# Patient Record
Sex: FEMALE | Race: BLACK OR AFRICAN AMERICAN | NOT HISPANIC OR LATINO | Employment: OTHER | URBAN - METROPOLITAN AREA
[De-identification: names, ages, dates, MRNs, and addresses within clinical notes are randomized per-mention and may not be internally consistent; named-entity substitution may affect disease eponyms.]

---

## 2017-01-04 ENCOUNTER — HOSPITAL ENCOUNTER (OUTPATIENT)
Dept: SLEEP MEDICINE | Facility: HOSPITAL | Age: 82
Discharge: HOME | End: 2017-01-04
Admitting: INTERNAL MEDICINE

## 2017-01-04 PROCEDURE — G0463 HOSPITAL OUTPT CLINIC VISIT: HCPCS

## 2017-01-04 PROCEDURE — 99213 OFFICE O/P EST LOW 20 MIN: CPT | Performed by: INTERNAL MEDICINE

## 2017-01-07 PROBLEM — G47.33 OSA TREATED WITH BIPAP: Status: ACTIVE | Noted: 2017-01-07

## 2017-01-07 NOTE — PROGRESS NOTES
[facility] Note January 4, 2017      Patient Care Team:  Мария Wilks MD as PCP - General  Мария Wilks MD as PCP - Family Medicine    Chief Complaint:  PRABHU     Interval History:   The patient was last seen by me in August 2016.  She was having increasing complaints of dyspnea at that time.  She is having more complaints with her arthritis.  She is stiffening and having difficulty ambulating.  She has back and leg pain.  She also has macular degeneration.    She appears to be stable, unchanged from her PRABHU standpoint.  She goes to bed between midnight and 2 AM and awakens 5 or 6 hours later.  She does have some complaints of hypersomnolence and her [place] Sleepiness Scale is borderline abnormal at 8.    Review of Systems:  Recorded on the Sleep Questionnaire.  Unremarkable except for nasal congestion, shortness of breath with exertion, occasional wheezing, atypical chest pain, cough, and anxiety and depression.    Social History:  She does not smoke cigarettes.  No alcohol.    Allergies:  Reviewed.     Medication Review:  Reviewed.      Vital Signs:  Height 60 inches and weight is up 20 pounds and she presently weighs 220 and she is obese with a body mass index of 43.    Physical Exam:    Constitutional:  Well developed black female and appears in no apparent distress.  Awake & oriented times 3.  Normal mood with normal recent and remote memory and normal judgement.  Eyes:  Conjunctivae normal.  Oropharynx:  moist mucous membranes without exudate and a large tongue and normal uvula and class II-III MP airway.      Results Review:  DME is [facility] and she uses a nasal mask.  Downloads between December 5, 2016 and January 3, 2017 reveals compliance at 100% and average usage is 4 hours and 59 minutes and usage greater than equal to 4 hours at 77% and average AHI is normal without significant leak.  Average auto BiPAP pressures: IPAP of 14 and EPAP of 10.1.  Auto BiPAP settings:  Max IPAP 18 minimum EPAP 10 max PS 6 minimum PS-2.       Impression:   Obstructive sleep apnea adequately treated with auto titrating BiPAP with good compliance and usage of borderline complaints of hypersomnolence.      Plan:  Good sleep hygiene measures should be maintained and weight loss would be beneficial.  The patient will continue her auto BiPAP nightly and she will use it greater than 4 hours nightly.  She feels better when she does use it this way.  I will see the patient back in August.      Carlos Eduardo Park MD  01/07/17  12:41 PM

## 2017-03-22 ENCOUNTER — HOSPITAL ENCOUNTER (OUTPATIENT)
Dept: SLEEP MEDICINE | Facility: HOSPITAL | Age: 82
Discharge: HOME | End: 2017-03-22
Admitting: INTERNAL MEDICINE

## 2017-03-22 PROCEDURE — 99213 OFFICE O/P EST LOW 20 MIN: CPT | Performed by: INTERNAL MEDICINE

## 2017-03-22 PROCEDURE — G0463 HOSPITAL OUTPT CLINIC VISIT: HCPCS

## 2017-03-26 NOTE — PROGRESS NOTES
[facility] Note       Patient Care Team:  Маиря Wilks MD as PCP - Internal Medicine    Chief Complaint:  PRABHU     Interval History:   The patient was last seen by me in January of this year.  Encouragement to use her auto BiPAP more was given.  She goes to bed around midnight and sleeps for 5 or 6 hours.  She does have complaints of hypersomnolence with an abnormal [place] Sleepiness Scale of 12.    Review of Systems:  Recorded on the Sleep Questionnaire.  Unremarkable except for wheezing, especially with exertion and anxiety and depression.    Social History:  She does not smoke cigarettes.  No alcohol.  She drinks some caffeinated beverages.    Allergies:  Reviewed.     Medication Review:  Reviewed.      Vital Signs:  Height 60 inches and weight 214 and she is obese with a body mass index of 42.    Physical Exam:    Constitutional:  Well developed black female and appears in no apparent distress.  She uses a rolling walker.  Awake & oriented times 3.  Normal mood with normal recent and remote memory and normal judgement.  Eyes:  Conjunctivae normal.  Oropharynx:  moist mucous membranes without exudate and a large tongue and normal uvula and class II-III MP airway.      Results Review:  DME is [facility].  She uses nasal pillows.  Downloads between 1/4/2017 and 3/20/2017 compliances greater than 95%.  Average usage is 4 hours and 30 minutes.  Average AHI is normal with a borderline leak.  Average auto BiPAP pressures: IPAP of 13.8 and EPAP of 10.1.  Auto BiPAP settings max IPAP 18 and minimum EPAP 10 and max pressure support 6 and minimal pressure support 2.       Impression:   Obstructive sleep apnea adequately treated with auto titrating BiPAP with improved compliance and usage suggested.  She has persistent complaints of hypersomnolence.      Plan:  Good sleep hygiene measures should be maintained.  Weight loss would be beneficial in this patient who is obese by BMI.    The  patient will continue auto BiPAP as she is doing.  She should use it every night as she is doing and she should try using it greater than 4 hours every day.    The patient will follow up with me in August.      Carlos Eduardo Park MD  03/26/17  12:00 PM

## 2017-04-21 ENCOUNTER — TRANSCRIBE ORDERS (OUTPATIENT)
Dept: PHYSICAL THERAPY | Facility: HOSPITAL | Age: 82
End: 2017-04-21

## 2017-04-21 DIAGNOSIS — R60.0 PEDAL EDEMA: Primary | ICD-10-CM

## 2017-05-11 ENCOUNTER — APPOINTMENT (OUTPATIENT)
Dept: PHYSICAL THERAPY | Facility: HOSPITAL | Age: 82
End: 2017-05-11

## 2017-12-15 ENCOUNTER — OFFICE VISIT (OUTPATIENT)
Dept: WOUND CARE | Facility: HOSPITAL | Age: 82
End: 2017-12-15
Attending: SURGERY

## 2017-12-15 DIAGNOSIS — IMO0001 INFECTION INVOLVING STITCH WITH ABSCESS, INITIAL ENCOUNTER: Primary | ICD-10-CM

## 2017-12-15 PROCEDURE — G0463 HOSPITAL OUTPT CLINIC VISIT: HCPCS

## 2017-12-15 PROCEDURE — 87070 CULTURE OTHR SPECIMN AEROBIC: CPT | Performed by: SURGERY

## 2017-12-15 PROCEDURE — 87075 CULTR BACTERIA EXCEPT BLOOD: CPT | Performed by: SURGERY

## 2017-12-15 PROCEDURE — 87186 SC STD MICRODIL/AGAR DIL: CPT | Performed by: SURGERY

## 2017-12-15 PROCEDURE — 87205 SMEAR GRAM STAIN: CPT | Performed by: SURGERY

## 2017-12-20 LAB — BACTERIA SPEC ANAEROBE CULT: NORMAL

## 2017-12-21 LAB
BACTERIA SPEC AEROBE CULT: ABNORMAL
GRAM STN SPEC: ABNORMAL

## 2017-12-22 ENCOUNTER — APPOINTMENT (OUTPATIENT)
Dept: WOUND CARE | Facility: HOSPITAL | Age: 82
End: 2017-12-22
Attending: SURGERY

## 2017-12-22 PROCEDURE — 97602 WOUND(S) CARE NON-SELECTIVE: CPT

## 2017-12-29 ENCOUNTER — APPOINTMENT (OUTPATIENT)
Dept: WOUND CARE | Facility: HOSPITAL | Age: 82
End: 2017-12-29
Attending: SURGERY

## 2018-01-05 ENCOUNTER — APPOINTMENT (OUTPATIENT)
Dept: WOUND CARE | Facility: HOSPITAL | Age: 83
End: 2018-01-05
Attending: SURGERY

## 2018-01-12 ENCOUNTER — APPOINTMENT (OUTPATIENT)
Dept: WOUND CARE | Facility: HOSPITAL | Age: 83
End: 2018-01-12
Attending: SURGERY

## 2018-07-06 ENCOUNTER — APPOINTMENT (OUTPATIENT)
Dept: CT IMAGING | Facility: HOSPITAL | Age: 83
End: 2018-07-06

## 2018-07-06 ENCOUNTER — HOSPITAL ENCOUNTER (EMERGENCY)
Facility: HOSPITAL | Age: 83
Discharge: HOME | End: 2018-07-06
Attending: EMERGENCY MEDICINE | Admitting: EMERGENCY MEDICINE

## 2018-07-06 VITALS
TEMPERATURE: 96.8 F | SYSTOLIC BLOOD PRESSURE: 139 MMHG | HEIGHT: 62 IN | WEIGHT: 170 LBS | DIASTOLIC BLOOD PRESSURE: 60 MMHG | BODY MASS INDEX: 31.28 KG/M2 | HEART RATE: 61 BPM | RESPIRATION RATE: 16 BRPM | OXYGEN SATURATION: 96 %

## 2018-07-06 DIAGNOSIS — H81.11 VERTIGO, BENIGN PAROXYSMAL, RIGHT: Primary | ICD-10-CM

## 2018-07-06 LAB
ALBUMIN SERPL-MCNC: 4 G/DL (ref 3.5–5.2)
ALBUMIN/GLOB SERPL: 1.1 G/DL
ALP SERPL-CCNC: 55 U/L (ref 39–117)
ALT SERPL W P-5'-P-CCNC: 21 U/L (ref 1–33)
ANION GAP SERPL CALCULATED.3IONS-SCNC: 12 MMOL/L
AST SERPL-CCNC: 19 U/L (ref 1–32)
BASOPHILS # BLD AUTO: 0.01 10*3/MM3 (ref 0–0.2)
BASOPHILS NFR BLD AUTO: 0.1 % (ref 0–1.5)
BILIRUB SERPL-MCNC: 0.3 MG/DL (ref 0.1–1.2)
BILIRUB UR QL STRIP: NEGATIVE
BUN BLD-MCNC: 36 MG/DL (ref 8–23)
BUN/CREAT SERPL: 28.8 (ref 7–25)
CALCIUM SPEC-SCNC: 10.1 MG/DL (ref 8.6–10.5)
CHLORIDE SERPL-SCNC: 98 MMOL/L (ref 98–107)
CLARITY UR: CLEAR
CO2 SERPL-SCNC: 30 MMOL/L (ref 22–29)
COLOR UR: YELLOW
CREAT BLD-MCNC: 1.25 MG/DL (ref 0.57–1)
DEPRECATED RDW RBC AUTO: 46.6 FL (ref 37–54)
EOSINOPHIL # BLD AUTO: 0.06 10*3/MM3 (ref 0–0.7)
EOSINOPHIL NFR BLD AUTO: 0.7 % (ref 0.3–6.2)
ERYTHROCYTE [DISTWIDTH] IN BLOOD BY AUTOMATED COUNT: 13.7 % (ref 11.7–13)
GFR SERPL CREATININE-BSD FRML MDRD: 49 ML/MIN/1.73
GLOBULIN UR ELPH-MCNC: 3.7 GM/DL
GLUCOSE BLD-MCNC: 193 MG/DL (ref 65–99)
GLUCOSE UR STRIP-MCNC: NEGATIVE MG/DL
HCT VFR BLD AUTO: 38.6 % (ref 35.6–45.5)
HGB BLD-MCNC: 12.8 G/DL (ref 11.9–15.5)
HGB UR QL STRIP.AUTO: NEGATIVE
IMM GRANULOCYTES # BLD: 0.02 10*3/MM3 (ref 0–0.03)
IMM GRANULOCYTES NFR BLD: 0.2 % (ref 0–0.5)
INR PPP: 1.07 (ref 0.9–1.1)
KETONES UR QL STRIP: NEGATIVE
LEUKOCYTE ESTERASE UR QL STRIP.AUTO: NEGATIVE
LYMPHOCYTES # BLD AUTO: 0.62 10*3/MM3 (ref 0.9–4.8)
LYMPHOCYTES NFR BLD AUTO: 6.8 % (ref 19.6–45.3)
MCH RBC QN AUTO: 30.8 PG (ref 26.9–32)
MCHC RBC AUTO-ENTMCNC: 33.2 G/DL (ref 32.4–36.3)
MCV RBC AUTO: 92.8 FL (ref 80.5–98.2)
MONOCYTES # BLD AUTO: 0.46 10*3/MM3 (ref 0.2–1.2)
MONOCYTES NFR BLD AUTO: 5 % (ref 5–12)
NEUTROPHILS # BLD AUTO: 7.96 10*3/MM3 (ref 1.9–8.1)
NEUTROPHILS NFR BLD AUTO: 87.4 % (ref 42.7–76)
NITRITE UR QL STRIP: NEGATIVE
PH UR STRIP.AUTO: 7 [PH] (ref 5–8)
PLATELET # BLD AUTO: 163 10*3/MM3 (ref 140–500)
PMV BLD AUTO: 10.5 FL (ref 6–12)
POTASSIUM BLD-SCNC: 4.7 MMOL/L (ref 3.5–5.2)
PROT SERPL-MCNC: 7.7 G/DL (ref 6–8.5)
PROT UR QL STRIP: ABNORMAL
PROTHROMBIN TIME: 13.7 SECONDS (ref 11.7–14.2)
RBC # BLD AUTO: 4.16 10*6/MM3 (ref 3.9–5.2)
SODIUM BLD-SCNC: 140 MMOL/L (ref 136–145)
SP GR UR STRIP: 1.01 (ref 1–1.03)
TROPONIN T SERPL-MCNC: <0.01 NG/ML (ref 0–0.03)
UROBILINOGEN UR QL STRIP: ABNORMAL
WBC NRBC COR # BLD: 9.11 10*3/MM3 (ref 4.5–10.7)

## 2018-07-06 PROCEDURE — 93005 ELECTROCARDIOGRAM TRACING: CPT | Performed by: EMERGENCY MEDICINE

## 2018-07-06 PROCEDURE — 84484 ASSAY OF TROPONIN QUANT: CPT | Performed by: EMERGENCY MEDICINE

## 2018-07-06 PROCEDURE — 80053 COMPREHEN METABOLIC PANEL: CPT | Performed by: EMERGENCY MEDICINE

## 2018-07-06 PROCEDURE — 70450 CT HEAD/BRAIN W/O DYE: CPT

## 2018-07-06 PROCEDURE — 99284 EMERGENCY DEPT VISIT MOD MDM: CPT

## 2018-07-06 PROCEDURE — 25010000002 ONDANSETRON PER 1 MG: Performed by: EMERGENCY MEDICINE

## 2018-07-06 PROCEDURE — 85025 COMPLETE CBC W/AUTO DIFF WBC: CPT | Performed by: EMERGENCY MEDICINE

## 2018-07-06 PROCEDURE — 36415 COLL VENOUS BLD VENIPUNCTURE: CPT

## 2018-07-06 PROCEDURE — 81003 URINALYSIS AUTO W/O SCOPE: CPT | Performed by: EMERGENCY MEDICINE

## 2018-07-06 PROCEDURE — 96374 THER/PROPH/DIAG INJ IV PUSH: CPT

## 2018-07-06 PROCEDURE — 96361 HYDRATE IV INFUSION ADD-ON: CPT

## 2018-07-06 PROCEDURE — 85610 PROTHROMBIN TIME: CPT | Performed by: EMERGENCY MEDICINE

## 2018-07-06 PROCEDURE — P9612 CATHETERIZE FOR URINE SPEC: HCPCS

## 2018-07-06 PROCEDURE — 93010 ELECTROCARDIOGRAM REPORT: CPT | Performed by: INTERNAL MEDICINE

## 2018-07-06 RX ORDER — ONDANSETRON 4 MG/1
4 TABLET, ORALLY DISINTEGRATING ORAL EVERY 6 HOURS PRN
Qty: 10 TABLET | Refills: 0 | Status: SHIPPED | OUTPATIENT
Start: 2018-07-06 | End: 2021-06-21

## 2018-07-06 RX ORDER — MECLIZINE HYDROCHLORIDE 25 MG/1
25 TABLET ORAL ONCE
Status: COMPLETED | OUTPATIENT
Start: 2018-07-06 | End: 2018-07-06

## 2018-07-06 RX ORDER — MECLIZINE HYDROCHLORIDE 25 MG/1
25 TABLET ORAL 3 TIMES DAILY PRN
Qty: 21 TABLET | Refills: 0 | Status: SHIPPED | OUTPATIENT
Start: 2018-07-06 | End: 2021-06-30

## 2018-07-06 RX ORDER — SODIUM CHLORIDE 0.9 % (FLUSH) 0.9 %
10 SYRINGE (ML) INJECTION AS NEEDED
Status: DISCONTINUED | OUTPATIENT
Start: 2018-07-06 | End: 2018-07-06 | Stop reason: HOSPADM

## 2018-07-06 RX ORDER — ONDANSETRON 2 MG/ML
4 INJECTION INTRAMUSCULAR; INTRAVENOUS ONCE
Status: COMPLETED | OUTPATIENT
Start: 2018-07-06 | End: 2018-07-06

## 2018-07-06 RX ADMIN — MECLIZINE 25 MG: 25 TABLET ORAL at 10:55

## 2018-07-06 RX ADMIN — SODIUM CHLORIDE 1000 ML: 9 INJECTION, SOLUTION INTRAVENOUS at 10:51

## 2018-07-06 RX ADMIN — ONDANSETRON 4 MG: 2 INJECTION INTRAMUSCULAR; INTRAVENOUS at 10:56

## 2018-07-06 NOTE — DISCHARGE INSTRUCTIONS
Drink plenty of fluids and use medications as needed for vertigo.  Do follow up with your family doctor.  Please return to the ED if symptoms worsen with fever, vomiting or increasing dizziness.

## 2018-07-06 NOTE — ED PROVIDER NOTES
" EMERGENCY DEPARTMENT ENCOUNTER    CHIEF COMPLAINT  Chief Complaint: dizziness  History given by: pt and pt's family  History limited by: confusion  Room Number: 29/29  PMD: No primary care provider on file.      HPI:  Pt is a 88 y.o. female who presents to the ED [with her family at bedside] complaining of dizziness [\"I feel like I'm drunk\"] that began around 0600 this morning. Pt reports that the dizziness worsens with laying flat and standing up. Pt also complains of a headache of the crown, diaphoresis, and confusion. Pt states that her blood sugar was 69 this morning which increased to 124 [per pt's family] after drinking juice. There are no other complaints at this time.    Duration: began this morning around 0600  Onset: gradual  Timing: constant  Quality: dizziness  Intensity/Severity: moderate  Associated Symptoms: headache of the crown, confusion, and diaphoresis  Aggravating Factors: laying flat and standing up    PAST MEDICAL HISTORY  Active Ambulatory Problems     Diagnosis Date Noted   • PRABHU treated with autoBiPAP 01/07/2017     Resolved Ambulatory Problems     Diagnosis Date Noted   • No Resolved Ambulatory Problems     Past Medical History:   Diagnosis Date   • Arthritis    • Diabetes 1.5, managed as type 2 (CMS/[facility], located within [facility])    • Edema    • Hypertension    • Hypothyroidism    • Low back pain    • Macular degeneration        PAST SURGICAL HISTORY  Past Surgical History:   Procedure Laterality Date   • CARDIAC SURGERY     • CHOLECYSTECTOMY     • HIATAL HERNIA REPAIR     • HYSTERECTOMY         FAMILY HISTORY  History reviewed. No pertinent family history.    SOCIAL HISTORY  Social History     Social History   • Marital status:      Spouse name: N/A   • Number of children: N/A   • Years of education: N/A     Occupational History   • Not on file.     Social History Main Topics   • Smoking status: Never Smoker   • Smokeless tobacco: Never Used   • Alcohol use No   • Drug use: No   • Sexual activity: Defer "     Other Topics Concern   • Not on file     Social History Narrative   • No narrative on file       ALLERGIES  Vasotec [enalapril]; Albuterol; Atacand hct [candesartan cilexetil-hctz]; Beta adrenergic blockers; Calan [verapamil]; Glucophage [metformin hcl]; Hytrin [terazosin]; Metolazone; Norvasc [amlodipine besylate]; and Thiazide-type diuretics    REVIEW OF SYSTEMS  Review of Systems   Constitutional: Positive for diaphoresis. Negative for fever.   HENT: Negative for sore throat.    Eyes: Negative.    Respiratory: Negative for cough and shortness of breath.    Cardiovascular: Negative for chest pain.   Gastrointestinal: Negative for abdominal pain, diarrhea and vomiting.   Genitourinary: Negative for dysuria.   Musculoskeletal: Negative for neck pain.   Skin: Negative for rash.   Allergic/Immunologic: Negative.    Neurological: Positive for dizziness and headaches (of the crown). Negative for weakness and numbness.   Hematological: Negative.    Psychiatric/Behavioral: Positive for confusion.   All other systems reviewed and are negative.      PHYSICAL EXAM  ED Triage Vitals   Temp Heart Rate Resp BP SpO2   07/06/18 0921 07/06/18 0918 07/06/18 0918 07/06/18 0918 07/06/18 0918   96.8 °F (36 °C) 72 16 144/70 99 %      Temp src Heart Rate Source Patient Position BP Location FiO2 (%)   07/06/18 0921 -- -- -- --   Tympanic           Physical Exam   Constitutional: She is oriented to person, place, and time. She appears distressed (mild).   HENT:   Head: Normocephalic and atraumatic.   Mouth/Throat: Mucous membranes are dry.   Eyes: EOM are normal. Pupils are equal, round, and reactive to light.   Nystagmus with a fast component going to the right.   Neck: Normal range of motion. Neck supple.   Cardiovascular: Normal rate, regular rhythm and normal heart sounds.  Exam reveals no gallop and no friction rub.    No murmur heard.  Pulmonary/Chest: Effort normal and breath sounds normal. No respiratory distress. She has no  decreased breath sounds. She has no wheezes. She has no rhonchi. She has no rales. She exhibits no tenderness.   Abdominal: Soft. Bowel sounds are normal. She exhibits no distension and no mass. There is no tenderness. There is no rebound and no guarding.   Musculoskeletal: Normal range of motion. She exhibits edema (1+ bilaterally).   Neurological: She is alert and oriented to person, place, and time. She has normal sensation, normal strength and intact cranial nerves. No cranial nerve deficit.   No facial droop.   Skin: Skin is warm and dry. No rash noted.   Psychiatric: Mood and affect normal.   Nursing note and vitals reviewed.      LAB RESULTS  Lab Results (last 24 hours)     Procedure Component Value Units Date/Time    CBC & Differential [[id number]] Collected:  07/06/18 1014    Specimen:  Blood Updated:  07/06/18 1029    Narrative:       The following orders were created for panel order CBC & Differential.  Procedure                               Abnormality         Status                     ---------                               -----------         ------                     CBC Auto Differential[[id number]]        Abnormal            Final result                 Please view results for these tests on the individual orders.    Comprehensive Metabolic Panel [[id number]]  (Abnormal) Collected:  07/06/18 1014    Specimen:  Blood Updated:  07/06/18 1044     Glucose 193 (H) mg/dL      BUN 36 (H) mg/dL      Creatinine 1.25 (H) mg/dL      Sodium 140 mmol/L      Potassium 4.7 mmol/L      Chloride 98 mmol/L      CO2 30.0 (H) mmol/L      Calcium 10.1 mg/dL      Total Protein 7.7 g/dL      Albumin 4.00 g/dL      ALT (SGPT) 21 U/L      AST (SGOT) 19 U/L      Alkaline Phosphatase 55 U/L      Total Bilirubin 0.3 mg/dL      eGFR   Amer 49 (L) mL/min/1.73      Globulin 3.7 gm/dL      A/G Ratio 1.1 g/dL      BUN/Creatinine Ratio 28.8 (H)     Anion Gap 12.0 mmol/L     Narrative:       The MDRD GFR formula is only valid  for adults with stable renal function between ages 18 and 70.    Troponin [[id number]]  (Normal) Collected:  07/06/18 1014    Specimen:  Blood Updated:  07/06/18 1047     Troponin T <0.010 ng/mL     Narrative:       Troponin T Reference Ranges:  Less than 0.03 ng/mL:    Negative for AMI  0.03 to 0.09 ng/mL:      Indeterminant for AMI  Greater than 0.09 ng/mL: Positive for AMI    Protime-INR [[id number]]  (Normal) Collected:  07/06/18 1014    Specimen:  Blood Updated:  07/06/18 1041     Protime 13.7 Seconds      INR 1.07    CBC Auto Differential [[id number]]  (Abnormal) Collected:  07/06/18 1014    Specimen:  Blood Updated:  07/06/18 1029     WBC 9.11 10*3/mm3      RBC 4.16 10*6/mm3      Hemoglobin 12.8 g/dL      Hematocrit 38.6 %      MCV 92.8 fL      MCH 30.8 pg      MCHC 33.2 g/dL      RDW 13.7 (H) %      RDW-SD 46.6 fl      MPV 10.5 fL      Platelets 163 10*3/mm3      Neutrophil % 87.4 (H) %      Lymphocyte % 6.8 (L) %      Monocyte % 5.0 %      Eosinophil % 0.7 %      Basophil % 0.1 %      Immature Grans % 0.2 %      Neutrophils, Absolute 7.96 10*3/mm3      Lymphocytes, Absolute 0.62 (L) 10*3/mm3      Monocytes, Absolute 0.46 10*3/mm3      Eosinophils, Absolute 0.06 10*3/mm3      Basophils, Absolute 0.01 10*3/mm3      Immature Grans, Absolute 0.02 10*3/mm3     Urinalysis With Microscopic If Indicated (No Culture) - Urine, Catheter [[id number]]  (Abnormal) Collected:  07/06/18 1052    Specimen:  Urine from Urine, Catheter Updated:  07/06/18 1133     Color, UA Yellow     Appearance, UA Clear     pH, UA 7.0     Specific Gravity, UA 1.011     Glucose, UA Negative     Ketones, UA Negative     Bilirubin, UA Negative     Blood, UA Negative     Protein, UA Trace (A)     Leuk Esterase, UA Negative     Nitrite, UA Negative     Urobilinogen, UA 0.2 E.U./dL    Narrative:       Urine microscopic not indicated.          I ordered the above labs and reviewed the results    RADIOLOGY  CT Head Without Contrast   Preliminary Result    Small vessel ischemic disease with no evidence of acute   infarction or hemorrhage. Vascular calcification. Further evaluation   could be performed with a MRI examination of brain as indicated.       The above information was called to and discussed with Dr. Christiansen.               Radiation dose reduction techniques were utilized, including automated   exposure control and exposure modulation based on body size.                   I ordered the above noted radiological studies. Interpreted by radiologist. Reviewed by me in PACS.       PROCEDURES  Procedures      EKG           EKG time: 10:21 AM  Rhythm/Rate: NSR, 70  P waves and [place]: nml; nml  QRS, axis: RBBB   ST and T waves: nml     Interpreted Contemporaneously by me, independently viewed and is unchanged compared to prior 01/19/13      PROGRESS AND CONSULTS  1007 Ordered CT Head, EKG, UA, and blood work for further evaluation. Also ordered Antivert to treat vertigo and Zofran for nausea. Also ordered IVF for hydration.    1144 Rechecked pt who states that her symptoms have improved. Discussed the lab results that show dehydration. Also discussed the unremarkable CT Head. Advised the pt about the plan for discharge. Pt understands and agrees with the plan, all questions answered.        MEDICAL DECISION MAKING  Results were reviewed/discussed with the patient and they were also made aware of online access. Pt also made aware that some labs, such as cultures, will not be resulted during ER visit and follow up with PMD is necessary.     MDM  Number of Diagnoses or Management Options     Amount and/or Complexity of Data Reviewed  Clinical lab tests: ordered and reviewed (BUN - 36 and Creatinine - 1.25)  Tests in the radiology section of CPT®: ordered and reviewed (CT Head shows a small vessel ischemic disease with no evidence of acute infarction or hemorrhage. Vascular calcification.)  Tests in the medicine section of CPT®: ordered and reviewed (See procedure notes  for EKG interpretation.)  Decide to obtain previous medical records or to obtain history from someone other than the patient: yes  Obtain history from someone other than the patient: yes (Pt's family)  Review and summarize past medical records: yes (The patient has no recent pertinent ED visits.)  Independent visualization of images, tracings, or specimens: yes    Patient Progress  Patient progress: stable         DIAGNOSIS  Final diagnoses:   Vertigo, benign paroxysmal, right       DISPOSITION  DISCHARGE    Patient discharged in stable condition.    Reviewed implications of results, diagnosis, meds, responsibility to follow up, warning signs and symptoms of possible worsening, potential complications and reasons to return to ER, including any new or worsening symptoms.    Patient/Family voiced understanding of above instructions.    Discussed plan for discharge, as there is no emergent indication for admission. Patient referred to primary care provider for BP management due to today's BP. Pt/family is agreeable and understands need for follow up and repeat testing.  Pt is aware that discharge does not mean that nothing is wrong but it indicates no emergency is present that requires admission and they must continue care with follow-up as given below or physician of their choice.     FOLLOW-UP  PATIENT LIAISON [place] [id number]  [phone]  Schedule an appointment as soon as possible for a visit            Medication List      New Prescriptions    meclizine 25 MG tablet  Commonly known as:  ANTIVERT  Take 1 tablet by mouth 3 (Three) Times a Day As Needed for dizziness.     ondansetron ODT 4 MG disintegrating tablet  Commonly known as:  ZOFRAN-ODT  Take 1 tablet by mouth Every 6 (Six) Hours As Needed for Nausea.              Latest Documented Vital Signs:  As of 11:53 AM  BP- 162/71 HR- 73 Temp- 96.8 °F (36 °C) (Tympanic) O2 sat- 95%    --  Documentation assistance provided by vadim Dawson  Nydia for Dr. Christiansen.  Information recorded by the scribe was done at my direction and has been verified and validated by me.       Eunice Stafford  07/06/18 1153       Tone Christiansen MD  07/06/18 [id number]

## 2018-07-06 NOTE — ED NOTES
Wheeled patient to the restroom, assisted patient in bathroom.      Shelli Bills RN  07/06/18 4837

## 2018-09-12 ENCOUNTER — APPOINTMENT (OUTPATIENT)
Dept: SLEEP MEDICINE | Facility: HOSPITAL | Age: 83
End: 2018-09-12
Attending: INTERNAL MEDICINE

## 2019-01-02 ENCOUNTER — OFFICE VISIT (OUTPATIENT)
Dept: SLEEP MEDICINE | Facility: HOSPITAL | Age: 84
End: 2019-01-02
Attending: INTERNAL MEDICINE

## 2019-01-02 VITALS
OXYGEN SATURATION: 95 % | HEART RATE: 87 BPM | WEIGHT: 192.4 LBS | BODY MASS INDEX: 35.41 KG/M2 | HEIGHT: 62 IN | SYSTOLIC BLOOD PRESSURE: 171 MMHG | DIASTOLIC BLOOD PRESSURE: 66 MMHG

## 2019-01-02 DIAGNOSIS — G47.14 HYPERSOMNIA DUE TO MEDICAL CONDITION: ICD-10-CM

## 2019-01-02 DIAGNOSIS — G47.33 OSA TREATED WITH BIPAP: Primary | ICD-10-CM

## 2019-01-02 PROCEDURE — 99213 OFFICE O/P EST LOW 20 MIN: CPT | Performed by: INTERNAL MEDICINE

## 2019-01-02 PROCEDURE — G0463 HOSPITAL OUTPT CLINIC VISIT: HCPCS

## 2019-01-02 NOTE — PROGRESS NOTES
"Follow Up [facility] Note     Chief Complaint:  PRABHU     Primary Care Physician: Мария Wilks MD    Interval History:   I last saw the patient in March 2017.  She states she is stable.  She goes to bed around 10 or 11 PM and awakens between 8 and 9 AM.  She awakens once for the restroom.  [place] Sleepiness Scale is abnormal at 12.    He states she is bothered by macular degeneration.    Review of Systems:  Recorded on the Sleep Questionnaire.  Unremarkable except for nasal congestion and postnasal drip, wheezing and atypical chest pain, some chills, anxiety and depression    Social History:    Social History     Socioeconomic History   • Marital status:      Spouse name: Not on file   • Number of children: Not on file   • Years of education: Not on file   • Highest education level: Not on file   Tobacco Use   • Smoking status: Never Smoker   • Smokeless tobacco: Never Used   Substance and Sexual Activity   • Alcohol use: No   • Drug use: No   • Sexual activity: Defer       Allergies:  Vasotec [enalapril]; Albuterol; Atacand hct [candesartan cilexetil-hctz]; Beta adrenergic blockers; Calan [verapamil]; Glucophage [metformin hcl]; Hytrin [terazosin]; Metolazone; Norvasc [amlodipine besylate]; and Thiazide-type diuretics     Medication Review:  Reviewed.      Vital Signs:    Vitals:    01/02/19 1100   BP: 171/66   Pulse: 87   SpO2: 95%   Weight: 87.3 kg (192 lb 6.4 oz)   Height: 157.5 cm (62\")     Body mass index is 35.19 kg/m².    Physical Exam:    Constitutional:  Well developed black female and appears in no apparent distress.  Awake & oriented times 3.  Normal mood with normal recent and remote memory and normal judgement.  Eyes:  Conjunctivae normal.  Oropharynx:  moist mucous membranes without exudate and a large tongue and normal uvula and class II-III MP airway      Results Review:  DME is [facility] and she uses a nasal interface.  Downloads between October 22 and January 1, " 2019 compliance greater than 85%.  Average usage is 6 hours and 16 minutes.  Average AHI is normal with a leak of 2 hours and 34 minutes.  Average AutoBiPAP pressure is IPAP of 13 and EPAP of 10.9.  Auto BiPAP settings: Max IPAP 18 minimum EPAP 10 and max pressure support 6 minimum pressure 6 to.       Impression:   Obstructive sleep apnea adequately treated with auto BiPAP with good compliance and usage and some complaints of hypersomnolence.  She does have a large leak    Plan:  Good sleep hygiene measures should be maintained.  Weight loss would be beneficial in this patient who is obese by BMI.  The patient has succeeded and some weight loss.  The patient is benefiting from the treatment being provided.     The patient will continue auto BiPAP.  A new prescription will be sent to her DME.    The patient will call for any problems and will follow up in one year.      Carlos Eduardo Park MD  Sleep Medicine  01/13/19  2:40 PM

## 2019-01-13 PROBLEM — G47.14 HYPERSOMNIA DUE TO MEDICAL CONDITION: Status: ACTIVE | Noted: 2019-01-13

## 2019-04-11 ENCOUNTER — HOSPITAL ENCOUNTER (OUTPATIENT)
Dept: GENERAL RADIOLOGY | Facility: HOSPITAL | Age: 84
Discharge: HOME | End: 2019-04-11
Admitting: INTERNAL MEDICINE

## 2019-04-11 DIAGNOSIS — R06.02 SOB (SHORTNESS OF BREATH): ICD-10-CM

## 2019-04-11 PROCEDURE — 71046 X-RAY EXAM CHEST 2 VIEWS: CPT

## 2019-09-21 ENCOUNTER — HOSPITAL ENCOUNTER (EMERGENCY)
Facility: HOSPITAL | Age: 84
Discharge: HOME | End: 2019-09-21
Attending: EMERGENCY MEDICINE | Admitting: EMERGENCY MEDICINE

## 2019-09-21 VITALS
DIASTOLIC BLOOD PRESSURE: 72 MMHG | SYSTOLIC BLOOD PRESSURE: 185 MMHG | RESPIRATION RATE: 20 BRPM | HEART RATE: 72 BPM | OXYGEN SATURATION: 91 % | TEMPERATURE: 98.2 F

## 2019-09-21 DIAGNOSIS — R73.9 HYPERGLYCEMIA: Primary | ICD-10-CM

## 2019-09-21 DIAGNOSIS — R42 LIGHTHEADEDNESS: ICD-10-CM

## 2019-09-21 LAB
ANION GAP SERPL CALCULATED.3IONS-SCNC: 11 MMOL/L (ref 5–15)
BASOPHILS # BLD AUTO: 0.01 10*3/MM3 (ref 0–0.2)
BASOPHILS NFR BLD AUTO: 0.2 % (ref 0–1.5)
BILIRUB UR QL STRIP: NEGATIVE
BUN BLD-MCNC: 46 MG/DL (ref 8–23)
BUN/CREAT SERPL: 36.2 (ref 7–25)
CALCIUM SPEC-SCNC: 9.6 MG/DL (ref 8.2–9.6)
CHLORIDE SERPL-SCNC: 96 MMOL/L (ref 98–107)
CLARITY UR: CLEAR
CO2 SERPL-SCNC: 30 MMOL/L (ref 22–29)
COLOR UR: YELLOW
CREAT BLD-MCNC: 1.27 MG/DL (ref 0.57–1)
DEPRECATED RDW RBC AUTO: 46.5 FL (ref 37–54)
EOSINOPHIL # BLD AUTO: 0.08 10*3/MM3 (ref 0–0.4)
EOSINOPHIL NFR BLD AUTO: 1.3 % (ref 0.3–6.2)
ERYTHROCYTE [DISTWIDTH] IN BLOOD BY AUTOMATED COUNT: 13.5 % (ref 12.3–15.4)
GFR SERPL CREATININE-BSD FRML MDRD: 48 ML/MIN/1.73
GLUCOSE BLD-MCNC: 154 MG/DL (ref 65–99)
GLUCOSE UR STRIP-MCNC: NEGATIVE MG/DL
HCT VFR BLD AUTO: 37.8 % (ref 34–46.6)
HGB BLD-MCNC: 12.1 G/DL (ref 12–15.9)
HGB UR QL STRIP.AUTO: NEGATIVE
IMM GRANULOCYTES # BLD AUTO: 0.03 10*3/MM3 (ref 0–0.05)
IMM GRANULOCYTES NFR BLD AUTO: 0.5 % (ref 0–0.5)
KETONES UR QL STRIP: NEGATIVE
LEUKOCYTE ESTERASE UR QL STRIP.AUTO: NEGATIVE
LYMPHOCYTES # BLD AUTO: 0.56 10*3/MM3 (ref 0.7–3.1)
LYMPHOCYTES NFR BLD AUTO: 9.3 % (ref 19.6–45.3)
MCH RBC QN AUTO: 29.7 PG (ref 26.6–33)
MCHC RBC AUTO-ENTMCNC: 32 G/DL (ref 31.5–35.7)
MCV RBC AUTO: 92.9 FL (ref 79–97)
MONOCYTES # BLD AUTO: 0.65 10*3/MM3 (ref 0.1–0.9)
MONOCYTES NFR BLD AUTO: 10.8 % (ref 5–12)
NEUTROPHILS # BLD AUTO: 4.67 10*3/MM3 (ref 1.7–7)
NEUTROPHILS NFR BLD AUTO: 77.9 % (ref 42.7–76)
NITRITE UR QL STRIP: NEGATIVE
NRBC BLD AUTO-RTO: 0 /100 WBC (ref 0–0.2)
PH UR STRIP.AUTO: 6.5 [PH] (ref 5–8)
PLATELET # BLD AUTO: 162 10*3/MM3 (ref 140–450)
PMV BLD AUTO: 10.2 FL (ref 6–12)
POTASSIUM BLD-SCNC: 4.2 MMOL/L (ref 3.5–5.2)
PROT UR QL STRIP: NEGATIVE
RBC # BLD AUTO: 4.07 10*6/MM3 (ref 3.77–5.28)
SODIUM BLD-SCNC: 137 MMOL/L (ref 136–145)
SP GR UR STRIP: 1.01 (ref 1–1.03)
UROBILINOGEN UR QL STRIP: NORMAL
WBC NRBC COR # BLD: 6 10*3/MM3 (ref 3.4–10.8)

## 2019-09-21 PROCEDURE — 80048 BASIC METABOLIC PNL TOTAL CA: CPT | Performed by: EMERGENCY MEDICINE

## 2019-09-21 PROCEDURE — 99284 EMERGENCY DEPT VISIT MOD MDM: CPT

## 2019-09-21 PROCEDURE — 85025 COMPLETE CBC W/AUTO DIFF WBC: CPT | Performed by: EMERGENCY MEDICINE

## 2019-09-21 PROCEDURE — 81003 URINALYSIS AUTO W/O SCOPE: CPT | Performed by: EMERGENCY MEDICINE

## 2019-09-21 RX ADMIN — SODIUM CHLORIDE 500 ML: 9 INJECTION, SOLUTION INTRAVENOUS at 13:07

## 2019-09-21 NOTE — DISCHARGE INSTRUCTIONS
To new current medications, drink plenty of water, follow-up with your primary care provider for recheck next week, return to the emergency department for worsening symptoms as needed.

## 2019-09-21 NOTE — ED PROVIDER NOTES
EMERGENCY DEPARTMENT ENCOUNTER    Room Number:  37/37  Date of encounter:  9/21/2019  PCP: Мария Wilks MD  Historian: patient, daughter      HPI:  Chief Complaint: hyperglycemia  Context: Denny Ivy is a 89 y.o. female with Hx of DM, who presents to the ED c/o hyperglycemia () that was measured this morning. Pt reports that she likely forgot to take her lantis last night. Per daughter, the pt took 10 units of Humalog this morning and did not eat her full breakfast. Pt states that she mostly feels at baseline, but does reports intermittent dizziness and mild confusion throughout the day today. Denies fever, CP, and SOA. There are no other complaints.     PAST MEDICAL HISTORY  Active Ambulatory Problems     Diagnosis Date Noted   • PRABHU treated with autoBiPAP 01/07/2017   • Hypersomnia due to medical condition 01/13/2019     Resolved Ambulatory Problems     Diagnosis Date Noted   • No Resolved Ambulatory Problems     Past Medical History:   Diagnosis Date   • Arthritis    • Diabetes 1.5, managed as type 2 (CMS/[facility])    • Edema    • Hypertension    • Hypothyroidism    • Low back pain    • Macular degeneration          PAST SURGICAL HISTORY  Past Surgical History:   Procedure Laterality Date   • CARDIAC SURGERY     • CHOLECYSTECTOMY     • HIATAL HERNIA REPAIR     • HYSTERECTOMY           FAMILY HISTORY  No family history on file.      SOCIAL HISTORY  Social History     Socioeconomic History   • Marital status:      Spouse name: Not on file   • Number of children: Not on file   • Years of education: Not on file   • Highest education level: Not on file   Tobacco Use   • Smoking status: Never Smoker   • Smokeless tobacco: Never Used   Substance and Sexual Activity   • Alcohol use: No   • Drug use: No   • Sexual activity: Defer         ALLERGIES  Vasotec [enalapril]; Albuterol; Atacand hct [candesartan cilexetil-hctz]; Beta adrenergic blockers; Calan [verapamil]; Glucophage [metformin hcl]; Hytrin  [terazosin]; Metolazone; Norvasc [amlodipine besylate]; and Thiazide-type diuretics        REVIEW OF SYSTEMS  Review of Systems   Constitutional: Negative for fever.        (+) hyperglycemia   HENT: Negative for sore throat.    Eyes: Negative.    Respiratory: Negative for cough and shortness of breath.    Cardiovascular: Negative for chest pain.   Gastrointestinal: Negative for abdominal pain, diarrhea and vomiting.   Genitourinary: Negative for dysuria.   Musculoskeletal: Negative for neck pain.   Skin: Negative for rash.   Allergic/Immunologic: Negative.    Neurological: Positive for dizziness (intermittent). Negative for weakness, numbness and headaches.   Hematological: Negative.    Psychiatric/Behavioral: Positive for confusion.   All other systems reviewed and are negative.       PHYSICAL EXAM    I have reviewed the triage vital signs and nursing notes.    ED Triage Vitals   Temp Heart Rate Resp BP SpO2   -- 09/21/19 1229 09/21/19 1229 09/21/19 1229 09/21/19 1229    70 18 178/68 98 %      Temp src Heart Rate Source Patient Position BP Location FiO2 (%)   -- 09/21/19 1229 -- 09/21/19 1300 --    Monitor  Right arm          Physical Exam   Constitutional: She is oriented to person, place, and time and well-developed, well-nourished, and in no distress. No distress.   HENT:   Head: Normocephalic.   Mouth/Throat: Mucous membranes are normal.   Eyes: EOM are normal.   Neck: Normal range of motion.   Cardiovascular: Normal rate and regular rhythm. Exam reveals no gallop and no friction rub.   No murmur heard.  Pulmonary/Chest: Effort normal. No respiratory distress. She has no wheezes. She has no rhonchi. She has no rales.   Abdominal: Soft. She exhibits no distension. There is no tenderness. There is no guarding.   Musculoskeletal: Normal range of motion. She exhibits no deformity.   Neurological: She is alert and oriented to person, place, and time. GCS score is 15.   moving all extremities, no focal deficits    Skin: Skin is warm and dry.   Psychiatric:   Calm, cooperative   Nursing note and vitals reviewed.      LAB RESULTS  Recent Results (from the past 24 hour(s))   Basic Metabolic Panel    Collection Time: 09/21/19  1:07 PM   Result Value Ref Range    Glucose 154 (H) 65 - 99 mg/dL    BUN 46 (H) 8 - 23 mg/dL    Creatinine 1.27 (H) 0.57 - 1.00 mg/dL    Sodium 137 136 - 145 mmol/L    Potassium 4.2 3.5 - 5.2 mmol/L    Chloride 96 (L) 98 - 107 mmol/L    CO2 30.0 (H) 22.0 - 29.0 mmol/L    Calcium 9.6 8.2 - 9.6 mg/dL    eGFR  African Amer 48 (L) >60 mL/min/1.73    BUN/Creatinine Ratio 36.2 (H) 7.0 - 25.0    Anion Gap 11.0 5.0 - 15.0 mmol/L   CBC Auto Differential    Collection Time: 09/21/19  1:07 PM   Result Value Ref Range    WBC 6.00 3.40 - 10.80 10*3/mm3    RBC 4.07 3.77 - 5.28 10*6/mm3    Hemoglobin 12.1 12.0 - 15.9 g/dL    Hematocrit 37.8 34.0 - 46.6 %    MCV 92.9 79.0 - 97.0 fL    MCH 29.7 26.6 - 33.0 pg    MCHC 32.0 31.5 - 35.7 g/dL    RDW 13.5 12.3 - 15.4 %    RDW-SD 46.5 37.0 - 54.0 fl    MPV 10.2 6.0 - 12.0 fL    Platelets 162 140 - 450 10*3/mm3    Neutrophil % 77.9 (H) 42.7 - 76.0 %    Lymphocyte % 9.3 (L) 19.6 - 45.3 %    Monocyte % 10.8 5.0 - 12.0 %    Eosinophil % 1.3 0.3 - 6.2 %    Basophil % 0.2 0.0 - 1.5 %    Immature Grans % 0.5 0.0 - 0.5 %    Neutrophils, Absolute 4.67 1.70 - 7.00 10*3/mm3    Lymphocytes, Absolute 0.56 (L) 0.70 - 3.10 10*3/mm3    Monocytes, Absolute 0.65 0.10 - 0.90 10*3/mm3    Eosinophils, Absolute 0.08 0.00 - 0.40 10*3/mm3    Basophils, Absolute 0.01 0.00 - 0.20 10*3/mm3    Immature Grans, Absolute 0.03 0.00 - 0.05 10*3/mm3    nRBC 0.0 0.0 - 0.2 /100 WBC   Urinalysis With Microscopic If Indicated (No Culture) - Urine, Clean Catch    Collection Time: 09/21/19  1:54 PM   Result Value Ref Range    Color, UA Yellow Yellow, Straw    Appearance, UA Clear Clear    pH, UA 6.5 5.0 - 8.0    Specific Gravity, UA 1.008 1.005 - 1.030    Glucose, UA Negative Negative    Ketones, UA Negative Negative     Bilirubin, UA Negative Negative    Blood, UA Negative Negative    Protein, UA Negative Negative    Leuk Esterase, UA Negative Negative    Nitrite, UA Negative Negative    Urobilinogen, UA 0.2 E.U./dL 0.2 - 1.0 E.U./dL       Ordered the above labs and independently reviewed the results.        PROCEDURES    Procedures    MEDICATIONS GIVEN IN ER    Medications   sodium chloride 0.9 % bolus 500 mL (0 mL Intravenous Stopped 9/21/19 1607)         PROGRESS, DATA ANALYSIS, CONSULTS, AND MEDICAL DECISION MAKING    All labs have been independently reviewed by me.  All radiology studies have been reviewed by me and discussed with radiologist dictating the report.   EKG's independently viewed and interpreted by me.  Discussion below represents my analysis of pertinent findings related to patient's condition, differential diagnosis, treatment plan and final disposition.    ED Course as of Sep 21 2013   Sat Sep 21, 2019   1535 Evaluated in the emergency department in July 2018 for dizziness.  [DC]   1535 Patient presented today with complaints of not feeling well with some lightheadedness and elevated glucose.  She states that she forgot to take her Lantus last night and started feeling ill today.  I did not have any significant focal complaints, and laboratory work-up shows stable mild chronic kidney disease with mild hyperglycemia.  No evidence of DKA or other acute concerning process at this time.  No leukocytosis or fever.  Vital signs remain normal.  At this time patient is safe for discharge back home, has received IV fluids.  I have advised her to continue current medications as prescribed, increase her water intake, follow-up with her PCP early next week for reevaluation, and return to the emergency department for worsening symptoms as needed.  [DC]      ED Course User Index  [DC] Abel Wong MD       4037- Rechecked pt. Pt is resting comfortably. Notified pt of her lab results; which are unremarkable. Discussed  the plan to discharge the pt home. I instructed the pt to f/u with her PCP. RTED instructions given. Pt understands and agrees with the plan, all questions answered.    --  AS OF 8:13 PM VITALS:    BP - (!) 185/72  HR - 72  TEMP - 98.2 °F (36.8 °C) (Oral)  02 SATS - 91%  --    DIAGNOSIS  Final diagnoses:   Hyperglycemia   Lightheadedness         DISPOSITION  DISCHARGE    Patient discharged in stable condition.    Reviewed implications of results, diagnosis, meds, responsibility to follow up, warning signs and symptoms of possible worsening, potential complications and reasons to return to ER.    Patient/Family voiced understanding of above instructions.    Discussed plan for discharge, as there is no emergent indication for admission. Patient referred to primary care provider for BP management due to today's BP. Pt/family is agreeable and understands need for follow up and repeat testing.  Pt is aware that discharge does not mean that nothing is wrong but it indicates no emergency is present that requires admission and they must continue care with follow-up as given below or physician of their choice.     FOLLOW-UP  [facility]  [address] [facility] [address] [phone]    As needed, If symptoms worsen         Medication List      No changes were made to your prescriptions during this visit.       --  Documentation assistance provided by vadim Isidro for Dr. Judith MD.  Information recorded by the vadim was done at my direction and has been verified and validated by me.     Alexandr Isidro  09/21/19 1535       Abel Wong MD  09/21/19 2013

## 2019-09-21 NOTE — ED TRIAGE NOTES
Pt forgot to take her lantus last night checked her glucose this morning was 248 and intermittent dizziness.

## 2020-04-02 ENCOUNTER — APPOINTMENT (OUTPATIENT)
Dept: SLEEP MEDICINE | Facility: HOSPITAL | Age: 85
End: 2020-04-02
Attending: INTERNAL MEDICINE

## 2020-07-01 ENCOUNTER — APPOINTMENT (OUTPATIENT)
Dept: SLEEP MEDICINE | Facility: HOSPITAL | Age: 85
End: 2020-07-01
Attending: INTERNAL MEDICINE

## 2021-05-22 ENCOUNTER — APPOINTMENT (OUTPATIENT)
Dept: CARDIOLOGY | Facility: HOSPITAL | Age: 86
End: 2021-05-22

## 2021-05-22 ENCOUNTER — HOSPITAL ENCOUNTER (INPATIENT)
Facility: HOSPITAL | Age: 86
LOS: 6 days | Discharge: HOME HEALTH | End: 2021-05-28
Attending: EMERGENCY MEDICINE | Admitting: HOSPITALIST

## 2021-05-22 ENCOUNTER — APPOINTMENT (OUTPATIENT)
Dept: GENERAL RADIOLOGY | Facility: HOSPITAL | Age: 86
End: 2021-05-22

## 2021-05-22 DIAGNOSIS — R60.0 PEDAL EDEMA: ICD-10-CM

## 2021-05-22 DIAGNOSIS — R07.89 ATYPICAL CHEST PAIN: ICD-10-CM

## 2021-05-22 DIAGNOSIS — I50.9 ACUTE ON CHRONIC CONGESTIVE HEART FAILURE, UNSPECIFIED HEART FAILURE TYPE (HCC): Primary | ICD-10-CM

## 2021-05-22 LAB
ALBUMIN SERPL-MCNC: 3.9 G/DL (ref 3.5–5.2)
ALBUMIN/GLOB SERPL: 1.3 G/DL
ALP SERPL-CCNC: 54 U/L (ref 39–117)
ALT SERPL W P-5'-P-CCNC: 17 U/L (ref 1–33)
ANION GAP SERPL CALCULATED.3IONS-SCNC: 9 MMOL/L (ref 5–15)
AST SERPL-CCNC: 20 U/L (ref 1–32)
BASOPHILS # BLD AUTO: 0.02 10*3/MM3 (ref 0–0.2)
BASOPHILS NFR BLD AUTO: 0.3 % (ref 0–1.5)
BILIRUB SERPL-MCNC: 0.2 MG/DL (ref 0–1.2)
BUN SERPL-MCNC: 54 MG/DL (ref 8–23)
BUN/CREAT SERPL: 36 (ref 7–25)
CALCIUM SPEC-SCNC: 9.4 MG/DL (ref 8.2–9.6)
CHLORIDE SERPL-SCNC: 101 MMOL/L (ref 98–107)
CO2 SERPL-SCNC: 30 MMOL/L (ref 22–29)
CREAT SERPL-MCNC: 1.5 MG/DL (ref 0.57–1)
DEPRECATED RDW RBC AUTO: 47.2 FL (ref 37–54)
EOSINOPHIL # BLD AUTO: 0.16 10*3/MM3 (ref 0–0.4)
EOSINOPHIL NFR BLD AUTO: 2.6 % (ref 0.3–6.2)
ERYTHROCYTE [DISTWIDTH] IN BLOOD BY AUTOMATED COUNT: 13.3 % (ref 12.3–15.4)
GFR SERPL CREATININE-BSD FRML MDRD: 39 ML/MIN/1.73
GLOBULIN UR ELPH-MCNC: 3 GM/DL
GLUCOSE SERPL-MCNC: 138 MG/DL (ref 65–99)
HCT VFR BLD AUTO: 34.5 % (ref 34–46.6)
HGB BLD-MCNC: 11.2 G/DL (ref 12–15.9)
HOLD SPECIMEN: NORMAL
HOLD SPECIMEN: NORMAL
IMM GRANULOCYTES # BLD AUTO: 0.02 10*3/MM3 (ref 0–0.05)
IMM GRANULOCYTES NFR BLD AUTO: 0.3 % (ref 0–0.5)
INR PPP: 1.01 (ref 0.9–1.1)
LYMPHOCYTES # BLD AUTO: 0.93 10*3/MM3 (ref 0.7–3.1)
LYMPHOCYTES NFR BLD AUTO: 15.4 % (ref 19.6–45.3)
MCH RBC QN AUTO: 31.1 PG (ref 26.6–33)
MCHC RBC AUTO-ENTMCNC: 32.5 G/DL (ref 31.5–35.7)
MCV RBC AUTO: 95.8 FL (ref 79–97)
MONOCYTES # BLD AUTO: 0.57 10*3/MM3 (ref 0.1–0.9)
MONOCYTES NFR BLD AUTO: 9.4 % (ref 5–12)
NEUTROPHILS NFR BLD AUTO: 4.34 10*3/MM3 (ref 1.7–7)
NEUTROPHILS NFR BLD AUTO: 72 % (ref 42.7–76)
NRBC BLD AUTO-RTO: 0 /100 WBC (ref 0–0.2)
NT-PROBNP SERPL-MCNC: 1035 PG/ML (ref 0–1800)
PLATELET # BLD AUTO: 182 10*3/MM3 (ref 140–450)
PMV BLD AUTO: 10.6 FL (ref 6–12)
POTASSIUM SERPL-SCNC: 4.2 MMOL/L (ref 3.5–5.2)
PROT SERPL-MCNC: 6.9 G/DL (ref 6–8.5)
PROTHROMBIN TIME: 13.1 SECONDS (ref 11.7–14.2)
RBC # BLD AUTO: 3.6 10*6/MM3 (ref 3.77–5.28)
SODIUM SERPL-SCNC: 140 MMOL/L (ref 136–145)
TROPONIN T SERPL-MCNC: 0.01 NG/ML (ref 0–0.03)
WBC # BLD AUTO: 6.04 10*3/MM3 (ref 3.4–10.8)
WHOLE BLOOD HOLD SPECIMEN: NORMAL
WHOLE BLOOD HOLD SPECIMEN: NORMAL

## 2021-05-22 PROCEDURE — U0005 INFEC AGEN DETEC AMPLI PROBE: HCPCS | Performed by: EMERGENCY MEDICINE

## 2021-05-22 PROCEDURE — 93005 ELECTROCARDIOGRAM TRACING: CPT | Performed by: EMERGENCY MEDICINE

## 2021-05-22 PROCEDURE — 85025 COMPLETE CBC W/AUTO DIFF WBC: CPT | Performed by: EMERGENCY MEDICINE

## 2021-05-22 PROCEDURE — 83880 ASSAY OF NATRIURETIC PEPTIDE: CPT | Performed by: EMERGENCY MEDICINE

## 2021-05-22 PROCEDURE — 84484 ASSAY OF TROPONIN QUANT: CPT | Performed by: EMERGENCY MEDICINE

## 2021-05-22 PROCEDURE — 99285 EMERGENCY DEPT VISIT HI MDM: CPT

## 2021-05-22 PROCEDURE — 93005 ELECTROCARDIOGRAM TRACING: CPT

## 2021-05-22 PROCEDURE — 71045 X-RAY EXAM CHEST 1 VIEW: CPT

## 2021-05-22 PROCEDURE — 93971 EXTREMITY STUDY: CPT

## 2021-05-22 PROCEDURE — 80053 COMPREHEN METABOLIC PANEL: CPT | Performed by: EMERGENCY MEDICINE

## 2021-05-22 PROCEDURE — 85610 PROTHROMBIN TIME: CPT | Performed by: EMERGENCY MEDICINE

## 2021-05-22 PROCEDURE — 93010 ELECTROCARDIOGRAM REPORT: CPT | Performed by: INTERNAL MEDICINE

## 2021-05-22 PROCEDURE — U0004 COV-19 TEST NON-CDC HGH THRU: HCPCS | Performed by: EMERGENCY MEDICINE

## 2021-05-22 PROCEDURE — 25010000002 FUROSEMIDE PER 20 MG: Performed by: EMERGENCY MEDICINE

## 2021-05-22 RX ORDER — ACETAMINOPHEN 325 MG/1
650 TABLET ORAL EVERY 4 HOURS PRN
Status: DISCONTINUED | OUTPATIENT
Start: 2021-05-22 | End: 2021-05-28 | Stop reason: HOSPADM

## 2021-05-22 RX ORDER — SODIUM CHLORIDE 0.9 % (FLUSH) 0.9 %
10 SYRINGE (ML) INJECTION AS NEEDED
Status: DISCONTINUED | OUTPATIENT
Start: 2021-05-22 | End: 2021-05-28 | Stop reason: HOSPADM

## 2021-05-22 RX ORDER — ACETAMINOPHEN 160 MG/5ML
650 SOLUTION ORAL EVERY 4 HOURS PRN
Status: DISCONTINUED | OUTPATIENT
Start: 2021-05-22 | End: 2021-05-28 | Stop reason: HOSPADM

## 2021-05-22 RX ORDER — BLOOD SUGAR DIAGNOSTIC
STRIP MISCELLANEOUS
COMMUNITY
Start: 2021-05-04

## 2021-05-22 RX ORDER — FLURBIPROFEN SODIUM 0.3 MG/ML
400 SOLUTION/ DROPS OPHTHALMIC
COMMUNITY
Start: 2021-04-20

## 2021-05-22 RX ORDER — ACETAMINOPHEN 650 MG/1
650 SUPPOSITORY RECTAL EVERY 4 HOURS PRN
Status: DISCONTINUED | OUTPATIENT
Start: 2021-05-22 | End: 2021-05-28 | Stop reason: HOSPADM

## 2021-05-22 RX ORDER — ONDANSETRON 4 MG/1
4 TABLET, FILM COATED ORAL EVERY 6 HOURS PRN
Status: DISCONTINUED | OUTPATIENT
Start: 2021-05-22 | End: 2021-05-28 | Stop reason: HOSPADM

## 2021-05-22 RX ORDER — ONDANSETRON 2 MG/ML
4 INJECTION INTRAMUSCULAR; INTRAVENOUS EVERY 6 HOURS PRN
Status: DISCONTINUED | OUTPATIENT
Start: 2021-05-22 | End: 2021-05-28 | Stop reason: HOSPADM

## 2021-05-22 RX ORDER — BUMETANIDE 0.25 MG/ML
2 INJECTION INTRAMUSCULAR; INTRAVENOUS EVERY 12 HOURS
Status: DISCONTINUED | OUTPATIENT
Start: 2021-05-23 | End: 2021-05-26

## 2021-05-22 RX ORDER — FUROSEMIDE 10 MG/ML
40 INJECTION INTRAMUSCULAR; INTRAVENOUS ONCE
Status: COMPLETED | OUTPATIENT
Start: 2021-05-22 | End: 2021-05-22

## 2021-05-22 RX ORDER — NITROGLYCERIN 0.4 MG/1
0.4 TABLET SUBLINGUAL
Status: DISCONTINUED | OUTPATIENT
Start: 2021-05-22 | End: 2021-05-28 | Stop reason: HOSPADM

## 2021-05-22 RX ORDER — ALBUTEROL SULFATE 90 UG/1
2 AEROSOL, METERED RESPIRATORY (INHALATION)
COMMUNITY
End: 2021-05-22 | Stop reason: SDUPTHER

## 2021-05-22 RX ORDER — CALCIUM CARBONATE 200(500)MG
2 TABLET,CHEWABLE ORAL 2 TIMES DAILY PRN
Status: DISCONTINUED | OUTPATIENT
Start: 2021-05-22 | End: 2021-05-28 | Stop reason: HOSPADM

## 2021-05-22 RX ORDER — SODIUM CHLORIDE 0.9 % (FLUSH) 0.9 %
10 SYRINGE (ML) INJECTION EVERY 12 HOURS SCHEDULED
Status: DISCONTINUED | OUTPATIENT
Start: 2021-05-22 | End: 2021-05-28 | Stop reason: HOSPADM

## 2021-05-22 RX ADMIN — FUROSEMIDE 40 MG: 10 INJECTION, SOLUTION INTRAMUSCULAR; INTRAVENOUS at 22:00

## 2021-05-22 RX ADMIN — SODIUM CHLORIDE, PRESERVATIVE FREE 10 ML: 5 INJECTION INTRAVENOUS at 17:50

## 2021-05-23 PROBLEM — Z79.4 TYPE 2 DIABETES MELLITUS, WITH LONG-TERM CURRENT USE OF INSULIN (HCC): Status: ACTIVE | Noted: 2021-05-23

## 2021-05-23 PROBLEM — E03.9 HYPOTHYROIDISM: Status: RESOLVED | Noted: 2021-05-23 | Resolved: 2021-05-23

## 2021-05-23 PROBLEM — I10 HTN (HYPERTENSION): Status: ACTIVE | Noted: 2021-05-23

## 2021-05-23 PROBLEM — E03.9 HYPOTHYROIDISM: Status: ACTIVE | Noted: 2021-05-23

## 2021-05-23 PROBLEM — E11.9 TYPE 2 DIABETES MELLITUS, WITH LONG-TERM CURRENT USE OF INSULIN (HCC): Status: ACTIVE | Noted: 2021-05-23

## 2021-05-23 PROBLEM — N18.30 CKD (CHRONIC KIDNEY DISEASE) STAGE 3, GFR 30-59 ML/MIN: Status: ACTIVE | Noted: 2021-05-23

## 2021-05-23 LAB
ANION GAP SERPL CALCULATED.3IONS-SCNC: 8.7 MMOL/L (ref 5–15)
ANION GAP SERPL CALCULATED.3IONS-SCNC: 9.9 MMOL/L (ref 5–15)
BH CV LOWER VASCULAR LEFT COMMON FEMORAL AUGMENT: NORMAL
BH CV LOWER VASCULAR LEFT COMMON FEMORAL COMPETENT: NORMAL
BH CV LOWER VASCULAR LEFT COMMON FEMORAL COMPRESS: NORMAL
BH CV LOWER VASCULAR LEFT COMMON FEMORAL PHASIC: NORMAL
BH CV LOWER VASCULAR LEFT COMMON FEMORAL SPONT: NORMAL
BH CV LOWER VASCULAR LEFT DISTAL FEMORAL COMPRESS: NORMAL
BH CV LOWER VASCULAR LEFT GASTRONEMIUS COMPRESS: NORMAL
BH CV LOWER VASCULAR LEFT GREATER SAPH AK COMPRESS: NORMAL
BH CV LOWER VASCULAR LEFT GREATER SAPH BK COMPRESS: NORMAL
BH CV LOWER VASCULAR LEFT LESSER SAPH COMPRESS: NORMAL
BH CV LOWER VASCULAR LEFT MID FEMORAL AUGMENT: NORMAL
BH CV LOWER VASCULAR LEFT MID FEMORAL COMPETENT: NORMAL
BH CV LOWER VASCULAR LEFT MID FEMORAL COMPRESS: NORMAL
BH CV LOWER VASCULAR LEFT MID FEMORAL PHASIC: NORMAL
BH CV LOWER VASCULAR LEFT MID FEMORAL SPONT: NORMAL
BH CV LOWER VASCULAR LEFT PERONEAL COMPRESS: NORMAL
BH CV LOWER VASCULAR LEFT POPLITEAL AUGMENT: NORMAL
BH CV LOWER VASCULAR LEFT POPLITEAL COMPETENT: NORMAL
BH CV LOWER VASCULAR LEFT POPLITEAL COMPRESS: NORMAL
BH CV LOWER VASCULAR LEFT POPLITEAL PHASIC: NORMAL
BH CV LOWER VASCULAR LEFT POPLITEAL SPONT: NORMAL
BH CV LOWER VASCULAR LEFT POSTERIOR TIBIAL COMPRESS: NORMAL
BH CV LOWER VASCULAR LEFT PROFUNDA FEMORAL COMPRESS: NORMAL
BH CV LOWER VASCULAR LEFT PROXIMAL FEMORAL COMPRESS: NORMAL
BH CV LOWER VASCULAR LEFT SAPHENOFEMORAL JUNCTION COMPRESS: NORMAL
BH CV LOWER VASCULAR RIGHT COMMON FEMORAL AUGMENT: NORMAL
BH CV LOWER VASCULAR RIGHT COMMON FEMORAL COMPETENT: NORMAL
BH CV LOWER VASCULAR RIGHT COMMON FEMORAL COMPRESS: NORMAL
BH CV LOWER VASCULAR RIGHT COMMON FEMORAL PHASIC: NORMAL
BH CV LOWER VASCULAR RIGHT COMMON FEMORAL SPONT: NORMAL
BUN SERPL-MCNC: 43 MG/DL (ref 8–23)
BUN SERPL-MCNC: 44 MG/DL (ref 8–23)
BUN/CREAT SERPL: 33.1 (ref 7–25)
BUN/CREAT SERPL: 36.4 (ref 7–25)
CALCIUM SPEC-SCNC: 8.8 MG/DL (ref 8.2–9.6)
CALCIUM SPEC-SCNC: 9.1 MG/DL (ref 8.2–9.6)
CHLORIDE SERPL-SCNC: 100 MMOL/L (ref 98–107)
CHLORIDE SERPL-SCNC: 99 MMOL/L (ref 98–107)
CO2 SERPL-SCNC: 29.1 MMOL/L (ref 22–29)
CO2 SERPL-SCNC: 30.3 MMOL/L (ref 22–29)
CREAT SERPL-MCNC: 1.18 MG/DL (ref 0.57–1)
CREAT SERPL-MCNC: 1.33 MG/DL (ref 0.57–1)
DEPRECATED RDW RBC AUTO: 46.9 FL (ref 37–54)
ERYTHROCYTE [DISTWIDTH] IN BLOOD BY AUTOMATED COUNT: 13.4 % (ref 12.3–15.4)
GFR SERPL CREATININE-BSD FRML MDRD: 45 ML/MIN/1.73
GFR SERPL CREATININE-BSD FRML MDRD: 52 ML/MIN/1.73
GLUCOSE BLDC GLUCOMTR-MCNC: 115 MG/DL (ref 70–130)
GLUCOSE BLDC GLUCOMTR-MCNC: 180 MG/DL (ref 70–130)
GLUCOSE BLDC GLUCOMTR-MCNC: 232 MG/DL (ref 70–130)
GLUCOSE BLDC GLUCOMTR-MCNC: 265 MG/DL (ref 70–130)
GLUCOSE BLDC GLUCOMTR-MCNC: 298 MG/DL (ref 70–130)
GLUCOSE SERPL-MCNC: 152 MG/DL (ref 65–99)
GLUCOSE SERPL-MCNC: 211 MG/DL (ref 65–99)
HBA1C MFR BLD: 6.22 % (ref 4.8–5.6)
HCT VFR BLD AUTO: 33.4 % (ref 34–46.6)
HGB BLD-MCNC: 10.7 G/DL (ref 12–15.9)
MAXIMAL PREDICTED HEART RATE: 129 BPM
MCH RBC QN AUTO: 30.6 PG (ref 26.6–33)
MCHC RBC AUTO-ENTMCNC: 32 G/DL (ref 31.5–35.7)
MCV RBC AUTO: 95.4 FL (ref 79–97)
PLATELET # BLD AUTO: 162 10*3/MM3 (ref 140–450)
PMV BLD AUTO: 10.1 FL (ref 6–12)
POTASSIUM SERPL-SCNC: 3.4 MMOL/L (ref 3.5–5.2)
POTASSIUM SERPL-SCNC: 4.3 MMOL/L (ref 3.5–5.2)
QT INTERVAL: 448 MS
RBC # BLD AUTO: 3.5 10*6/MM3 (ref 3.77–5.28)
SARS-COV-2 ORF1AB RESP QL NAA+PROBE: NOT DETECTED
SODIUM SERPL-SCNC: 138 MMOL/L (ref 136–145)
SODIUM SERPL-SCNC: 139 MMOL/L (ref 136–145)
STRESS TARGET HR: 110 BPM
WBC # BLD AUTO: 5.85 10*3/MM3 (ref 3.4–10.8)

## 2021-05-23 PROCEDURE — 94799 UNLISTED PULMONARY SVC/PX: CPT

## 2021-05-23 PROCEDURE — 36415 COLL VENOUS BLD VENIPUNCTURE: CPT | Performed by: NURSE PRACTITIONER

## 2021-05-23 PROCEDURE — 82962 GLUCOSE BLOOD TEST: CPT

## 2021-05-23 PROCEDURE — 63710000001 INSULIN GLARGINE PER 5 UNITS: Performed by: NURSE PRACTITIONER

## 2021-05-23 PROCEDURE — 83036 HEMOGLOBIN GLYCOSYLATED A1C: CPT | Performed by: NURSE PRACTITIONER

## 2021-05-23 PROCEDURE — 94660 CPAP INITIATION&MGMT: CPT

## 2021-05-23 PROCEDURE — 99222 1ST HOSP IP/OBS MODERATE 55: CPT | Performed by: INTERNAL MEDICINE

## 2021-05-23 PROCEDURE — 80048 BASIC METABOLIC PNL TOTAL CA: CPT | Performed by: NURSE PRACTITIONER

## 2021-05-23 PROCEDURE — 63710000001 INSULIN LISPRO (HUMAN) PER 5 UNITS: Performed by: NURSE PRACTITIONER

## 2021-05-23 PROCEDURE — 25010000002 HYDRALAZINE PER 20 MG: Performed by: INTERNAL MEDICINE

## 2021-05-23 PROCEDURE — 85027 COMPLETE CBC AUTOMATED: CPT | Performed by: NURSE PRACTITIONER

## 2021-05-23 PROCEDURE — 80048 BASIC METABOLIC PNL TOTAL CA: CPT | Performed by: HOSPITALIST

## 2021-05-23 RX ORDER — INSULIN GLARGINE 100 [IU]/ML
22 INJECTION, SOLUTION SUBCUTANEOUS NIGHTLY
Status: DISCONTINUED | OUTPATIENT
Start: 2021-05-23 | End: 2021-05-26

## 2021-05-23 RX ORDER — ASPIRIN 81 MG/1
81 TABLET ORAL DAILY
Status: DISCONTINUED | OUTPATIENT
Start: 2021-05-23 | End: 2021-05-28 | Stop reason: HOSPADM

## 2021-05-23 RX ORDER — BUPROPION HYDROCHLORIDE 75 MG/1
75 TABLET ORAL EVERY 12 HOURS SCHEDULED
Status: DISCONTINUED | OUTPATIENT
Start: 2021-05-23 | End: 2021-05-28 | Stop reason: HOSPADM

## 2021-05-23 RX ORDER — PRAVASTATIN SODIUM 40 MG
40 TABLET ORAL DAILY
Status: DISCONTINUED | OUTPATIENT
Start: 2021-05-23 | End: 2021-05-28 | Stop reason: HOSPADM

## 2021-05-23 RX ORDER — HYDROCODONE BITARTRATE AND ACETAMINOPHEN 5; 325 MG/1; MG/1
1 TABLET ORAL 4 TIMES DAILY PRN
Status: DISCONTINUED | OUTPATIENT
Start: 2021-05-23 | End: 2021-05-28 | Stop reason: HOSPADM

## 2021-05-23 RX ORDER — INSULIN LISPRO 100 [IU]/ML
0-9 INJECTION, SOLUTION INTRAVENOUS; SUBCUTANEOUS
Status: DISCONTINUED | OUTPATIENT
Start: 2021-05-23 | End: 2021-05-26

## 2021-05-23 RX ORDER — HYDRALAZINE HYDROCHLORIDE 10 MG/1
10 TABLET, FILM COATED ORAL 3 TIMES DAILY
Status: DISCONTINUED | OUTPATIENT
Start: 2021-05-23 | End: 2021-05-23

## 2021-05-23 RX ORDER — ISOSORBIDE MONONITRATE 60 MG/1
60 TABLET, EXTENDED RELEASE ORAL DAILY
Status: DISCONTINUED | OUTPATIENT
Start: 2021-05-23 | End: 2021-05-27

## 2021-05-23 RX ORDER — NICOTINE POLACRILEX 4 MG
15 LOZENGE BUCCAL
Status: DISCONTINUED | OUTPATIENT
Start: 2021-05-23 | End: 2021-05-28 | Stop reason: HOSPADM

## 2021-05-23 RX ORDER — HYDRALAZINE HYDROCHLORIDE 20 MG/ML
10 INJECTION INTRAMUSCULAR; INTRAVENOUS ONCE
Status: COMPLETED | OUTPATIENT
Start: 2021-05-23 | End: 2021-05-23

## 2021-05-23 RX ORDER — METOPROLOL SUCCINATE 100 MG/1
100 TABLET, EXTENDED RELEASE ORAL DAILY
Status: DISCONTINUED | OUTPATIENT
Start: 2021-05-23 | End: 2021-05-23

## 2021-05-23 RX ORDER — DILTIAZEM HYDROCHLORIDE 120 MG/1
120 CAPSULE, COATED, EXTENDED RELEASE ORAL
Status: DISCONTINUED | OUTPATIENT
Start: 2021-05-23 | End: 2021-05-28 | Stop reason: HOSPADM

## 2021-05-23 RX ORDER — HYDRALAZINE HYDROCHLORIDE 25 MG/1
25 TABLET, FILM COATED ORAL 3 TIMES DAILY
Status: DISCONTINUED | OUTPATIENT
Start: 2021-05-23 | End: 2021-05-27

## 2021-05-23 RX ORDER — DEXTROSE MONOHYDRATE 25 G/50ML
25 INJECTION, SOLUTION INTRAVENOUS
Status: DISCONTINUED | OUTPATIENT
Start: 2021-05-23 | End: 2021-05-28 | Stop reason: HOSPADM

## 2021-05-23 RX ADMIN — HYDRALAZINE HYDROCHLORIDE 25 MG: 25 TABLET, FILM COATED ORAL at 15:20

## 2021-05-23 RX ADMIN — ASPIRIN 81 MG: 81 TABLET, COATED ORAL at 09:58

## 2021-05-23 RX ADMIN — BUPROPION HYDROCHLORIDE 75 MG: 75 TABLET, FILM COATED ORAL at 09:58

## 2021-05-23 RX ADMIN — INSULIN GLARGINE 22 UNITS: 100 INJECTION, SOLUTION SUBCUTANEOUS at 03:30

## 2021-05-23 RX ADMIN — INSULIN GLARGINE 22 UNITS: 100 INJECTION, SOLUTION SUBCUTANEOUS at 20:28

## 2021-05-23 RX ADMIN — PRAVASTATIN SODIUM 40 MG: 40 TABLET ORAL at 09:59

## 2021-05-23 RX ADMIN — DILTIAZEM HYDROCHLORIDE 120 MG: 120 CAPSULE, COATED, EXTENDED RELEASE ORAL at 09:58

## 2021-05-23 RX ADMIN — ISOSORBIDE MONONITRATE 60 MG: 60 TABLET ORAL at 09:58

## 2021-05-23 RX ADMIN — BUPROPION HYDROCHLORIDE 75 MG: 75 TABLET, FILM COATED ORAL at 03:40

## 2021-05-23 RX ADMIN — INSULIN LISPRO 4 UNITS: 100 INJECTION, SOLUTION INTRAVENOUS; SUBCUTANEOUS at 09:58

## 2021-05-23 RX ADMIN — INSULIN LISPRO 6 UNITS: 100 INJECTION, SOLUTION INTRAVENOUS; SUBCUTANEOUS at 20:32

## 2021-05-23 RX ADMIN — BUPROPION HYDROCHLORIDE 75 MG: 75 TABLET, FILM COATED ORAL at 23:17

## 2021-05-23 RX ADMIN — HYDRALAZINE HYDROCHLORIDE 25 MG: 25 TABLET, FILM COATED ORAL at 09:58

## 2021-05-23 RX ADMIN — INSULIN LISPRO 6 UNITS: 100 INJECTION, SOLUTION INTRAVENOUS; SUBCUTANEOUS at 18:07

## 2021-05-23 RX ADMIN — SODIUM CHLORIDE, PRESERVATIVE FREE 10 ML: 5 INJECTION INTRAVENOUS at 20:34

## 2021-05-23 RX ADMIN — SODIUM CHLORIDE, PRESERVATIVE FREE 10 ML: 5 INJECTION INTRAVENOUS at 09:59

## 2021-05-23 RX ADMIN — INSULIN LISPRO 2 UNITS: 100 INJECTION, SOLUTION INTRAVENOUS; SUBCUTANEOUS at 12:48

## 2021-05-23 RX ADMIN — BUMETANIDE 2 MG: 0.25 INJECTION INTRAMUSCULAR; INTRAVENOUS at 20:33

## 2021-05-23 RX ADMIN — BUMETANIDE 2 MG: 0.25 INJECTION INTRAMUSCULAR; INTRAVENOUS at 09:59

## 2021-05-23 RX ADMIN — HYDRALAZINE HYDROCHLORIDE 10 MG: 20 INJECTION INTRAMUSCULAR; INTRAVENOUS at 06:14

## 2021-05-23 RX ADMIN — METOPROLOL SUCCINATE 150 MG: 100 TABLET, EXTENDED RELEASE ORAL at 20:33

## 2021-05-23 RX ADMIN — HYDRALAZINE HYDROCHLORIDE 25 MG: 25 TABLET, FILM COATED ORAL at 20:33

## 2021-05-23 RX ADMIN — METOROPROLOL TARTRATE 5 MG: 5 INJECTION, SOLUTION INTRAVENOUS at 06:17

## 2021-05-24 ENCOUNTER — APPOINTMENT (OUTPATIENT)
Dept: CARDIOLOGY | Facility: HOSPITAL | Age: 86
End: 2021-05-24

## 2021-05-24 LAB
ASCENDING AORTA: 3 CM
BH CV ECHO HCM PROVOKED PEAK GRADIENT: 5 MMHG
BH CV ECHO MEAS - ACS: 1.7 CM
BH CV ECHO MEAS - AO MAX PG: 7 MMHG
BH CV ECHO MEAS - AO MEAN PG (FULL): 1 MMHG
BH CV ECHO MEAS - AO MEAN PG: 3 MMHG
BH CV ECHO MEAS - AO ROOT AREA (BSA CORRECTED): 2.3
BH CV ECHO MEAS - AO ROOT AREA: 11.3 CM^2
BH CV ECHO MEAS - AO ROOT DIAM: 3.8 CM
BH CV ECHO MEAS - AO V2 MAX: 129 CM/SEC
BH CV ECHO MEAS - AO V2 MEAN: 73.9 CM/SEC
BH CV ECHO MEAS - AO V2 VTI: 25.3 CM
BH CV ECHO MEAS - AVA(I,A): 2.6 CM^2
BH CV ECHO MEAS - AVA(I,D): 2.6 CM^2
BH CV ECHO MEAS - BSA(HAYCOCK): 1.7 M^2
BH CV ECHO MEAS - BSA: 1.7 M^2
BH CV ECHO MEAS - BZI_BMI: 29.7 KILOGRAMS/M^2
BH CV ECHO MEAS - BZI_METRIC_HEIGHT: 152.4 CM
BH CV ECHO MEAS - BZI_METRIC_WEIGHT: 68.9 KG
BH CV ECHO MEAS - EDV(MOD-SP2): 71 ML
BH CV ECHO MEAS - EDV(MOD-SP4): 71 ML
BH CV ECHO MEAS - EDV(TEICH): 107.5 ML
BH CV ECHO MEAS - EF(CUBED): 67.5 %
BH CV ECHO MEAS - EF(MOD-BP): 63 %
BH CV ECHO MEAS - EF(MOD-SP2): 64.8 %
BH CV ECHO MEAS - EF(MOD-SP4): 57.7 %
BH CV ECHO MEAS - EF(TEICH): 59 %
BH CV ECHO MEAS - ESV(MOD-SP2): 25 ML
BH CV ECHO MEAS - ESV(MOD-SP4): 30 ML
BH CV ECHO MEAS - ESV(TEICH): 44.1 ML
BH CV ECHO MEAS - FS: 31.3 %
BH CV ECHO MEAS - IVS/LVPW: 1
BH CV ECHO MEAS - IVSD: 1.1 CM
BH CV ECHO MEAS - LV DIASTOLIC VOL/BSA (35-75): 42.7 ML/M^2
BH CV ECHO MEAS - LV MASS(C)D: 194 GRAMS
BH CV ECHO MEAS - LV MASS(C)DI: 116.8 GRAMS/M^2
BH CV ECHO MEAS - LV MAX PG: 5 MMHG
BH CV ECHO MEAS - LV MEAN PG: 2 MMHG
BH CV ECHO MEAS - LV SYSTOLIC VOL/BSA (12-30): 18.1 ML/M^2
BH CV ECHO MEAS - LV V1 MAX: 117 CM/SEC
BH CV ECHO MEAS - LV V1 MEAN: 66 CM/SEC
BH CV ECHO MEAS - LV V1 VTI: 20.3 CM
BH CV ECHO MEAS - LVIDD: 4.8 CM
BH CV ECHO MEAS - LVIDS: 3.3 CM
BH CV ECHO MEAS - LVLD AP2: 7.3 CM
BH CV ECHO MEAS - LVLD AP4: 7 CM
BH CV ECHO MEAS - LVLS AP2: 5.8 CM
BH CV ECHO MEAS - LVLS AP4: 6 CM
BH CV ECHO MEAS - LVOT AREA: 3.2 CM^2
BH CV ECHO MEAS - LVOT DIAM: 2 CM
BH CV ECHO MEAS - LVPWD: 1.1 CM
BH CV ECHO MEAS - MR MAX PG: 30.8 MMHG
BH CV ECHO MEAS - MR MAX VEL: 273.5 CM/SEC
BH CV ECHO MEAS - MV DEC SLOPE: 1175 CM/SEC^2
BH CV ECHO MEAS - MV DEC TIME: 0.1 SEC
BH CV ECHO MEAS - MV E MAX VEL: 120 CM/SEC
BH CV ECHO MEAS - MV MEAN PG: 3 MMHG
BH CV ECHO MEAS - MV P1/2T MAX VEL: 124 CM/SEC
BH CV ECHO MEAS - MV P1/2T: 30.9 MSEC
BH CV ECHO MEAS - MV V2 MEAN: 78.9 CM/SEC
BH CV ECHO MEAS - MV V2 VTI: 16.2 CM
BH CV ECHO MEAS - MVA P1/2T LCG: 1.8 CM^2
BH CV ECHO MEAS - MVA(P1/2T): 7.1 CM^2
BH CV ECHO MEAS - MVA(VTI): 4 CM^2
BH CV ECHO MEAS - PA MAX PG (FULL): 0.3 MMHG
BH CV ECHO MEAS - PA MAX PG: 3.6 MMHG
BH CV ECHO MEAS - PA V2 MAX: 95 CM/SEC
BH CV ECHO MEAS - PULM A REVS DUR: 0.1 SEC
BH CV ECHO MEAS - PULM A REVS VEL: 44.4 CM/SEC
BH CV ECHO MEAS - PULM DIAS VEL: 63.2 CM/SEC
BH CV ECHO MEAS - PULM S/D: 1
BH CV ECHO MEAS - PULM SYS VEL: 63.2 CM/SEC
BH CV ECHO MEAS - RAP SYSTOLE: 15 MMHG
BH CV ECHO MEAS - RV MAX PG: 3.3 MMHG
BH CV ECHO MEAS - RV MEAN PG: 2 MMHG
BH CV ECHO MEAS - RV V1 MAX: 90.9 CM/SEC
BH CV ECHO MEAS - RV V1 MEAN: 61.8 CM/SEC
BH CV ECHO MEAS - RV V1 VTI: 16.2 CM
BH CV ECHO MEAS - RVSP: 42 MMHG
BH CV ECHO MEAS - SI(AO): 172.7 ML/M^2
BH CV ECHO MEAS - SI(CUBED): 44.9 ML/M^2
BH CV ECHO MEAS - SI(LVOT): 38.9 ML/M^2
BH CV ECHO MEAS - SI(MOD-SP2): 27.7 ML/M^2
BH CV ECHO MEAS - SI(MOD-SP4): 24.7 ML/M^2
BH CV ECHO MEAS - SI(TEICH): 38.2 ML/M^2
BH CV ECHO MEAS - SUP REN AO DIAM: 2 CM
BH CV ECHO MEAS - SV(AO): 286.9 ML
BH CV ECHO MEAS - SV(CUBED): 74.7 ML
BH CV ECHO MEAS - SV(LVOT): 64.6 ML
BH CV ECHO MEAS - SV(MOD-SP2): 46 ML
BH CV ECHO MEAS - SV(MOD-SP4): 41 ML
BH CV ECHO MEAS - SV(TEICH): 63.4 ML
BH CV ECHO MEAS - TAPSE (>1.6): 1.6 CM
BH CV ECHO MEAS - TR MAX VEL: 258 CM/SEC
BH CV XLRA - RV BASE: 2.9 CM
BH CV XLRA - RV LENGTH: 5.7 CM
BH CV XLRA - RV MID: 2 CM
BH CV XLRA - TDI S': 6 CM/SEC
GLUCOSE BLDC GLUCOMTR-MCNC: 147 MG/DL (ref 70–130)
GLUCOSE BLDC GLUCOMTR-MCNC: 205 MG/DL (ref 70–130)
GLUCOSE BLDC GLUCOMTR-MCNC: 219 MG/DL (ref 70–130)
GLUCOSE BLDC GLUCOMTR-MCNC: 96 MG/DL (ref 70–130)
LEFT ATRIUM VOLUME INDEX: 31 ML/M2
LV EF 2D ECHO EST: 65 %
SINUS: 3.6 CM
STJ: 2.5 CM

## 2021-05-24 PROCEDURE — 63710000001 INSULIN GLARGINE PER 5 UNITS: Performed by: NURSE PRACTITIONER

## 2021-05-24 PROCEDURE — 93306 TTE W/DOPPLER COMPLETE: CPT

## 2021-05-24 PROCEDURE — 82962 GLUCOSE BLOOD TEST: CPT

## 2021-05-24 PROCEDURE — 63710000001 INSULIN LISPRO (HUMAN) PER 5 UNITS: Performed by: NURSE PRACTITIONER

## 2021-05-24 PROCEDURE — 99232 SBSQ HOSP IP/OBS MODERATE 35: CPT | Performed by: INTERNAL MEDICINE

## 2021-05-24 PROCEDURE — 97161 PT EVAL LOW COMPLEX 20 MIN: CPT

## 2021-05-24 PROCEDURE — 93306 TTE W/DOPPLER COMPLETE: CPT | Performed by: INTERNAL MEDICINE

## 2021-05-24 RX ADMIN — BUMETANIDE 2 MG: 0.25 INJECTION INTRAMUSCULAR; INTRAVENOUS at 10:08

## 2021-05-24 RX ADMIN — BUMETANIDE 2 MG: 0.25 INJECTION INTRAMUSCULAR; INTRAVENOUS at 20:54

## 2021-05-24 RX ADMIN — SODIUM CHLORIDE, PRESERVATIVE FREE 10 ML: 5 INJECTION INTRAVENOUS at 20:51

## 2021-05-24 RX ADMIN — DILTIAZEM HYDROCHLORIDE 120 MG: 120 CAPSULE, COATED, EXTENDED RELEASE ORAL at 10:09

## 2021-05-24 RX ADMIN — SODIUM CHLORIDE, PRESERVATIVE FREE 10 ML: 5 INJECTION INTRAVENOUS at 10:08

## 2021-05-24 RX ADMIN — PRAVASTATIN SODIUM 40 MG: 40 TABLET ORAL at 10:09

## 2021-05-24 RX ADMIN — HYDRALAZINE HYDROCHLORIDE 25 MG: 25 TABLET, FILM COATED ORAL at 20:49

## 2021-05-24 RX ADMIN — HYDRALAZINE HYDROCHLORIDE 25 MG: 25 TABLET, FILM COATED ORAL at 10:09

## 2021-05-24 RX ADMIN — ISOSORBIDE MONONITRATE 60 MG: 60 TABLET ORAL at 10:09

## 2021-05-24 RX ADMIN — INSULIN GLARGINE 22 UNITS: 100 INJECTION, SOLUTION SUBCUTANEOUS at 20:49

## 2021-05-24 RX ADMIN — INSULIN LISPRO 4 UNITS: 100 INJECTION, SOLUTION INTRAVENOUS; SUBCUTANEOUS at 16:49

## 2021-05-24 RX ADMIN — HYDRALAZINE HYDROCHLORIDE 25 MG: 25 TABLET, FILM COATED ORAL at 16:50

## 2021-05-24 RX ADMIN — BUPROPION HYDROCHLORIDE 75 MG: 75 TABLET, FILM COATED ORAL at 20:49

## 2021-05-24 RX ADMIN — METOPROLOL SUCCINATE 150 MG: 100 TABLET, EXTENDED RELEASE ORAL at 17:49

## 2021-05-24 RX ADMIN — INSULIN LISPRO 4 UNITS: 100 INJECTION, SOLUTION INTRAVENOUS; SUBCUTANEOUS at 20:49

## 2021-05-24 RX ADMIN — ASPIRIN 81 MG: 81 TABLET, COATED ORAL at 10:09

## 2021-05-24 RX ADMIN — BUPROPION HYDROCHLORIDE 75 MG: 75 TABLET, FILM COATED ORAL at 10:10

## 2021-05-25 LAB
ANION GAP SERPL CALCULATED.3IONS-SCNC: 7.3 MMOL/L (ref 5–15)
BUN SERPL-MCNC: 36 MG/DL (ref 8–23)
BUN/CREAT SERPL: 31 (ref 7–25)
CALCIUM SPEC-SCNC: 8.8 MG/DL (ref 8.2–9.6)
CHLORIDE SERPL-SCNC: 101 MMOL/L (ref 98–107)
CO2 SERPL-SCNC: 32.7 MMOL/L (ref 22–29)
CREAT SERPL-MCNC: 1.16 MG/DL (ref 0.57–1)
GFR SERPL CREATININE-BSD FRML MDRD: 53 ML/MIN/1.73
GLUCOSE BLDC GLUCOMTR-MCNC: 169 MG/DL (ref 70–130)
GLUCOSE BLDC GLUCOMTR-MCNC: 227 MG/DL (ref 70–130)
GLUCOSE BLDC GLUCOMTR-MCNC: 240 MG/DL (ref 70–130)
GLUCOSE BLDC GLUCOMTR-MCNC: 40 MG/DL (ref 70–130)
GLUCOSE BLDC GLUCOMTR-MCNC: 77 MG/DL (ref 70–130)
GLUCOSE SERPL-MCNC: 48 MG/DL (ref 65–99)
POTASSIUM SERPL-SCNC: 3.9 MMOL/L (ref 3.5–5.2)
SODIUM SERPL-SCNC: 141 MMOL/L (ref 136–145)

## 2021-05-25 PROCEDURE — 63710000001 INSULIN GLARGINE PER 5 UNITS: Performed by: NURSE PRACTITIONER

## 2021-05-25 PROCEDURE — 63710000001 INSULIN LISPRO (HUMAN) PER 5 UNITS: Performed by: NURSE PRACTITIONER

## 2021-05-25 PROCEDURE — 80048 BASIC METABOLIC PNL TOTAL CA: CPT | Performed by: INTERNAL MEDICINE

## 2021-05-25 PROCEDURE — 82962 GLUCOSE BLOOD TEST: CPT

## 2021-05-25 PROCEDURE — 99232 SBSQ HOSP IP/OBS MODERATE 35: CPT | Performed by: INTERNAL MEDICINE

## 2021-05-25 RX ADMIN — BUMETANIDE 2 MG: 0.25 INJECTION INTRAMUSCULAR; INTRAVENOUS at 21:10

## 2021-05-25 RX ADMIN — SODIUM CHLORIDE, PRESERVATIVE FREE 10 ML: 5 INJECTION INTRAVENOUS at 21:10

## 2021-05-25 RX ADMIN — INSULIN LISPRO 4 UNITS: 100 INJECTION, SOLUTION INTRAVENOUS; SUBCUTANEOUS at 21:10

## 2021-05-25 RX ADMIN — INSULIN GLARGINE 22 UNITS: 100 INJECTION, SOLUTION SUBCUTANEOUS at 21:10

## 2021-05-25 RX ADMIN — ASPIRIN 81 MG: 81 TABLET, COATED ORAL at 09:26

## 2021-05-25 RX ADMIN — HYDRALAZINE HYDROCHLORIDE 25 MG: 25 TABLET, FILM COATED ORAL at 16:14

## 2021-05-25 RX ADMIN — ISOSORBIDE MONONITRATE 60 MG: 60 TABLET ORAL at 09:26

## 2021-05-25 RX ADMIN — INSULIN LISPRO 4 UNITS: 100 INJECTION, SOLUTION INTRAVENOUS; SUBCUTANEOUS at 13:52

## 2021-05-25 RX ADMIN — HYDRALAZINE HYDROCHLORIDE 25 MG: 25 TABLET, FILM COATED ORAL at 21:09

## 2021-05-25 RX ADMIN — PRAVASTATIN SODIUM 40 MG: 40 TABLET ORAL at 09:26

## 2021-05-25 RX ADMIN — HYDRALAZINE HYDROCHLORIDE 25 MG: 25 TABLET, FILM COATED ORAL at 09:26

## 2021-05-25 RX ADMIN — SODIUM CHLORIDE, PRESERVATIVE FREE 10 ML: 5 INJECTION INTRAVENOUS at 09:27

## 2021-05-25 RX ADMIN — BUMETANIDE 2 MG: 0.25 INJECTION INTRAMUSCULAR; INTRAVENOUS at 09:27

## 2021-05-25 RX ADMIN — INSULIN LISPRO 2 UNITS: 100 INJECTION, SOLUTION INTRAVENOUS; SUBCUTANEOUS at 18:49

## 2021-05-25 RX ADMIN — BUPROPION HYDROCHLORIDE 75 MG: 75 TABLET, FILM COATED ORAL at 21:09

## 2021-05-25 RX ADMIN — DILTIAZEM HYDROCHLORIDE 120 MG: 120 CAPSULE, COATED, EXTENDED RELEASE ORAL at 09:22

## 2021-05-25 RX ADMIN — BUPROPION HYDROCHLORIDE 75 MG: 75 TABLET, FILM COATED ORAL at 09:26

## 2021-05-25 RX ADMIN — METOPROLOL SUCCINATE 150 MG: 100 TABLET, EXTENDED RELEASE ORAL at 21:09

## 2021-05-26 PROBLEM — N17.9 AKI (ACUTE KIDNEY INJURY) (HCC): Status: ACTIVE | Noted: 2021-05-26

## 2021-05-26 LAB
ANION GAP SERPL CALCULATED.3IONS-SCNC: 8.8 MMOL/L (ref 5–15)
BUN SERPL-MCNC: 45 MG/DL (ref 8–23)
BUN/CREAT SERPL: 27.4 (ref 7–25)
CALCIUM SPEC-SCNC: 8.6 MG/DL (ref 8.2–9.6)
CHLORIDE SERPL-SCNC: 98 MMOL/L (ref 98–107)
CO2 SERPL-SCNC: 30.2 MMOL/L (ref 22–29)
CREAT SERPL-MCNC: 1.64 MG/DL (ref 0.57–1)
GFR SERPL CREATININE-BSD FRML MDRD: 36 ML/MIN/1.73
GLUCOSE BLDC GLUCOMTR-MCNC: 159 MG/DL (ref 70–130)
GLUCOSE BLDC GLUCOMTR-MCNC: 210 MG/DL (ref 70–130)
GLUCOSE BLDC GLUCOMTR-MCNC: 222 MG/DL (ref 70–130)
GLUCOSE BLDC GLUCOMTR-MCNC: 284 MG/DL (ref 70–130)
GLUCOSE SERPL-MCNC: 249 MG/DL (ref 65–99)
POTASSIUM SERPL-SCNC: 4.4 MMOL/L (ref 3.5–5.2)
SODIUM SERPL-SCNC: 137 MMOL/L (ref 136–145)

## 2021-05-26 PROCEDURE — 97116 GAIT TRAINING THERAPY: CPT

## 2021-05-26 PROCEDURE — 80048 BASIC METABOLIC PNL TOTAL CA: CPT | Performed by: HOSPITALIST

## 2021-05-26 PROCEDURE — 82962 GLUCOSE BLOOD TEST: CPT

## 2021-05-26 PROCEDURE — 63710000001 INSULIN GLARGINE PER 5 UNITS: Performed by: HOSPITALIST

## 2021-05-26 PROCEDURE — 63710000001 INSULIN LISPRO (HUMAN) PER 5 UNITS: Performed by: NURSE PRACTITIONER

## 2021-05-26 PROCEDURE — 63710000001 INSULIN LISPRO (HUMAN) PER 5 UNITS: Performed by: HOSPITALIST

## 2021-05-26 PROCEDURE — 97530 THERAPEUTIC ACTIVITIES: CPT

## 2021-05-26 PROCEDURE — 99232 SBSQ HOSP IP/OBS MODERATE 35: CPT | Performed by: INTERNAL MEDICINE

## 2021-05-26 RX ORDER — INSULIN GLARGINE 100 [IU]/ML
12 INJECTION, SOLUTION SUBCUTANEOUS NIGHTLY
Status: DISCONTINUED | OUTPATIENT
Start: 2021-05-26 | End: 2021-05-27

## 2021-05-26 RX ORDER — INSULIN LISPRO 100 [IU]/ML
0-7 INJECTION, SOLUTION INTRAVENOUS; SUBCUTANEOUS
Status: DISCONTINUED | OUTPATIENT
Start: 2021-05-26 | End: 2021-05-28 | Stop reason: HOSPADM

## 2021-05-26 RX ADMIN — HYDRALAZINE HYDROCHLORIDE 25 MG: 25 TABLET, FILM COATED ORAL at 08:19

## 2021-05-26 RX ADMIN — INSULIN LISPRO 3 UNITS: 100 INJECTION, SOLUTION INTRAVENOUS; SUBCUTANEOUS at 21:20

## 2021-05-26 RX ADMIN — METOPROLOL SUCCINATE 150 MG: 100 TABLET, EXTENDED RELEASE ORAL at 21:20

## 2021-05-26 RX ADMIN — BUPROPION HYDROCHLORIDE 75 MG: 75 TABLET, FILM COATED ORAL at 21:20

## 2021-05-26 RX ADMIN — PRAVASTATIN SODIUM 40 MG: 40 TABLET ORAL at 08:19

## 2021-05-26 RX ADMIN — DILTIAZEM HYDROCHLORIDE 120 MG: 120 CAPSULE, COATED, EXTENDED RELEASE ORAL at 08:19

## 2021-05-26 RX ADMIN — INSULIN LISPRO 4 UNITS: 100 INJECTION, SOLUTION INTRAVENOUS; SUBCUTANEOUS at 08:18

## 2021-05-26 RX ADMIN — SODIUM CHLORIDE, PRESERVATIVE FREE 10 ML: 5 INJECTION INTRAVENOUS at 21:21

## 2021-05-26 RX ADMIN — BUPROPION HYDROCHLORIDE 75 MG: 75 TABLET, FILM COATED ORAL at 08:19

## 2021-05-26 RX ADMIN — HYDRALAZINE HYDROCHLORIDE 25 MG: 25 TABLET, FILM COATED ORAL at 21:20

## 2021-05-26 RX ADMIN — HYDRALAZINE HYDROCHLORIDE 25 MG: 25 TABLET, FILM COATED ORAL at 15:36

## 2021-05-26 RX ADMIN — INSULIN LISPRO 4 UNITS: 100 INJECTION, SOLUTION INTRAVENOUS; SUBCUTANEOUS at 17:25

## 2021-05-26 RX ADMIN — SODIUM CHLORIDE, PRESERVATIVE FREE 10 ML: 5 INJECTION INTRAVENOUS at 08:18

## 2021-05-26 RX ADMIN — INSULIN LISPRO 2 UNITS: 100 INJECTION, SOLUTION INTRAVENOUS; SUBCUTANEOUS at 12:29

## 2021-05-26 RX ADMIN — ASPIRIN 81 MG: 81 TABLET, COATED ORAL at 08:19

## 2021-05-26 RX ADMIN — BUMETANIDE 2 MG: 0.25 INJECTION INTRAMUSCULAR; INTRAVENOUS at 08:18

## 2021-05-26 RX ADMIN — ISOSORBIDE MONONITRATE 60 MG: 60 TABLET ORAL at 08:19

## 2021-05-26 RX ADMIN — INSULIN GLARGINE 12 UNITS: 100 INJECTION, SOLUTION SUBCUTANEOUS at 21:20

## 2021-05-27 LAB
ANION GAP SERPL CALCULATED.3IONS-SCNC: 8.5 MMOL/L (ref 5–15)
BUN SERPL-MCNC: 46 MG/DL (ref 8–23)
BUN/CREAT SERPL: 36.5 (ref 7–25)
CALCIUM SPEC-SCNC: 9.1 MG/DL (ref 8.2–9.6)
CHLORIDE SERPL-SCNC: 101 MMOL/L (ref 98–107)
CO2 SERPL-SCNC: 30.5 MMOL/L (ref 22–29)
CREAT SERPL-MCNC: 1.26 MG/DL (ref 0.57–1)
GFR SERPL CREATININE-BSD FRML MDRD: 48 ML/MIN/1.73
GLUCOSE BLDC GLUCOMTR-MCNC: 115 MG/DL (ref 70–130)
GLUCOSE BLDC GLUCOMTR-MCNC: 238 MG/DL (ref 70–130)
GLUCOSE BLDC GLUCOMTR-MCNC: 240 MG/DL (ref 70–130)
GLUCOSE BLDC GLUCOMTR-MCNC: 340 MG/DL (ref 70–130)
GLUCOSE SERPL-MCNC: 124 MG/DL (ref 65–99)
POTASSIUM SERPL-SCNC: 4.4 MMOL/L (ref 3.5–5.2)
SODIUM SERPL-SCNC: 140 MMOL/L (ref 136–145)

## 2021-05-27 PROCEDURE — 82962 GLUCOSE BLOOD TEST: CPT

## 2021-05-27 PROCEDURE — 63710000001 INSULIN LISPRO (HUMAN) PER 5 UNITS: Performed by: HOSPITALIST

## 2021-05-27 PROCEDURE — 63710000001 INSULIN GLARGINE PER 5 UNITS: Performed by: HOSPITALIST

## 2021-05-27 PROCEDURE — 99232 SBSQ HOSP IP/OBS MODERATE 35: CPT | Performed by: INTERNAL MEDICINE

## 2021-05-27 PROCEDURE — 80048 BASIC METABOLIC PNL TOTAL CA: CPT | Performed by: INTERNAL MEDICINE

## 2021-05-27 RX ORDER — ISOSORBIDE MONONITRATE 60 MG/1
120 TABLET, EXTENDED RELEASE ORAL DAILY
Status: DISCONTINUED | OUTPATIENT
Start: 2021-05-28 | End: 2021-05-28 | Stop reason: HOSPADM

## 2021-05-27 RX ORDER — INSULIN GLARGINE 100 [IU]/ML
16 INJECTION, SOLUTION SUBCUTANEOUS NIGHTLY
Status: DISCONTINUED | OUTPATIENT
Start: 2021-05-27 | End: 2021-05-28 | Stop reason: HOSPADM

## 2021-05-27 RX ORDER — ISOSORBIDE MONONITRATE 60 MG/1
60 TABLET, EXTENDED RELEASE ORAL ONCE
Status: COMPLETED | OUTPATIENT
Start: 2021-05-27 | End: 2021-05-27

## 2021-05-27 RX ORDER — TORSEMIDE 20 MG/1
40 TABLET ORAL DAILY
Status: DISCONTINUED | OUTPATIENT
Start: 2021-05-27 | End: 2021-05-28 | Stop reason: HOSPADM

## 2021-05-27 RX ORDER — HYDRALAZINE HYDROCHLORIDE 50 MG/1
100 TABLET, FILM COATED ORAL 3 TIMES DAILY
Status: DISCONTINUED | OUTPATIENT
Start: 2021-05-27 | End: 2021-05-28 | Stop reason: HOSPADM

## 2021-05-27 RX ADMIN — PRAVASTATIN SODIUM 40 MG: 40 TABLET ORAL at 09:56

## 2021-05-27 RX ADMIN — ISOSORBIDE MONONITRATE 60 MG: 60 TABLET ORAL at 14:46

## 2021-05-27 RX ADMIN — DILTIAZEM HYDROCHLORIDE 120 MG: 120 CAPSULE, COATED, EXTENDED RELEASE ORAL at 09:56

## 2021-05-27 RX ADMIN — SODIUM CHLORIDE, PRESERVATIVE FREE 10 ML: 5 INJECTION INTRAVENOUS at 20:56

## 2021-05-27 RX ADMIN — SODIUM CHLORIDE, PRESERVATIVE FREE 10 ML: 5 INJECTION INTRAVENOUS at 09:56

## 2021-05-27 RX ADMIN — ISOSORBIDE MONONITRATE 60 MG: 60 TABLET ORAL at 09:56

## 2021-05-27 RX ADMIN — METOPROLOL SUCCINATE 150 MG: 100 TABLET, EXTENDED RELEASE ORAL at 20:48

## 2021-05-27 RX ADMIN — TORSEMIDE 40 MG: 20 TABLET ORAL at 14:47

## 2021-05-27 RX ADMIN — INSULIN LISPRO 4 UNITS: 100 INJECTION, SOLUTION INTRAVENOUS; SUBCUTANEOUS at 20:47

## 2021-05-27 RX ADMIN — INSULIN LISPRO 3 UNITS: 100 INJECTION, SOLUTION INTRAVENOUS; SUBCUTANEOUS at 17:19

## 2021-05-27 RX ADMIN — HYDRALAZINE HYDROCHLORIDE 100 MG: 50 TABLET, FILM COATED ORAL at 18:22

## 2021-05-27 RX ADMIN — INSULIN LISPRO 5 UNITS: 100 INJECTION, SOLUTION INTRAVENOUS; SUBCUTANEOUS at 12:58

## 2021-05-27 RX ADMIN — BUPROPION HYDROCHLORIDE 75 MG: 75 TABLET, FILM COATED ORAL at 09:56

## 2021-05-27 RX ADMIN — ASPIRIN 81 MG: 81 TABLET, COATED ORAL at 09:56

## 2021-05-27 RX ADMIN — BUPROPION HYDROCHLORIDE 75 MG: 75 TABLET, FILM COATED ORAL at 20:48

## 2021-05-27 RX ADMIN — INSULIN GLARGINE 16 UNITS: 100 INJECTION, SOLUTION SUBCUTANEOUS at 20:48

## 2021-05-27 RX ADMIN — HYDRALAZINE HYDROCHLORIDE 25 MG: 25 TABLET, FILM COATED ORAL at 09:56

## 2021-05-28 ENCOUNTER — NURSE TRIAGE (OUTPATIENT)
Dept: CALL CENTER | Facility: HOSPITAL | Age: 86
End: 2021-05-28

## 2021-05-28 ENCOUNTER — TRANSCRIBE ORDERS (OUTPATIENT)
Dept: HOME HEALTH SERVICES | Facility: HOME HEALTHCARE | Age: 86
End: 2021-05-28

## 2021-05-28 ENCOUNTER — APPOINTMENT (OUTPATIENT)
Dept: GENERAL RADIOLOGY | Facility: HOSPITAL | Age: 86
End: 2021-05-28

## 2021-05-28 VITALS
HEIGHT: 60 IN | SYSTOLIC BLOOD PRESSURE: 140 MMHG | DIASTOLIC BLOOD PRESSURE: 78 MMHG | BODY MASS INDEX: 35.6 KG/M2 | HEART RATE: 73 BPM | OXYGEN SATURATION: 96 % | WEIGHT: 181.3 LBS | RESPIRATION RATE: 20 BRPM | TEMPERATURE: 98.7 F

## 2021-05-28 LAB
ANION GAP SERPL CALCULATED.3IONS-SCNC: 7 MMOL/L (ref 5–15)
BUN SERPL-MCNC: 47 MG/DL (ref 8–23)
BUN/CREAT SERPL: 33.8 (ref 7–25)
CALCIUM SPEC-SCNC: 8.9 MG/DL (ref 8.2–9.6)
CHLORIDE SERPL-SCNC: 101 MMOL/L (ref 98–107)
CO2 SERPL-SCNC: 29 MMOL/L (ref 22–29)
CREAT SERPL-MCNC: 1.39 MG/DL (ref 0.57–1)
GFR SERPL CREATININE-BSD FRML MDRD: 43 ML/MIN/1.73
GLUCOSE BLDC GLUCOMTR-MCNC: 143 MG/DL (ref 70–130)
GLUCOSE BLDC GLUCOMTR-MCNC: 206 MG/DL (ref 70–130)
GLUCOSE BLDC GLUCOMTR-MCNC: 331 MG/DL (ref 70–130)
GLUCOSE SERPL-MCNC: 148 MG/DL (ref 65–99)
POTASSIUM SERPL-SCNC: 4.5 MMOL/L (ref 3.5–5.2)
SODIUM SERPL-SCNC: 137 MMOL/L (ref 136–145)

## 2021-05-28 PROCEDURE — 80048 BASIC METABOLIC PNL TOTAL CA: CPT | Performed by: HOSPITALIST

## 2021-05-28 PROCEDURE — 82962 GLUCOSE BLOOD TEST: CPT

## 2021-05-28 PROCEDURE — 63710000001 INSULIN LISPRO (HUMAN) PER 5 UNITS: Performed by: HOSPITALIST

## 2021-05-28 PROCEDURE — 73630 X-RAY EXAM OF FOOT: CPT

## 2021-05-28 RX ORDER — ISOSORBIDE MONONITRATE 120 MG/1
120 TABLET, EXTENDED RELEASE ORAL DAILY
Qty: 30 TABLET | Refills: 0 | Status: SHIPPED | OUTPATIENT
Start: 2021-05-29 | End: 2021-06-30

## 2021-05-28 RX ORDER — HYDRALAZINE HYDROCHLORIDE 100 MG/1
100 TABLET, FILM COATED ORAL 3 TIMES DAILY
Qty: 90 TABLET | Refills: 0 | Status: ON HOLD | OUTPATIENT
Start: 2021-05-28 | End: 2021-06-08 | Stop reason: SDUPTHER

## 2021-05-28 RX ORDER — TORSEMIDE 20 MG/1
40 TABLET ORAL DAILY
Qty: 60 TABLET | Refills: 0 | Status: SHIPPED | OUTPATIENT
Start: 2021-05-29 | End: 2021-06-08 | Stop reason: HOSPADM

## 2021-05-28 RX ORDER — METOPROLOL SUCCINATE 50 MG/1
150 TABLET, EXTENDED RELEASE ORAL NIGHTLY
Qty: 90 TABLET | Refills: 0 | Status: SHIPPED | OUTPATIENT
Start: 2021-05-28 | End: 2021-06-08 | Stop reason: HOSPADM

## 2021-05-28 RX ADMIN — ISOSORBIDE MONONITRATE 120 MG: 60 TABLET ORAL at 08:36

## 2021-05-28 RX ADMIN — DILTIAZEM HYDROCHLORIDE 120 MG: 120 CAPSULE, COATED, EXTENDED RELEASE ORAL at 08:36

## 2021-05-28 RX ADMIN — ASPIRIN 81 MG: 81 TABLET, COATED ORAL at 08:36

## 2021-05-28 RX ADMIN — SODIUM CHLORIDE, PRESERVATIVE FREE 10 ML: 5 INJECTION INTRAVENOUS at 08:42

## 2021-05-28 RX ADMIN — HYDRALAZINE HYDROCHLORIDE 100 MG: 50 TABLET, FILM COATED ORAL at 00:55

## 2021-05-28 RX ADMIN — INSULIN LISPRO 5 UNITS: 100 INJECTION, SOLUTION INTRAVENOUS; SUBCUTANEOUS at 16:56

## 2021-05-28 RX ADMIN — TORSEMIDE 40 MG: 20 TABLET ORAL at 08:36

## 2021-05-28 RX ADMIN — INSULIN LISPRO 3 UNITS: 100 INJECTION, SOLUTION INTRAVENOUS; SUBCUTANEOUS at 13:50

## 2021-05-28 RX ADMIN — HYDRALAZINE HYDROCHLORIDE 100 MG: 50 TABLET, FILM COATED ORAL at 08:36

## 2021-05-28 RX ADMIN — BUPROPION HYDROCHLORIDE 75 MG: 75 TABLET, FILM COATED ORAL at 08:36

## 2021-05-28 RX ADMIN — HYDRALAZINE HYDROCHLORIDE 100 MG: 50 TABLET, FILM COATED ORAL at 16:56

## 2021-05-28 RX ADMIN — PRAVASTATIN SODIUM 40 MG: 40 TABLET ORAL at 08:36

## 2021-05-29 ENCOUNTER — NURSE TRIAGE (OUTPATIENT)
Dept: CALL CENTER | Facility: HOSPITAL | Age: 86
End: 2021-05-29

## 2021-05-29 ENCOUNTER — READMISSION MANAGEMENT (OUTPATIENT)
Dept: CALL CENTER | Facility: HOSPITAL | Age: 86
End: 2021-05-29

## 2021-05-29 NOTE — TELEPHONE ENCOUNTER
Caller advised Metoprolol succinate XL is a sustained release form while Metoprolol tartrate is the immediate release form. Caller is concerned that the dosage of Metoprolol succinate XL is lower than the Metoprolol tartrate she was taking prior to admission. Advised that other medications had also been changed that will impact her BP. Advised she monitor Mrs. Ivy's BP at least am and pm and keep a record so that she can see how her medication changes are affecting her and so that she has the data to take to her FU appointment. Caller agrees to follow care advice.     Reason for Disposition  • Caller has medication question only, adult not sick, and triager answers question    Additional Information  • Negative: Drug overdose and triager unable to answer question  • Negative: Caller requesting information unrelated to medicine  • Negative: Caller requesting a prescription for Strep throat and has a positive culture result  • Negative: Rash while taking a medication or within 3 days of stopping it  • Negative: Immunization reaction suspected  • Negative: [1] Asthma and [2] having symptoms of asthma (cough, wheezing, etc.)  • Negative: [1] Influenza symptoms AND [2] anti-viral med prescription request, such as Tamiflu  • Negative: [1] Symptom of illness (e.g., headache, abdominal pain, earache, vomiting) AND [2] more than mild  • Negative: MORE THAN A DOUBLE DOSE of a prescription or over-the-counter (OTC) drug  • Negative: [1] DOUBLE DOSE (an extra dose or lesser amount) of over-the-counter (OTC) drug AND [2] any symptoms (e.g., dizziness, nausea, pain, sleepiness)  • Negative: [1] DOUBLE DOSE (an extra dose or lesser amount) of prescription drug AND [2] any symptoms (e.g., dizziness, nausea, pain, sleepiness)  • Negative: Took another person's prescription drug  • Negative: [1] DOUBLE DOSE (an extra dose or lesser amount) of prescription drug AND [2] NO symptoms (Exception: a double dose of antibiotics)  •  "Negative: Diabetes drug error or overdose (e.g., took wrong type of insulin or took extra dose)  • Negative: [1] Request for URGENT new prescription or refill of \"essential\" medication (i.e., likelihood of harm to patient if not taken) AND [2] triager unable to fill per unit policy  • Negative: [1] Prescription not at pharmacy AND [2] was prescribed by PCP recently  • Negative: [1] Pharmacy calling with prescription questions AND [2] triager unable to answer question  • Negative: [1] Caller has URGENT medication question about med that PCP or specialist prescribed AND [2] triager unable to answer question  • Negative: [1] Caller has NON-URGENT medication question about med that PCP prescribed AND [2] triager unable to answer question  • Negative: [1] Caller requesting a NON-URGENT new prescription or refill AND [2] triager unable to refill per unit policy  • Negative: [1] Caller has medication question about med not prescribed by PCP AND [2] triager unable to answer question (e.g., compatibility with other med, storage)  • Negative: Caller requesting a CONTROLLED substance prescription refill (e.g., narcotics, ADHD medicines)  • Negative: Caller wants to use a complementary or alternative medicine  • Negative: [1] Prescription prescribed recently is not at pharmacy AND [2] triager has access to patient's EMR AND [3] prescription is recorded in the EMR  • Negative: [1] DOUBLE DOSE (an extra dose or lesser amount) of over-the-counter (OTC) drug AND [2] NO symptoms  • Negative: [1] DOUBLE DOSE (an extra dose or lesser amount) of antibiotic drug AND [2] NO symptoms    Answer Assessment - Initial Assessment Questions  1.   NAME of MEDICATION: \"What medicine are you calling about?\"      Metoprolol Succinate XL  2.   QUESTION: \"What is your question?\"      How is this different from Metoprolol Tartrate  3.   PRESCRIBING HCP: \"Who prescribed it?\" Reason: if prescribed by specialist, call should be referred to that group.    " "  Dr. Vázquez  4. SYMPTOMS: \"Do you have any symptoms?\"      na  5. SEVERITY: If symptoms are present, ask \"Are they mild, moderate or severe?\"      na  6.  PREGNANCY:  \"Is there any chance that you are pregnant?\" \"When was your last menstrual period?\"      na    Protocols used: MEDICATION QUESTION CALL-ADULT-      "

## 2021-05-29 NOTE — TELEPHONE ENCOUNTER
Caller states that she did not notice on her grandmother's dc summary that the diltiazem dose to continue and what her home dose were not the same.  Epic records reviewed and noticed that the home dose of diltiazem was noted as 120 mg daily where it is actually 300 mg once daily.  She was on 120 mg daily in the hosp as they thought this was her home dose.  Caller is asking what dose she should actually be on?  If it is 120 mg daily she will need a prescription sent in.  Routed to case management.    Reason for Disposition  • [1] Caller has URGENT medication question about med that PCP or specialist prescribed AND [2] triager unable to answer question    Additional Information  • Negative: Drug overdose and triager unable to answer question  • Negative: Caller requesting information unrelated to medicine  • Negative: Caller requesting a prescription for Strep throat and has a positive culture result  • Negative: Rash while taking a medication or within 3 days of stopping it  • Negative: Immunization reaction suspected  • Negative: [1] Asthma and [2] having symptoms of asthma (cough, wheezing, etc.)  • Negative: [1] Influenza symptoms AND [2] anti-viral med prescription request, such as Tamiflu  • Negative: [1] Symptom of illness (e.g., headache, abdominal pain, earache, vomiting) AND [2] more than mild  • Negative: MORE THAN A DOUBLE DOSE of a prescription or over-the-counter (OTC) drug  • Negative: [1] DOUBLE DOSE (an extra dose or lesser amount) of over-the-counter (OTC) drug AND [2] any symptoms (e.g., dizziness, nausea, pain, sleepiness)  • Negative: [1] DOUBLE DOSE (an extra dose or lesser amount) of prescription drug AND [2] any symptoms (e.g., dizziness, nausea, pain, sleepiness)  • Negative: Took another person's prescription drug  • Negative: [1] DOUBLE DOSE (an extra dose or lesser amount) of prescription drug AND [2] NO symptoms (Exception: a double dose of antibiotics)  • Negative: Diabetes drug error or  "overdose (e.g., took wrong type of insulin or took extra dose)  • Negative: [1] Request for URGENT new prescription or refill of \"essential\" medication (i.e., likelihood of harm to patient if not taken) AND [2] triager unable to fill per unit policy  • Negative: [1] Prescription not at pharmacy AND [2] was prescribed by PCP recently  • Negative: [1] Pharmacy calling with prescription questions AND [2] triager unable to answer question    Answer Assessment - Initial Assessment Questions  1.   NAME of MEDICATION: \"What medicine are you calling about?\"      diltiazem  2.   QUESTION: \"What is your question?\"      Her home dose and the dose on her dc summary are not the same  3.   PRESCRIBING HCP: \"Who prescribed it?\" Reason: if prescribed by specialist, call should be referred to that group.      hospitalist  4. SYMPTOMS: \"Do you have any symptoms?\"      yes  5. SEVERITY: If symptoms are present, ask \"Are they mild, moderate or severe?\"      mild  6.  PREGNANCY:  \"Is there any chance that you are pregnant?\" \"When was your last menstrual period?\"      na    Protocols used: MEDICATION QUESTION CALL-ADULT-AH      "

## 2021-05-29 NOTE — OUTREACH NOTE
Prep Survey      Responses   Religion facility patient discharged from?  [place]   Is LACE score < 7 ?  No   Emergency Room discharge w/ pulse ox?  No   Eligibility  Readm Mgmt   Discharge diagnosis  **Acute on chronic congestive heart failure    Does the patient have one of the following disease processes/diagnoses(primary or secondary)?  CHF   Does the patient have Home health ordered?  Yes   What is the Home health agency?   [facility]    Is there a DME ordered?  No   Prep survey completed?  Yes          Kanwal Dupont RN

## 2021-05-29 NOTE — TELEPHONE ENCOUNTER
"  Reason for Disposition  • [1] Caller requesting NON-URGENT health information AND [2] PCP's office is the best resource    Additional Information  • Negative: [1] Caller is not with the adult (patient) AND [2] reporting urgent symptoms  • Negative: Lab result questions  • Negative: Medication questions  • Negative: Caller can't be reached by phone  • Negative: Caller has already spoken to PCP or another triager  • Negative: RN needs further essential information from caller in order to complete triage  • Negative: Requesting regular office appointment    Answer Assessment - Initial Assessment Questions  1. REASON FOR CALL or QUESTION: \"What is your reason for calling today?\" or \"How can I best help you?\" or \"What question do you have that I can help answer?\"      Granddaughter is calling again about medications. List reviewed from AVS and ITN. She is concerned with diltiazem dose. Diltiazem was entered into Epic from home as 120mg Daily. AVS shows to continue home medication of diltiazem 120mg daily. Once home, they realized she is on 300mg daily. They want to know if it is ok to continue this or is she to be on lower dose. Explained that previous nurse had reached out to case management and that they would respond, however unsure of when as it is a weekend. Encouraged granddaughter to contact PCP to discuss at this time.    Protocols used: INFORMATION ONLY CALL - NO TRIAGE-ADULT-    "

## 2021-05-30 ENCOUNTER — HOSPITAL ENCOUNTER (INPATIENT)
Facility: HOSPITAL | Age: 86
LOS: 8 days | Discharge: HOME HEALTH | End: 2021-06-08
Attending: EMERGENCY MEDICINE | Admitting: HOSPITALIST

## 2021-05-30 ENCOUNTER — NURSE TRIAGE (OUTPATIENT)
Dept: CALL CENTER | Facility: HOSPITAL | Age: 86
End: 2021-05-30

## 2021-05-30 DIAGNOSIS — I50.32 CHRONIC DIASTOLIC CHF (CONGESTIVE HEART FAILURE) (HCC): ICD-10-CM

## 2021-05-30 DIAGNOSIS — N18.30 STAGE 3 CHRONIC KIDNEY DISEASE, UNSPECIFIED WHETHER STAGE 3A OR 3B CKD (HCC): ICD-10-CM

## 2021-05-30 DIAGNOSIS — N17.9 AKI (ACUTE KIDNEY INJURY) (HCC): ICD-10-CM

## 2021-05-30 DIAGNOSIS — N17.9 ACUTE RENAL FAILURE, UNSPECIFIED ACUTE RENAL FAILURE TYPE (HCC): ICD-10-CM

## 2021-05-30 DIAGNOSIS — R63.4 WEIGHT LOSS: ICD-10-CM

## 2021-05-30 DIAGNOSIS — R53.1 GENERALIZED WEAKNESS: Primary | ICD-10-CM

## 2021-05-30 DIAGNOSIS — E87.5 HYPERKALEMIA: ICD-10-CM

## 2021-05-30 LAB
ALBUMIN SERPL-MCNC: 3.7 G/DL (ref 3.5–5.2)
ALBUMIN/GLOB SERPL: 1.2 G/DL
ALP SERPL-CCNC: 55 U/L (ref 39–117)
ALT SERPL W P-5'-P-CCNC: 28 U/L (ref 1–33)
ANION GAP SERPL CALCULATED.3IONS-SCNC: 10.1 MMOL/L (ref 5–15)
AST SERPL-CCNC: 26 U/L (ref 1–32)
BASOPHILS # BLD AUTO: 0.02 10*3/MM3 (ref 0–0.2)
BASOPHILS NFR BLD AUTO: 0.3 % (ref 0–1.5)
BILIRUB SERPL-MCNC: 0.3 MG/DL (ref 0–1.2)
BILIRUB UR QL STRIP: NEGATIVE
BUN SERPL-MCNC: 62 MG/DL (ref 8–23)
BUN/CREAT SERPL: 35.2 (ref 7–25)
CALCIUM SPEC-SCNC: 9.6 MG/DL (ref 8.2–9.6)
CHLORIDE SERPL-SCNC: 101 MMOL/L (ref 98–107)
CLARITY UR: CLEAR
CO2 SERPL-SCNC: 27.9 MMOL/L (ref 22–29)
COLOR UR: YELLOW
CREAT SERPL-MCNC: 1.76 MG/DL (ref 0.57–1)
DEPRECATED RDW RBC AUTO: 43.9 FL (ref 37–54)
EOSINOPHIL # BLD AUTO: 0.12 10*3/MM3 (ref 0–0.4)
EOSINOPHIL NFR BLD AUTO: 1.7 % (ref 0.3–6.2)
ERYTHROCYTE [DISTWIDTH] IN BLOOD BY AUTOMATED COUNT: 13 % (ref 12.3–15.4)
GFR SERPL CREATININE-BSD FRML MDRD: 33 ML/MIN/1.73
GLOBULIN UR ELPH-MCNC: 3.2 GM/DL
GLUCOSE BLDC GLUCOMTR-MCNC: 124 MG/DL (ref 70–130)
GLUCOSE SERPL-MCNC: 116 MG/DL (ref 65–99)
GLUCOSE UR STRIP-MCNC: NEGATIVE MG/DL
HCT VFR BLD AUTO: 33.3 % (ref 34–46.6)
HGB BLD-MCNC: 10.7 G/DL (ref 12–15.9)
HGB UR QL STRIP.AUTO: NEGATIVE
HOLD SPECIMEN: NORMAL
HOLD SPECIMEN: NORMAL
IMM GRANULOCYTES # BLD AUTO: 0.02 10*3/MM3 (ref 0–0.05)
IMM GRANULOCYTES NFR BLD AUTO: 0.3 % (ref 0–0.5)
KETONES UR QL STRIP: NEGATIVE
LEUKOCYTE ESTERASE UR QL STRIP.AUTO: NEGATIVE
LYMPHOCYTES # BLD AUTO: 0.82 10*3/MM3 (ref 0.7–3.1)
LYMPHOCYTES NFR BLD AUTO: 11.8 % (ref 19.6–45.3)
MCH RBC QN AUTO: 30.1 PG (ref 26.6–33)
MCHC RBC AUTO-ENTMCNC: 32.1 G/DL (ref 31.5–35.7)
MCV RBC AUTO: 93.5 FL (ref 79–97)
MONOCYTES # BLD AUTO: 0.65 10*3/MM3 (ref 0.1–0.9)
MONOCYTES NFR BLD AUTO: 9.4 % (ref 5–12)
NEUTROPHILS NFR BLD AUTO: 5.32 10*3/MM3 (ref 1.7–7)
NEUTROPHILS NFR BLD AUTO: 76.5 % (ref 42.7–76)
NITRITE UR QL STRIP: NEGATIVE
NRBC BLD AUTO-RTO: 0 /100 WBC (ref 0–0.2)
PH UR STRIP.AUTO: 6 [PH] (ref 5–8)
PLATELET # BLD AUTO: 203 10*3/MM3 (ref 140–450)
PMV BLD AUTO: 10.6 FL (ref 6–12)
POTASSIUM SERPL-SCNC: 5.6 MMOL/L (ref 3.5–5.2)
PROT SERPL-MCNC: 6.9 G/DL (ref 6–8.5)
PROT UR QL STRIP: ABNORMAL
RBC # BLD AUTO: 3.56 10*6/MM3 (ref 3.77–5.28)
SODIUM SERPL-SCNC: 139 MMOL/L (ref 136–145)
SP GR UR STRIP: 1.01 (ref 1–1.03)
UROBILINOGEN UR QL STRIP: ABNORMAL
WBC # BLD AUTO: 6.95 10*3/MM3 (ref 3.4–10.8)
WHOLE BLOOD HOLD SPECIMEN: NORMAL
WHOLE BLOOD HOLD SPECIMEN: NORMAL

## 2021-05-30 PROCEDURE — 85025 COMPLETE CBC W/AUTO DIFF WBC: CPT | Performed by: EMERGENCY MEDICINE

## 2021-05-30 PROCEDURE — G0378 HOSPITAL OBSERVATION PER HR: HCPCS

## 2021-05-30 PROCEDURE — 82962 GLUCOSE BLOOD TEST: CPT

## 2021-05-30 PROCEDURE — 81003 URINALYSIS AUTO W/O SCOPE: CPT | Performed by: EMERGENCY MEDICINE

## 2021-05-30 PROCEDURE — 99284 EMERGENCY DEPT VISIT MOD MDM: CPT

## 2021-05-30 PROCEDURE — U0004 COV-19 TEST NON-CDC HGH THRU: HCPCS | Performed by: EMERGENCY MEDICINE

## 2021-05-30 PROCEDURE — U0005 INFEC AGEN DETEC AMPLI PROBE: HCPCS | Performed by: EMERGENCY MEDICINE

## 2021-05-30 PROCEDURE — 80053 COMPREHEN METABOLIC PANEL: CPT | Performed by: EMERGENCY MEDICINE

## 2021-05-30 RX ORDER — SODIUM CHLORIDE 9 MG/ML
125 INJECTION, SOLUTION INTRAVENOUS CONTINUOUS
Status: DISCONTINUED | OUTPATIENT
Start: 2021-05-30 | End: 2021-05-31

## 2021-05-30 RX ORDER — SODIUM CHLORIDE 0.9 % (FLUSH) 0.9 %
10 SYRINGE (ML) INJECTION AS NEEDED
Status: DISCONTINUED | OUTPATIENT
Start: 2021-05-30 | End: 2021-06-08 | Stop reason: HOSPADM

## 2021-05-30 RX ADMIN — SODIUM CHLORIDE 500 ML: 9 INJECTION, SOLUTION INTRAVENOUS at 22:32

## 2021-05-30 RX ADMIN — SODIUM CHLORIDE 125 ML/HR: 9 INJECTION, SOLUTION INTRAVENOUS at 22:32

## 2021-05-31 ENCOUNTER — READMISSION MANAGEMENT (OUTPATIENT)
Dept: CALL CENTER | Facility: HOSPITAL | Age: 86
End: 2021-05-31

## 2021-05-31 PROBLEM — I95.9 HYPOTENSION: Status: ACTIVE | Noted: 2021-05-31

## 2021-05-31 PROBLEM — I50.32 CHRONIC DIASTOLIC CHF (CONGESTIVE HEART FAILURE) (HCC): Chronic | Status: ACTIVE | Noted: 2021-05-31

## 2021-05-31 PROBLEM — E87.5 HYPERKALEMIA: Status: ACTIVE | Noted: 2021-05-31

## 2021-05-31 PROBLEM — R63.4 WEIGHT LOSS: Status: ACTIVE | Noted: 2021-05-31

## 2021-05-31 PROBLEM — D64.9 ANEMIA: Status: ACTIVE | Noted: 2021-05-31

## 2021-05-31 LAB
ANION GAP SERPL CALCULATED.3IONS-SCNC: 9.2 MMOL/L (ref 5–15)
BUN SERPL-MCNC: 55 MG/DL (ref 8–23)
BUN/CREAT SERPL: 36.9 (ref 7–25)
CALCIUM SPEC-SCNC: 8.9 MG/DL (ref 8.2–9.6)
CHLORIDE SERPL-SCNC: 105 MMOL/L (ref 98–107)
CO2 SERPL-SCNC: 27.8 MMOL/L (ref 22–29)
CREAT SERPL-MCNC: 1.49 MG/DL (ref 0.57–1)
DEPRECATED RDW RBC AUTO: 45.5 FL (ref 37–54)
ERYTHROCYTE [DISTWIDTH] IN BLOOD BY AUTOMATED COUNT: 13.1 % (ref 12.3–15.4)
GFR SERPL CREATININE-BSD FRML MDRD: 40 ML/MIN/1.73
GLUCOSE BLDC GLUCOMTR-MCNC: 113 MG/DL (ref 70–130)
GLUCOSE BLDC GLUCOMTR-MCNC: 166 MG/DL (ref 70–130)
GLUCOSE BLDC GLUCOMTR-MCNC: 167 MG/DL (ref 70–130)
GLUCOSE BLDC GLUCOMTR-MCNC: 219 MG/DL (ref 70–130)
GLUCOSE SERPL-MCNC: 105 MG/DL (ref 65–99)
HBA1C MFR BLD: 6.36 % (ref 4.8–5.6)
HCT VFR BLD AUTO: 32.2 % (ref 34–46.6)
HGB BLD-MCNC: 10.3 G/DL (ref 12–15.9)
MCH RBC QN AUTO: 30.7 PG (ref 26.6–33)
MCHC RBC AUTO-ENTMCNC: 32 G/DL (ref 31.5–35.7)
MCV RBC AUTO: 95.8 FL (ref 79–97)
PLATELET # BLD AUTO: 171 10*3/MM3 (ref 140–450)
PMV BLD AUTO: 10.2 FL (ref 6–12)
POTASSIUM SERPL-SCNC: 4 MMOL/L (ref 3.5–5.2)
RBC # BLD AUTO: 3.36 10*6/MM3 (ref 3.77–5.28)
SARS-COV-2 ORF1AB RESP QL NAA+PROBE: NOT DETECTED
SODIUM SERPL-SCNC: 142 MMOL/L (ref 136–145)
WBC # BLD AUTO: 5.72 10*3/MM3 (ref 3.4–10.8)

## 2021-05-31 PROCEDURE — 63710000001 INSULIN LISPRO (HUMAN) PER 5 UNITS: Performed by: NURSE PRACTITIONER

## 2021-05-31 PROCEDURE — 63710000001 INSULIN GLARGINE PER 5 UNITS: Performed by: NURSE PRACTITIONER

## 2021-05-31 PROCEDURE — 80048 BASIC METABOLIC PNL TOTAL CA: CPT | Performed by: NURSE PRACTITIONER

## 2021-05-31 PROCEDURE — 94799 UNLISTED PULMONARY SVC/PX: CPT

## 2021-05-31 PROCEDURE — 97110 THERAPEUTIC EXERCISES: CPT

## 2021-05-31 PROCEDURE — 99221 1ST HOSP IP/OBS SF/LOW 40: CPT | Performed by: INTERNAL MEDICINE

## 2021-05-31 PROCEDURE — 97161 PT EVAL LOW COMPLEX 20 MIN: CPT

## 2021-05-31 PROCEDURE — 83036 HEMOGLOBIN GLYCOSYLATED A1C: CPT | Performed by: NURSE PRACTITIONER

## 2021-05-31 PROCEDURE — 85027 COMPLETE CBC AUTOMATED: CPT | Performed by: NURSE PRACTITIONER

## 2021-05-31 PROCEDURE — 82962 GLUCOSE BLOOD TEST: CPT

## 2021-05-31 PROCEDURE — 94640 AIRWAY INHALATION TREATMENT: CPT

## 2021-05-31 RX ORDER — ONDANSETRON 2 MG/ML
4 INJECTION INTRAMUSCULAR; INTRAVENOUS EVERY 6 HOURS PRN
Status: DISCONTINUED | OUTPATIENT
Start: 2021-05-31 | End: 2021-06-08 | Stop reason: HOSPADM

## 2021-05-31 RX ORDER — ISOSORBIDE MONONITRATE 60 MG/1
120 TABLET, EXTENDED RELEASE ORAL DAILY
Status: DISCONTINUED | OUTPATIENT
Start: 2021-05-31 | End: 2021-06-08 | Stop reason: HOSPADM

## 2021-05-31 RX ORDER — INSULIN LISPRO 100 [IU]/ML
0-14 INJECTION, SOLUTION INTRAVENOUS; SUBCUTANEOUS
Status: DISCONTINUED | OUTPATIENT
Start: 2021-05-31 | End: 2021-06-08 | Stop reason: HOSPADM

## 2021-05-31 RX ORDER — ACETAMINOPHEN 650 MG/1
650 SUPPOSITORY RECTAL EVERY 4 HOURS PRN
Status: DISCONTINUED | OUTPATIENT
Start: 2021-05-31 | End: 2021-06-08 | Stop reason: HOSPADM

## 2021-05-31 RX ORDER — SODIUM CHLORIDE 9 MG/ML
75 INJECTION, SOLUTION INTRAVENOUS CONTINUOUS
Status: DISCONTINUED | OUTPATIENT
Start: 2021-05-31 | End: 2021-06-01

## 2021-05-31 RX ORDER — ACETAMINOPHEN 160 MG/5ML
650 SOLUTION ORAL EVERY 4 HOURS PRN
Status: DISCONTINUED | OUTPATIENT
Start: 2021-05-31 | End: 2021-06-08 | Stop reason: HOSPADM

## 2021-05-31 RX ORDER — PRAVASTATIN SODIUM 40 MG
40 TABLET ORAL DAILY
Status: DISCONTINUED | OUTPATIENT
Start: 2021-05-31 | End: 2021-06-03

## 2021-05-31 RX ORDER — SODIUM CHLORIDE 0.9 % (FLUSH) 0.9 %
10 SYRINGE (ML) INJECTION EVERY 12 HOURS SCHEDULED
Status: DISCONTINUED | OUTPATIENT
Start: 2021-05-31 | End: 2021-06-08 | Stop reason: HOSPADM

## 2021-05-31 RX ORDER — INSULIN GLARGINE 100 [IU]/ML
10 INJECTION, SOLUTION SUBCUTANEOUS NIGHTLY
Status: DISCONTINUED | OUTPATIENT
Start: 2021-05-31 | End: 2021-06-08 | Stop reason: HOSPADM

## 2021-05-31 RX ORDER — BUPROPION HYDROCHLORIDE 75 MG/1
75 TABLET ORAL EVERY 12 HOURS SCHEDULED
Status: DISCONTINUED | OUTPATIENT
Start: 2021-05-31 | End: 2021-06-08 | Stop reason: HOSPADM

## 2021-05-31 RX ORDER — SODIUM CHLORIDE 0.9 % (FLUSH) 0.9 %
10 SYRINGE (ML) INJECTION AS NEEDED
Status: DISCONTINUED | OUTPATIENT
Start: 2021-05-31 | End: 2021-06-08 | Stop reason: HOSPADM

## 2021-05-31 RX ORDER — NICOTINE POLACRILEX 4 MG
15 LOZENGE BUCCAL
Status: DISCONTINUED | OUTPATIENT
Start: 2021-05-31 | End: 2021-06-08 | Stop reason: HOSPADM

## 2021-05-31 RX ORDER — ASPIRIN 81 MG/1
81 TABLET ORAL DAILY
Status: DISCONTINUED | OUTPATIENT
Start: 2021-05-31 | End: 2021-06-03

## 2021-05-31 RX ORDER — NITROGLYCERIN 0.4 MG/1
0.4 TABLET SUBLINGUAL
Status: DISCONTINUED | OUTPATIENT
Start: 2021-05-31 | End: 2021-06-08 | Stop reason: HOSPADM

## 2021-05-31 RX ORDER — ACETAMINOPHEN 325 MG/1
650 TABLET ORAL EVERY 4 HOURS PRN
Status: DISCONTINUED | OUTPATIENT
Start: 2021-05-31 | End: 2021-06-08 | Stop reason: HOSPADM

## 2021-05-31 RX ORDER — DEXTROSE MONOHYDRATE 25 G/50ML
25 INJECTION, SOLUTION INTRAVENOUS
Status: DISCONTINUED | OUTPATIENT
Start: 2021-05-31 | End: 2021-06-08 | Stop reason: HOSPADM

## 2021-05-31 RX ADMIN — BUPROPION HYDROCHLORIDE 75 MG: 75 TABLET, FILM COATED ORAL at 09:13

## 2021-05-31 RX ADMIN — ISOSORBIDE MONONITRATE 120 MG: 60 TABLET ORAL at 09:13

## 2021-05-31 RX ADMIN — SODIUM CHLORIDE 100 ML/HR: 9 INJECTION, SOLUTION INTRAVENOUS at 03:52

## 2021-05-31 RX ADMIN — BUPROPION HYDROCHLORIDE 75 MG: 75 TABLET, FILM COATED ORAL at 21:13

## 2021-05-31 RX ADMIN — SODIUM CHLORIDE 100 ML/HR: 9 INJECTION, SOLUTION INTRAVENOUS at 01:23

## 2021-05-31 RX ADMIN — SODIUM CHLORIDE, PRESERVATIVE FREE 10 ML: 5 INJECTION INTRAVENOUS at 09:13

## 2021-05-31 RX ADMIN — SODIUM CHLORIDE, PRESERVATIVE FREE 10 ML: 5 INJECTION INTRAVENOUS at 21:16

## 2021-05-31 RX ADMIN — SODIUM CHLORIDE, PRESERVATIVE FREE 10 ML: 5 INJECTION INTRAVENOUS at 01:23

## 2021-05-31 RX ADMIN — METOPROLOL SUCCINATE 150 MG: 100 TABLET, EXTENDED RELEASE ORAL at 21:13

## 2021-05-31 RX ADMIN — ASPIRIN 81 MG: 81 TABLET, COATED ORAL at 09:13

## 2021-05-31 RX ADMIN — PRAVASTATIN SODIUM 40 MG: 40 TABLET ORAL at 09:13

## 2021-05-31 RX ADMIN — INSULIN GLARGINE 10 UNITS: 100 INJECTION, SOLUTION SUBCUTANEOUS at 21:14

## 2021-05-31 RX ADMIN — IPRATROPIUM BROMIDE 0.5 MG: 0.5 SOLUTION RESPIRATORY (INHALATION) at 22:13

## 2021-05-31 RX ADMIN — INSULIN LISPRO 5 UNITS: 100 INJECTION, SOLUTION INTRAVENOUS; SUBCUTANEOUS at 17:21

## 2021-05-31 NOTE — TELEPHONE ENCOUNTER
"Spoke with caller yesterday about her grandmother's discharge meds.  Her home dose of diltiazem was incorrect in epic and she had some med changes.  Message routed to case management for clarification of medication and need for prescription is the MD wanted diltiazem 120mg instead of 300 mg her usual home dose.  Caller states that she has not heard back from case management.  She states that her bp has been running 170's/80's and today is 122/53 with a HR of 66.  She states that her grandmother is not as responsive as normal.  Instructed her to take patient to the ER for evaluation.  Reason for Disposition  • [1] Fall in systolic BP > 20 mm Hg from normal AND [2] dizzy, lightheaded, or weak    Additional Information  • Negative: Started suddenly after an allergic medicine, an allergic food, or bee sting  • Negative: Shock suspected (e.g., cold/pale/clammy skin, too weak to stand, low BP, rapid pulse)  • Negative: Difficult to awaken or acting confused (e.g., disoriented, slurred speech)  • Negative: Fainted  • Negative: [1] Systolic BP < 90 AND [2] dizzy, lightheaded, or weak  • Negative: Chest pain  • Negative: Bleeding (e.g., vomiting blood, rectal bleeding or tarry stools, severe vaginal bleeding)(Exception: fainted from sight of small amount of blood; small cut or abrasion)  • Negative: Extra heart beats or heart is beating fast  (i.e., \"palpitations\")  • Negative: Sounds like a life-threatening emergency to the triager  • Negative: [1] Systolic BP < 80 AND [2] NOT dizzy, lightheaded or weak  • Negative: Abdominal pain  • Negative: Fever > 100.4 F (38.0 C)  • Negative: Major surgery in the past month  • Negative: [1] Drinking very little AND [2] dehydration suspected (e.g., no urine > 12 hours, very dry mouth, very lightheaded)    Answer Assessment - Initial Assessment Questions  1. BLOOD PRESSURE: \"What is the blood pressure?\" \"Did you take at least two measurements 5 minutes apart?\"      122/53  2. ONSET: " "\"When did you take your blood pressure?\"      A few minutes ago  3. HOW: \"How did you obtain the blood pressure?\" (e.g., visiting nurse, automatic home BP monitor)      Home monitor  4. HISTORY: \"Do you have a history of low blood pressure?\" \"What is your blood pressure normally?\"      No usually high  5. MEDICATIONS: \"Are you taking any medications for blood pressure?\" If yes: \"Have they been changed recently?\"      Yes and yes have been changed  6. PULSE RATE: \"Do you know what your pulse rate is?\"        66  7. OTHER SYMPTOMS: \"Have you been sick recently?\" \"Have you had a recent injury?\"      Recent admission   8. PREGNANCY: \"Is there any chance you are pregnant?\" \"When was your last menstrual period?\"      na    Protocols used: LOW BLOOD PRESSURE-ADULT-AH    "

## 2021-06-01 LAB
25(OH)D3 SERPL-MCNC: 29 NG/ML (ref 30–100)
ALBUMIN SERPL-MCNC: 3.1 G/DL (ref 3.5–5.2)
ALBUMIN/GLOB SERPL: 1.1 G/DL
ALP SERPL-CCNC: 46 U/L (ref 39–117)
ALT SERPL W P-5'-P-CCNC: 20 U/L (ref 1–33)
ANION GAP SERPL CALCULATED.3IONS-SCNC: 3.5 MMOL/L (ref 5–15)
AST SERPL-CCNC: 12 U/L (ref 1–32)
BASOPHILS # BLD AUTO: 0.02 10*3/MM3 (ref 0–0.2)
BASOPHILS NFR BLD AUTO: 0.5 % (ref 0–1.5)
BILIRUB SERPL-MCNC: 0.3 MG/DL (ref 0–1.2)
BUN SERPL-MCNC: 36 MG/DL (ref 8–23)
BUN/CREAT SERPL: 34.6 (ref 7–25)
CALCIUM SPEC-SCNC: 8.8 MG/DL (ref 8.2–9.6)
CHLORIDE SERPL-SCNC: 108 MMOL/L (ref 98–107)
CO2 SERPL-SCNC: 28.5 MMOL/L (ref 22–29)
CREAT SERPL-MCNC: 1.04 MG/DL (ref 0.57–1)
DEPRECATED RDW RBC AUTO: 43.7 FL (ref 37–54)
EOSINOPHIL # BLD AUTO: 0.15 10*3/MM3 (ref 0–0.4)
EOSINOPHIL NFR BLD AUTO: 3.6 % (ref 0.3–6.2)
ERYTHROCYTE [DISTWIDTH] IN BLOOD BY AUTOMATED COUNT: 12.8 % (ref 12.3–15.4)
FERRITIN SERPL-MCNC: 64.2 NG/ML (ref 13–150)
FOLATE SERPL-MCNC: >20 NG/ML (ref 4.78–24.2)
GFR SERPL CREATININE-BSD FRML MDRD: 60 ML/MIN/1.73
GLOBULIN UR ELPH-MCNC: 2.7 GM/DL
GLUCOSE BLDC GLUCOMTR-MCNC: 117 MG/DL (ref 70–130)
GLUCOSE BLDC GLUCOMTR-MCNC: 144 MG/DL (ref 70–130)
GLUCOSE BLDC GLUCOMTR-MCNC: 246 MG/DL (ref 70–130)
GLUCOSE BLDC GLUCOMTR-MCNC: 283 MG/DL (ref 70–130)
GLUCOSE SERPL-MCNC: 119 MG/DL (ref 65–99)
HCT VFR BLD AUTO: 29.4 % (ref 34–46.6)
HGB BLD-MCNC: 9.6 G/DL (ref 12–15.9)
IMM GRANULOCYTES # BLD AUTO: 0.02 10*3/MM3 (ref 0–0.05)
IMM GRANULOCYTES NFR BLD AUTO: 0.5 % (ref 0–0.5)
IRON 24H UR-MRATE: 79 MCG/DL (ref 37–145)
IRON SATN MFR SERPL: 27 % (ref 20–50)
LYMPHOCYTES # BLD AUTO: 0.8 10*3/MM3 (ref 0.7–3.1)
LYMPHOCYTES NFR BLD AUTO: 19 % (ref 19.6–45.3)
MAGNESIUM SERPL-MCNC: 2 MG/DL (ref 1.7–2.3)
MCH RBC QN AUTO: 30.6 PG (ref 26.6–33)
MCHC RBC AUTO-ENTMCNC: 32.7 G/DL (ref 31.5–35.7)
MCV RBC AUTO: 93.6 FL (ref 79–97)
MONOCYTES # BLD AUTO: 0.57 10*3/MM3 (ref 0.1–0.9)
MONOCYTES NFR BLD AUTO: 13.5 % (ref 5–12)
NEUTROPHILS NFR BLD AUTO: 2.66 10*3/MM3 (ref 1.7–7)
NEUTROPHILS NFR BLD AUTO: 62.9 % (ref 42.7–76)
NRBC BLD AUTO-RTO: 0 /100 WBC (ref 0–0.2)
PLATELET # BLD AUTO: 162 10*3/MM3 (ref 140–450)
PMV BLD AUTO: 10.4 FL (ref 6–12)
POTASSIUM SERPL-SCNC: 4 MMOL/L (ref 3.5–5.2)
PROT SERPL-MCNC: 5.8 G/DL (ref 6–8.5)
RBC # BLD AUTO: 3.14 10*6/MM3 (ref 3.77–5.28)
SODIUM SERPL-SCNC: 140 MMOL/L (ref 136–145)
TIBC SERPL-MCNC: 289 MCG/DL (ref 298–536)
TRANSFERRIN SERPL-MCNC: 194 MG/DL (ref 200–360)
TSH SERPL DL<=0.05 MIU/L-ACNC: 2.2 UIU/ML (ref 0.27–4.2)
VIT B12 BLD-MCNC: 1031 PG/ML (ref 211–946)
WBC # BLD AUTO: 4.22 10*3/MM3 (ref 3.4–10.8)

## 2021-06-01 PROCEDURE — 82962 GLUCOSE BLOOD TEST: CPT

## 2021-06-01 PROCEDURE — 25010000002 ENOXAPARIN PER 10 MG: Performed by: INTERNAL MEDICINE

## 2021-06-01 PROCEDURE — 82746 ASSAY OF FOLIC ACID SERUM: CPT | Performed by: HOSPITALIST

## 2021-06-01 PROCEDURE — 85025 COMPLETE CBC W/AUTO DIFF WBC: CPT | Performed by: HOSPITALIST

## 2021-06-01 PROCEDURE — 84443 ASSAY THYROID STIM HORMONE: CPT | Performed by: HOSPITALIST

## 2021-06-01 PROCEDURE — 97110 THERAPEUTIC EXERCISES: CPT

## 2021-06-01 PROCEDURE — 82306 VITAMIN D 25 HYDROXY: CPT | Performed by: HOSPITALIST

## 2021-06-01 PROCEDURE — 82607 VITAMIN B-12: CPT | Performed by: HOSPITALIST

## 2021-06-01 PROCEDURE — 82728 ASSAY OF FERRITIN: CPT | Performed by: HOSPITALIST

## 2021-06-01 PROCEDURE — 99232 SBSQ HOSP IP/OBS MODERATE 35: CPT | Performed by: NURSE PRACTITIONER

## 2021-06-01 PROCEDURE — 84466 ASSAY OF TRANSFERRIN: CPT | Performed by: HOSPITALIST

## 2021-06-01 PROCEDURE — 80053 COMPREHEN METABOLIC PANEL: CPT | Performed by: HOSPITALIST

## 2021-06-01 PROCEDURE — 63710000001 INSULIN GLARGINE PER 5 UNITS: Performed by: NURSE PRACTITIONER

## 2021-06-01 PROCEDURE — 83540 ASSAY OF IRON: CPT | Performed by: HOSPITALIST

## 2021-06-01 PROCEDURE — 83735 ASSAY OF MAGNESIUM: CPT | Performed by: HOSPITALIST

## 2021-06-01 PROCEDURE — 63710000001 INSULIN LISPRO (HUMAN) PER 5 UNITS: Performed by: NURSE PRACTITIONER

## 2021-06-01 RX ORDER — LABETALOL HYDROCHLORIDE 5 MG/ML
10 INJECTION, SOLUTION INTRAVENOUS EVERY 4 HOURS PRN
Status: DISCONTINUED | OUTPATIENT
Start: 2021-06-01 | End: 2021-06-08 | Stop reason: HOSPADM

## 2021-06-01 RX ORDER — MELATONIN
1000 DAILY
Status: DISCONTINUED | OUTPATIENT
Start: 2021-06-01 | End: 2021-06-08 | Stop reason: HOSPADM

## 2021-06-01 RX ORDER — TORSEMIDE 20 MG/1
40 TABLET ORAL DAILY
Status: DISCONTINUED | OUTPATIENT
Start: 2021-06-02 | End: 2021-06-06

## 2021-06-01 RX ORDER — DILTIAZEM HYDROCHLORIDE 120 MG/1
120 CAPSULE, COATED, EXTENDED RELEASE ORAL
Status: DISCONTINUED | OUTPATIENT
Start: 2021-06-01 | End: 2021-06-03

## 2021-06-01 RX ORDER — HYDRALAZINE HYDROCHLORIDE 50 MG/1
50 TABLET, FILM COATED ORAL EVERY 8 HOURS SCHEDULED
Status: DISCONTINUED | OUTPATIENT
Start: 2021-06-01 | End: 2021-06-02

## 2021-06-01 RX ADMIN — ISOSORBIDE MONONITRATE 120 MG: 60 TABLET ORAL at 08:17

## 2021-06-01 RX ADMIN — ENOXAPARIN SODIUM 40 MG: 40 INJECTION SUBCUTANEOUS at 15:46

## 2021-06-01 RX ADMIN — HYDRALAZINE HYDROCHLORIDE 50 MG: 50 TABLET, FILM COATED ORAL at 14:12

## 2021-06-01 RX ADMIN — BUPROPION HYDROCHLORIDE 75 MG: 75 TABLET, FILM COATED ORAL at 08:17

## 2021-06-01 RX ADMIN — LABETALOL HYDROCHLORIDE 10 MG: 5 INJECTION, SOLUTION INTRAVENOUS at 15:46

## 2021-06-01 RX ADMIN — Medication 1000 UNITS: at 20:05

## 2021-06-01 RX ADMIN — HYDRALAZINE HYDROCHLORIDE 50 MG: 50 TABLET, FILM COATED ORAL at 08:55

## 2021-06-01 RX ADMIN — HYDRALAZINE HYDROCHLORIDE 50 MG: 50 TABLET, FILM COATED ORAL at 21:51

## 2021-06-01 RX ADMIN — SODIUM CHLORIDE, PRESERVATIVE FREE 10 ML: 5 INJECTION INTRAVENOUS at 08:18

## 2021-06-01 RX ADMIN — BUPROPION HYDROCHLORIDE 75 MG: 75 TABLET, FILM COATED ORAL at 20:05

## 2021-06-01 RX ADMIN — METOPROLOL SUCCINATE 150 MG: 100 TABLET, EXTENDED RELEASE ORAL at 20:05

## 2021-06-01 RX ADMIN — INSULIN GLARGINE 10 UNITS: 100 INJECTION, SOLUTION SUBCUTANEOUS at 21:51

## 2021-06-01 RX ADMIN — LABETALOL HYDROCHLORIDE 10 MG: 5 INJECTION, SOLUTION INTRAVENOUS at 20:05

## 2021-06-01 RX ADMIN — SODIUM CHLORIDE 75 ML/HR: 9 INJECTION, SOLUTION INTRAVENOUS at 01:12

## 2021-06-01 RX ADMIN — DILTIAZEM HYDROCHLORIDE 120 MG: 120 CAPSULE, COATED, EXTENDED RELEASE ORAL at 08:55

## 2021-06-01 RX ADMIN — SODIUM CHLORIDE, PRESERVATIVE FREE 10 ML: 5 INJECTION INTRAVENOUS at 20:05

## 2021-06-01 RX ADMIN — INSULIN LISPRO 8 UNITS: 100 INJECTION, SOLUTION INTRAVENOUS; SUBCUTANEOUS at 11:51

## 2021-06-01 RX ADMIN — ASPIRIN 81 MG: 81 TABLET, COATED ORAL at 08:17

## 2021-06-01 RX ADMIN — PRAVASTATIN SODIUM 40 MG: 40 TABLET ORAL at 08:17

## 2021-06-01 NOTE — CASE MANAGEMENT/SOCIAL WORK
Case Management Discharge Note      Final Note: Home via family with [facility]    Provided Post Acute Provider List?: Yes  Post Acute Provider List: Home Health    Selected Continued Care - Discharged on 5/28/2021 Admission date: 5/22/2021 - Discharge disposition: Home or Self Care    Destination    No services have been selected for the patient.              Durable Medical Equipment    No services have been selected for the patient.              Dialysis/Infusion    No services have been selected for the patient.              Home Medical Care Coordination complete    Service Provider Selected Services Address Phone Fax Patient Preferred    [facility]  Home Health Services [address] [address] [phone] --          Therapy    No services have been selected for the patient.              Community Resources    No services have been selected for the patient.                       Final Discharge Disposition Code: 06 - home with home health care

## 2021-06-01 NOTE — THERAPY TREATMENT NOTE
Patient Name: Denny Ivy  : 8/3/1929    MRN: [id number]                              Today's Date: 2021       Admit Date: 2021    Visit Dx:     ICD-10-CM ICD-9-CM   1. Generalized weakness  R53.1 780.79   2. Acute renal failure, unspecified acute renal failure type (CMS/[facility])  N17.9 584.9   3. Hyperkalemia  E87.5 276.7     Patient Active Problem List   Diagnosis   • PRABHU treated with autoBiPAP   • Hypersomnia due to medical condition   • Chronic congestive heart failure (CMS/[facility])   • HTN (hypertension)   • Type 2 diabetes mellitus, with long-term current use of insulin (CMS/[facility])   • CKD (chronic kidney disease) stage 3, GFR 30-59 ml/min (CMS/[facility])   • COLLIN (acute kidney injury) (CMS/[facility])   • Generalized weakness   • Hypotension   • Weight loss   • Anemia   • Hyperkalemia   • Chronic diastolic CHF (congestive heart failure) (CMS/[facility])     Past Medical History:   Diagnosis Date   • Arthritis    • Diabetes 1.5, managed as type 2 (CMS/[facility])    • Edema    • Hypertension    • Hypothyroidism    • Low back pain    • Macular degeneration      Past Surgical History:   Procedure Laterality Date   • CARDIAC SURGERY     • CHOLECYSTECTOMY     • HIATAL HERNIA REPAIR     • HYSTERECTOMY       General Information     Row Name 21 1454          Physical Therapy Time and Intention    Document Type  therapy note (daily note)  -     Mode of Treatment  physical therapy  -     Row Name 21 1454          General Information    Patient Profile Reviewed  yes  -PC     Existing Precautions/Restrictions  fall  -PC     Row Name 21 1454          Cognition    Orientation Status (Cognition)  oriented x 3  -PC     Row Name 21 1454          Safety Issues, Functional Mobility    Impairments Affecting Function (Mobility)  endurance/activity tolerance;strength;sensation/sensory awareness  -       User Key  (r) = Recorded By, (t) = Taken By, (c) = Cosigned By    Initials Name Provider Type    PC Anum Zazueta,  PT Physical Therapist        Mobility     Row Name 06/01/21 1454          Bed Mobility    Bed Mobility  supine-sit-supine  -PC     Supine-Sit-Supine Izard (Bed Mobility)  moderate assist (50% patient effort);1 person assist  -PC     Assistive Device (Bed Mobility)  draw sheet;bed rails  -PC     Row Name 06/01/21 1454          Sit-Stand Transfer    Sit-Stand Izard (Transfers)  minimum assist (75% patient effort);1 person assist  -PC     Assistive Device (Sit-Stand Transfers)  walker, front-wheeled  -PC     Row Name 06/01/21 1454          Gait/Stairs (Locomotion)    Izard Level (Gait)  1 person assist;minimum assist (75% patient effort);2 person assist  -PC     Assistive Device (Gait)  walker, front-wheeled  -PC     Distance in Feet (Gait)  40  -PC     Deviations/Abnormal Patterns (Gait)  lenny decreased;gait speed decreased;base of support, wide  -PC     Bilateral Gait Deviations  forward flexed posture;heel strike decreased  -PC     Comment (Gait/Stairs)  pt completely forward flexed over walker, leaning forearms on walker, unable to correct  -PC       User Key  (r) = Recorded By, (t) = Taken By, (c) = Cosigned By    Initials Name Provider Type    PC Anum Zazueta, PT Physical Therapist        Obj/Interventions    No documentation.       Goals/Plan    No documentation.       Clinical Impression     Row Name 06/01/21 1452          Plan of Care Review    Plan of Care Reviewed With  patient;caregiver  -PC     Outcome Summary  pt with no significant improvement from yesterday, still requiring assist for all mobility, able to walk short distance with walker and assist, but gait quality is poor with forward flexed posture, wide based gait  -PC     Row Name 06/01/21 1455          Positioning and Restraints    Pre-Treatment Position  in bed  -PC     Post Treatment Position  bed  -PC     In Bed  supine;call light within reach;encouraged to call for assist;exit alarm on;with family/caregiver  -PC        User Key  (r) = Recorded By, (t) = Taken By, (c) = Cosigned By    Initials Name Provider Type    PC Anum Zazueta, PT Physical Therapist        Outcome Measures     Row Name 06/01/21 1920          How much help from another person do you currently need...    Turning from your back to your side while in flat bed without using bedrails?  3  -PC     Moving from lying on back to sitting on the side of a flat bed without bedrails?  2  -PC     Moving to and from a bed to a chair (including a wheelchair)?  2  -PC     Standing up from a chair using your arms (e.g., wheelchair, bedside chair)?  2  -PC     Climbing 3-5 steps with a railing?  1  -PC     To walk in hospital room?  2  -PC     AM-PAC 6 Clicks Score (PT)  12  -PC       User Key  (r) = Recorded By, (t) = Taken By, (c) = Cosigned By    Initials Name Provider Type    PC Anum Zazueta, PT Physical Therapist        Physical Therapy Education                 Title: PT OT SLP Therapies (Done)     Topic: Physical Therapy (Done)     Point: Mobility training (Done)     Learning Progress Summary           Patient Acceptance, E,D, DU,NR by  at 6/1/2021 1500    Acceptance, E, VU by  at 5/31/2021 1444                   Point: Home exercise program (Done)     Learning Progress Summary           Patient Acceptance, E, VU by  at 5/31/2021 1444                   Point: Body mechanics (Done)     Learning Progress Summary           Patient Acceptance, E,D, DU,NR by  at 6/1/2021 1500    Acceptance, E, VU by  at 5/31/2021 1444                   Point: Precautions (Done)     Learning Progress Summary           Patient Acceptance, E,D, DU,NR by  at 6/1/2021 1500    Acceptance, E, VU by  at 5/31/2021 1444                               User Key     Initials Effective Dates Name Provider Type Discipline     04/03/18 -  Anum Zazueta PT Physical Therapist PT     04/03/18 -  Semaj España PT Physical Therapist PT              PT Recommendation and Plan     Plan  of Care Reviewed With: patient, caregiver  Outcome Summary: pt with no significant improvement from yesterday, still requiring assist for all mobility, able to walk short distance with walker and assist, but gait quality is poor with forward flexed posture, wide based gait     Time Calculation:   PT Charges     Row Name 06/01/21 1500 06/01/21 0949          Time Calculation    Start Time  1341  -PC  --     Stop Time  1355  -PC  --     Time Calculation (min)  14 min  -PC  --     PT Received On  06/01/21  -PC  05/31/21  -RD     PT - Next Appointment  06/02/21  -PC  06/01/21  -RD     PT Goal Re-Cert Due Date  --  06/07/21  -RD       User Key  (r) = Recorded By, (t) = Taken By, (c) = Cosigned By    Initials Name Provider Type    Dara Gracia, PT Physical Therapist    PC Anum Zazueta, PT Physical Therapist        Therapy Charges for Today     Code Description Service Date Service Provider Modifiers Qty    86364567844 HC PT THER PROC EA 15 MIN 6/1/2021 Anmu Zazueta, PT GP 1    44914995527 HC PT THER SUPP EA 15 MIN 6/1/2021 Anum Zazueta, PT GP 1          PT G-Codes  Outcome Measure Options: AM-PAC 6 Clicks Basic Mobility (PT)  AM-PAC 6 Clicks Score (PT): 12    Anum Zazueta, PT  6/1/2021

## 2021-06-01 NOTE — PLAN OF CARE
Problem: Adult Inpatient Plan of Care  Goal: Plan of Care Review  Outcome: Ongoing, Progressing  Flowsheets (Taken 6/1/2021 1439)  Progress: improving  Plan of Care Reviewed With:   patient   caregiver   Goal Outcome Evaluation:  Plan of Care Reviewed With: patient, caregiver  Progress: improving     Pt has been hypertensive throughout the day but her blood pressure is getting better. Hydralazine and Cardizem have been added to her daily medications. She and her family will need further education about her home medications at discharge. She told staff that she couldn't walk and then the caregiver told staff she walks everyday with her walker. Pt said she just doesn't want to get up that is all. Pt has been sleeping on and off throughout the day. Cardiology is following and adjusting medications as needed, will continue to monitor.

## 2021-06-01 NOTE — PROGRESS NOTES
"    Patient Name: Denny Ivy  :8/3/1929  89 y.o.      Patient Care Team:  Мария Wilks MD as PCP - General (Internal Medicine)  Мария Wilks MD as PCP - Internal Medicine    Chief Complaint: follow up hypertension, chronic diastolic heart failure    Interval History: mentation improved. Eating better per care giver at bedside. IVF turned off today.        Objective   Vital Signs  Temp:  [97.6 °F (36.4 °C)-98.5 °F (36.9 °C)] 97.6 °F (36.4 °C)  Heart Rate:  [75-88] 75  Resp:  [16-20] 18  BP: (177-207)/(63-86) 177/63    Intake/Output Summary (Last 24 hours) at 2021 1331  Last data filed at 2021 0828  Gross per 24 hour   Intake 1615 ml   Output 900 ml   Net 715 ml     Flowsheet Rows      First Filed Value   Admission Height  154.9 cm (61\") Documented at 2021   Admission Weight  83.8 kg (184 lb 11.2 oz) Documented at 2021          Physical Exam:   General Appearance:   sleeping   Lungs:     Clear to auscultation.  Normal respiratory effort and rate.      Heart:    Regular rhythm and normal rate, normal S1 and S2, no murmurs, gallops or rubs.     Chest Wall:    No abnormalities observed   Abdomen:     Soft, nontender, positive bowel sounds.     Extremities:   no cyanosis, clubbing or edema.  No marked joint deformities.  Adequate musculoskeletal strength.       Results Review:    Results from last 7 days   Lab Units 21  0601   SODIUM mmol/L 140   POTASSIUM mmol/L 4.0   CHLORIDE mmol/L 108*   CO2 mmol/L 28.5   BUN mg/dL 36*   CREATININE mg/dL 1.04*   GLUCOSE mg/dL 119*   CALCIUM mg/dL 8.8         Results from last 7 days   Lab Units 21  0601   WBC 10*3/mm3 4.22   HEMOGLOBIN g/dL 9.6*   HEMATOCRIT % 29.4*   PLATELETS 10*3/mm3 162         Results from last 7 days   Lab Units 21  0601   MAGNESIUM mg/dL 2.0                   Medication Review:   aspirin, 81 mg, Oral, Daily  buPROPion, 75 mg, Oral, Q12H  dilTIAZem CD, 120 mg, Oral, Q24H  hydrALAZINE, 50 mg, Oral, " Q8H  insulin glargine, 10 Units, Subcutaneous, Nightly  insulin lispro, 0-14 Units, Subcutaneous, TID AC  isosorbide mononitrate, 120 mg, Oral, Daily  metoprolol succinate XL, 150 mg, Oral, Nightly  pravastatin, 40 mg, Oral, Daily  sodium chloride, 10 mL, Intravenous, Q12H              Assessment/Plan       1.  Dyspnea most likely secondary to acute diastolic heart failure.  2.  Hypertension   3.  Chronic kidney disease  4.  Diabetes  5.  Obstructive sleep apnea.  6.  Acute mental status change    - severely elevated BP this morning. Oral hydralazine started. She also has as needed IV labetalol as needed.   - mental status improved. IVF off now. Improved oral intake. Plan on restarting oral diuretic tomorrow.     DENNY Lopez  [facility]  06/01/21  13:31 EDT

## 2021-06-01 NOTE — PLAN OF CARE
Goal Outcome Evaluation:  Plan of Care Reviewed With: patient, caregiver     Outcome Summary: pt with no significant improvement from yesterday, still requiring assist for all mobility, able to walk short distance with walker and assist, but gait quality is poor with forward flexed posture, wide based gait  Patient was intermittently wearing a face mask during this therapy encounter. Therapist used appropriate personal protective equipment including eye protection, mask, and gloves.  Mask used was standard procedure mask. Appropriate PPE was worn during the entire therapy session. Hand hygiene was completed before and after therapy session. Patient is not in enhanced droplet precautions.

## 2021-06-01 NOTE — CASE MANAGEMENT/SOCIAL WORK
Discharge Planning Assessment  [facility]     Patient Name: Denny Ivy  MRN: [id number]  Today's Date: 6/1/2021    Admit Date: 5/30/2021    Discharge Needs Assessment     Row Name 06/01/21 1335       Living Environment    Lives With  child(darrian), adult    Current Living Arrangements  home/apartment/condo    Primary Care Provided by  self    Provides Primary Care For  no one    Family Caregiver if Needed  none    Quality of Family Relationships  supportive       Resource/Environmental Concerns    Transportation Concerns  car, none       Transition Planning    Patient/Family Anticipates Transition to  home with family    Patient/Family Anticipated Services at Transition  none    Transportation Anticipated  family or friend will provide       Discharge Needs Assessment    Equipment Currently Used at Home  bath bench;cpap    Equipment Needed After Discharge  walker, rolling;wheelchair, manual        Discharge Plan     Row Name 06/01/21 3600       Plan    Plan  Home with HH and caregivers    Patient/Family in Agreement with Plan  yes    Plan Comments  Met with pt and caregiver at bedside. Introduced self, explained CCP role, facesheet verified. Pt lives with daughter/Jade and has 24 hour caregivers.  Has used HH in past and would like to use [facility].  Referral placed in Epic.  No further needs identified.  CCP will follow.  SAAD Schmidt RN        Continued Care and Services - Admitted Since 5/30/2021     Home Medical Care     Service Provider Request Status Selected Services Address Phone Fax Patient Preferred    [facility]  Pending - Request Sent N/A [address] [id number] [phone] --            Selected Continued Care - Prior Encounters Includes selections from prior encounters from 3/1/2021 to 6/1/2021    Discharged on 5/28/2021 Admission date: 5/22/2021 - Discharge disposition: Home or Self Care    Home Medical Care     Service Provider Selected Services  Address Phone Fax Patient Preferred     [facility]  Home Health Services [address] [address] [phone] --                      Demographic Summary     Row Name 06/01/21 1331       General Information    Admission Type  inpatient    Arrived From  home    Referral Source  admission list    Reason for Consult  discharge planning    Preferred Language  English       Contact Information    Permission Granted to Share Info With  family/designee Smitha ferguson (granddaughter) [phone]        Functional Status    No documentation.       Psychosocial    No documentation.       Abuse/Neglect    No documentation.       Legal    No documentation.       Substance Abuse    No documentation.       Patient Forms    No documentation.           Ciara Schmidt RN

## 2021-06-01 NOTE — DISCHARGE PLACEMENT REQUEST
"Darnell Ivy (89 y.o. Female)     Date of Birth Social Security Number Address Home Phone MRN    08/03/1929  [address] [facility]   [place] [address] [phone] [id number]    Spiritism Marital Status          [employer]        Admission Date Admission Type Admitting Provider Attending Provider Department, Room/Bed    5/30/21 Emergency Alfredito Vázquez MD Hinsberg, Francis J, DO [address], [address]/1    Discharge Date Discharge Disposition Discharge Destination                       Attending Provider: Geoffrey Potts DO    Allergies: Vasotec [Enalapril], Albuterol, Atacand Hct [Candesartan Cilexetil-hctz], Calan [Verapamil], Glucophage [Metformin Hcl], Hytrin [Terazosin], Metolazone, Norvasc [Amlodipine Besylate], Thiazide-type Diuretics    Isolation: None   Infection: None   Code Status: CPR    Ht: 154.9 cm (61\")   Wt: 87.6 kg (193 lb 3.2 oz)    Admission Cmt: None   Principal Problem: Generalized weakness [R53.1]                 Active Insurance as of 5/30/2021     Primary Coverage     Payor Plan Insurance Group Employer/Plan Group    MEDICARE MEDICARE A & B      Payor Plan Address Payor Plan Phone Number Payor Plan Fax Number Effective Dates    [address] [phone]  8/1/1994 - None Entered    [facility] [id number]       Subscriber Name Subscriber Birth Date Member ID       DARNELL IVY 8/3/1929 [id number]           Secondary Coverage     Payor Plan Insurance Group Employer/Plan Group    [employer] SUP AAR HEALTH CARE OPTIONS      Payor Plan Address Payor Plan Phone Number Payor Plan Fax Number Effective Dates    [facility] [phone]  1/1/2016 - None Entered    [address]       [facility] [address]       Subscriber Name Subscriber Birth Date Member ID       DARNELL IVY 8/3/1929 [id number]                 Emergency Contacts      (Rel.) Home Phone Work Phone Mobile Phone    JENNIFER IVY (Grandchild) [phone] -- " [phone]    Jade Dwyer (Daughter) [phone] -- [phone]

## 2021-06-01 NOTE — PROGRESS NOTES
[facility]    PROGRESS NOTE    Name:  Denny Ivy   Age:  89 y.o.  Sex:  female  :  8/3/1929  MRN:  [id number]   Visit Number:  [id number]  Admission Date:  2021  Date Of Service:  21  Primary Care Physician:  Мария Wilks MD     LOS: 1 day :  Patient Care Team:  Мария Wilks MD as PCP - General (Internal Medicine)  Мария Wilks MD as PCP - Internal Medicine:    History taken from:     patient chart    Chief Complaint:      Weakness    Subjective     Interval History:     Patient seen and examined today.  Reviewed history and physical, lab work, x-rays, chart.    89-year-old female with type 2 diabetes, hypertension, hypothyroidism, diastolic CHF, chronic kidney disease was discharged from the hospital 2021 after being admitted for congestive heart failure.  She went from 190 pounds down to 180.  She continued not to eat after being discharged and was down to 168 pounds.  Creatinine was up to 1.76.  Patient was admitted placed on IV fluids, diuretics were held.  She was noted to be hypotensive as well.  She was confused.    After patient was given IV fluids her creatinine did come down to 1.04.  Unfortunately her blood pressure has gone up as well.  Added back hydralazine today, plan is to add back diuretics tomorrow.  Cardiology is following.    Patient herself denies any chest pressure, shortness of breath, nausea, vomiting, pain.  Physical therapy has been consulted.    Review of Systems:     All systems were reviewed and negative except for:  Musculoskeletal: positive for  muscle weakness    Objective     Vital Signs:    Temp:  [97.6 °F (36.4 °C)-98.5 °F (36.9 °C)] 97.6 °F (36.4 °C)  Heart Rate:  [75-88] 75  Resp:  [16-20] 18  BP: (177-207)/(63-86) 177/63    Physical Exam:    General: Alert and oriented x3, mild distress.  Heart: Regular rate and rhythm without murmur rub or thrill.  Lungs: Decreased breath sounds bilaterally without use of  accessory muscles respiration.  Abdomen: Soft/nontender/nondistended.  No HSM noted.  MSK: 4/5 strength in upper/lower extremities bilaterally.     Results Review:      I reviewed the patient's new clinical results.  I reviewed the patient's new imaging results and agree with the interpretation.  I reviewed the patient's other test results and agree with the interpretation    Labs:    Lab Results (last 24 hours)     Procedure Component Value Units Date/Time    POC Glucose Once [[id number]]  (Abnormal) Collected: 06/01/21 1130    Specimen: Blood Updated: 06/01/21 1133     Glucose 283 mg/dL     Folate [[id number]]  (Normal) Collected: 06/01/21 0601    Specimen: Blood Updated: 06/01/21 0749     Folate >20.00 ng/mL     Narrative:      Results may be falsely increased if patient taking Biotin.      Vitamin B12 [[id number]]  (Abnormal) Collected: 06/01/21 0601    Specimen: Blood Updated: 06/01/21 0749     Vitamin B-12 1,031 pg/mL     Narrative:      Results may be falsely increased if patient taking Biotin.      Vitamin D 25 Hydroxy [[id number]]  (Abnormal) Collected: 06/01/21 0601    Specimen: Blood Updated: 06/01/21 0749     25 Hydroxy, Vitamin D 29.0 ng/ml     Narrative:      Reference Range for Total Vitamin D 25(OH)     Deficiency <20.0 ng/mL   Insufficiency 21-29 ng/mL   Sufficiency  ng/mL  Toxicity >100 ng/ml    Results may be falsely increased if patient taking Biotin.      TSH [[id number]]  (Normal) Collected: 06/01/21 0601    Specimen: Blood Updated: 06/01/21 0740     TSH 2.200 uIU/mL     Ferritin [[id number]]  (Normal) Collected: 06/01/21 0601    Specimen: Blood Updated: 06/01/21 0740     Ferritin 64.20 ng/mL     Narrative:      Results may be falsely decreased if patient taking Biotin.      Comprehensive Metabolic Panel [[id number]]  (Abnormal) Collected: 06/01/21 0601    Specimen: Blood Updated: 06/01/21 0735     Glucose 119 mg/dL      BUN 36 mg/dL      Creatinine 1.04 mg/dL      Sodium 140 mmol/L       Potassium 4.0 mmol/L      Chloride 108 mmol/L      CO2 28.5 mmol/L      Calcium 8.8 mg/dL      Total Protein 5.8 g/dL      Albumin 3.10 g/dL      ALT (SGPT) 20 U/L      AST (SGOT) 12 U/L      Alkaline Phosphatase 46 U/L      Total Bilirubin 0.3 mg/dL      eGFR  African Amer 60 mL/min/1.73      Globulin 2.7 gm/dL      A/G Ratio 1.1 g/dL      BUN/Creatinine Ratio 34.6     Anion Gap 3.5 mmol/L     Narrative:      GFR Normal >60  Chronic Kidney Disease <60  Kidney Failure <15      Iron Profile [[id number]]  (Abnormal) Collected: 06/01/21 0601    Specimen: Blood Updated: 06/01/21 0735     Iron 79 mcg/dL      Iron Saturation 27 %      Transferrin 194 mg/dL      TIBC 289 mcg/dL     Magnesium [[id number]]  (Normal) Collected: 06/01/21 0601    Specimen: Blood Updated: 06/01/21 0735     Magnesium 2.0 mg/dL     CBC & Differential [[id number]]  (Abnormal) Collected: 06/01/21 0601    Specimen: Blood Updated: 06/01/21 0716    Narrative:      The following orders were created for panel order CBC & Differential.  Procedure                               Abnormality         Status                     ---------                               -----------         ------                     CBC Auto Differential[[id number]]        Abnormal            Final result                 Please view results for these tests on the individual orders.    CBC Auto Differential [[id number]]  (Abnormal) Collected: 06/01/21 0601    Specimen: Blood Updated: 06/01/21 0716     WBC 4.22 10*3/mm3      RBC 3.14 10*6/mm3      Hemoglobin 9.6 g/dL      Hematocrit 29.4 %      MCV 93.6 fL      MCH 30.6 pg      MCHC 32.7 g/dL      RDW 12.8 %      RDW-SD 43.7 fl      MPV 10.4 fL      Platelets 162 10*3/mm3      Neutrophil % 62.9 %      Lymphocyte % 19.0 %      Monocyte % 13.5 %      Eosinophil % 3.6 %      Basophil % 0.5 %      Immature Grans % 0.5 %      Neutrophils, Absolute 2.66 10*3/mm3      Lymphocytes, Absolute 0.80 10*3/mm3      Monocytes, Absolute 0.57 10*3/mm3       Eosinophils, Absolute 0.15 10*3/mm3      Basophils, Absolute 0.02 10*3/mm3      Immature Grans, Absolute 0.02 10*3/mm3      nRBC 0.0 /100 WBC     POC Glucose Once [[id number]]  (Normal) Collected: 06/01/21 0618    Specimen: Blood Updated: 06/01/21 0619     Glucose 117 mg/dL     POC Glucose Once [[id number]]  (Abnormal) Collected: 05/31/21 2057    Specimen: Blood Updated: 05/31/21 2059     Glucose 167 mg/dL            Radiology:    Imaging Results (Last 24 Hours)     ** No results found for the last 24 hours. **          Medication Review:     aspirin, 81 mg, Oral, Daily  buPROPion, 75 mg, Oral, Q12H  dilTIAZem CD, 120 mg, Oral, Q24H  hydrALAZINE, 50 mg, Oral, Q8H  insulin glargine, 10 Units, Subcutaneous, Nightly  insulin lispro, 0-14 Units, Subcutaneous, TID AC  isosorbide mononitrate, 120 mg, Oral, Daily  metoprolol succinate XL, 150 mg, Oral, Nightly  pravastatin, 40 mg, Oral, Daily  sodium chloride, 10 mL, Intravenous, Q12H  [START ON 6/2/2021] torsemide, 40 mg, Oral, Daily             Assessment/Plan     Principal Problem:    Generalized weakness  Active Problems:    PRABHU treated with autoBiPAP    HTN (hypertension)    Type 2 diabetes mellitus, with long-term current use of insulin (CMS/[facility])    CKD (chronic kidney disease) stage 3, GFR 30-59 ml/min (CMS/[facility])    COLLIN (acute kidney injury) (CMS/[facility])    Hypotension    Weight loss    Anemia    Hyperkalemia    Chronic diastolic CHF (congestive heart failure) (CMS/[facility])      1.  Hypotension, likely due to overdiuresis.  2.  Hypertension, uncontrolled, add back antihypertensives.  3.  Diabetes mellitus type 2  4.  Chronic kidney disease stage III  5.  PRABHU  6.  Metabolic encephalopathy, likely due to low blood pressure, resolved.    Plan:    Restart hydralazine.  Monitor blood pressure.  Monitor lab work.  Monitor blood sugars.  Cardiology following.  Lovenox added for DVT prophylaxis.  Further recommendations will depend on the clinical course.    Geoffrey Potts,  DO  06/01/21  15:32 EDT

## 2021-06-01 NOTE — NURSING NOTE
Page placed to cardiology r/t hypertension this AM. /79, 200/86, 204/80, pt asymptomatic. Per MD, NNO at this time, will round this AM.

## 2021-06-01 NOTE — PLAN OF CARE
Problem: Adult Inpatient Plan of Care  Goal: Plan of Care Review  Outcome: Ongoing, Progressing  Goal: Patient-Specific Goal (Individualized)  Outcome: Ongoing, Progressing  Goal: Absence of Hospital-Acquired Illness or Injury  Outcome: Ongoing, Progressing  Intervention: Identify and Manage Fall Risk  Recent Flowsheet Documentation  Taken 6/1/2021 0600 by Didi Martinez, RN  Safety Promotion/Fall Prevention:   activity supervised   assistive device/personal items within reach   clutter free environment maintained   fall prevention program maintained   nonskid shoes/slippers when out of bed   room organization consistent   safety round/check completed  Taken 6/1/2021 0400 by Didi Martinez, RN  Safety Promotion/Fall Prevention:   activity supervised   assistive device/personal items within reach   clutter free environment maintained   fall prevention program maintained   nonskid shoes/slippers when out of bed   room organization consistent   safety round/check completed  Taken 6/1/2021 0200 by Didi Martinez, RN  Safety Promotion/Fall Prevention:   activity supervised   assistive device/personal items within reach   clutter free environment maintained   fall prevention program maintained   nonskid shoes/slippers when out of bed   room organization consistent   safety round/check completed  Taken 6/1/2021 0000 by Didi Martinez, RN  Safety Promotion/Fall Prevention:   activity supervised   assistive device/personal items within reach   clutter free environment maintained   fall prevention program maintained   nonskid shoes/slippers when out of bed   room organization consistent   safety round/check completed  Taken 5/31/2021 2200 by Didi Martinez, RN  Safety Promotion/Fall Prevention:   activity supervised   assistive device/personal items within reach   clutter free environment maintained   fall prevention program maintained   nonskid shoes/slippers when out of bed   safety round/check completed   room  organization consistent  Taken 5/31/2021 2000 by Didi Martinez RN  Safety Promotion/Fall Prevention:   activity supervised   assistive device/personal items within reach   clutter free environment maintained   fall prevention program maintained   nonskid shoes/slippers when out of bed   room organization consistent   safety round/check completed  Intervention: Prevent Skin Injury  Recent Flowsheet Documentation  Taken 6/1/2021 0600 by Didi Martinez RN  Body Position: weight shift assistance provided  Taken 6/1/2021 0400 by Didi Martinez RN  Body Position: weight shift assistance provided  Taken 6/1/2021 0200 by Didi Martinez RN  Body Position: weight shift assistance provided  Taken 6/1/2021 0000 by Didi Martinez RN  Body Position: weight shift assistance provided  Taken 5/31/2021 2200 by Didi Martinez RN  Body Position: weight shift assistance provided  Taken 5/31/2021 2000 by Didi Martinez RN  Body Position: position maintained  Intervention: Prevent and Manage VTE (venous thromboembolism) Risk  Recent Flowsheet Documentation  Taken 5/31/2021 2000 by Didi Martinez RN  VTE Prevention/Management:   sequential compression devices off   patient refused intervention  Intervention: Prevent Infection  Recent Flowsheet Documentation  Taken 6/1/2021 0600 by Didi Martinez RN  Infection Prevention:   rest/sleep promoted   single patient room provided  Taken 6/1/2021 0400 by Didi Martinez RN  Infection Prevention:   rest/sleep promoted   single patient room provided  Taken 6/1/2021 0200 by Didi Martinez RN  Infection Prevention:   rest/sleep promoted   single patient room provided  Taken 6/1/2021 0000 by Didi Martinez RN  Infection Prevention:   rest/sleep promoted   single patient room provided  Taken 5/31/2021 2200 by Didi Martinez RN  Infection Prevention:   rest/sleep promoted   single patient room provided  Goal: Optimal Comfort and Wellbeing  Outcome: Ongoing,  Progressing  Intervention: Provide Person-Centered Care  Recent Flowsheet Documentation  Taken 5/31/2021 2000 by Didi Martinez RN  Trust Relationship/Rapport:   care explained   choices provided   questions answered   questions encouraged   thoughts/feelings acknowledged  Goal: Readiness for Transition of Care  Outcome: Ongoing, Progressing     Problem: Fall Injury Risk  Goal: Absence of Fall and Fall-Related Injury  Outcome: Ongoing, Progressing  Intervention: Identify and Manage Contributors to Fall Injury Risk  Recent Flowsheet Documentation  Taken 6/1/2021 0600 by Didi Martinez RN  Medication Review/Management: medications reviewed  Taken 6/1/2021 0400 by Didi Martinez RN  Medication Review/Management: medications reviewed  Taken 6/1/2021 0200 by Didi Martinez RN  Medication Review/Management: medications reviewed  Taken 6/1/2021 0000 by Didi Martinez RN  Medication Review/Management: medications reviewed  Taken 5/31/2021 2200 by Didi Martinez RN  Medication Review/Management: medications reviewed  Taken 5/31/2021 2000 by Didi Martinez RN  Medication Review/Management: medications reviewed  Intervention: Promote Injury-Free Environment  Recent Flowsheet Documentation  Taken 6/1/2021 0600 by Didi Martinez RN  Safety Promotion/Fall Prevention:   activity supervised   assistive device/personal items within reach   clutter free environment maintained   fall prevention program maintained   nonskid shoes/slippers when out of bed   room organization consistent   safety round/check completed  Taken 6/1/2021 0400 by Didi Martinez RN  Safety Promotion/Fall Prevention:   activity supervised   assistive device/personal items within reach   clutter free environment maintained   fall prevention program maintained   nonskid shoes/slippers when out of bed   room organization consistent   safety round/check completed  Taken 6/1/2021 0200 by Didi Martinez, RN  Safety Promotion/Fall Prevention:    activity supervised   assistive device/personal items within reach   clutter free environment maintained   fall prevention program maintained   nonskid shoes/slippers when out of bed   room organization consistent   safety round/check completed  Taken 6/1/2021 0000 by Didi Martinez RN  Safety Promotion/Fall Prevention:   activity supervised   assistive device/personal items within reach   clutter free environment maintained   fall prevention program maintained   nonskid shoes/slippers when out of bed   room organization consistent   safety round/check completed  Taken 5/31/2021 2200 by Didi Martinez RN  Safety Promotion/Fall Prevention:   activity supervised   assistive device/personal items within reach   clutter free environment maintained   fall prevention program maintained   nonskid shoes/slippers when out of bed   safety round/check completed   room organization consistent  Taken 5/31/2021 2000 by Didi Martinez RN  Safety Promotion/Fall Prevention:   activity supervised   assistive device/personal items within reach   clutter free environment maintained   fall prevention program maintained   nonskid shoes/slippers when out of bed   room organization consistent   safety round/check completed     Problem: Skin Injury Risk Increased  Goal: Skin Health and Integrity  Outcome: Ongoing, Progressing  Intervention: Optimize Skin Protection  Recent Flowsheet Documentation  Taken 6/1/2021 0600 by Didi Martinez RN  Head of Bed (HOB): HOB elevated  Taken 6/1/2021 0400 by Didi Martinez RN  Head of Bed (HOB): HOB elevated  Taken 6/1/2021 0200 by Didi Martinez RN  Head of Bed (HOB): HOB elevated  Taken 6/1/2021 0000 by Didi Martinez RN  Head of Bed (HOB): HOB elevated  Taken 5/31/2021 2200 by Didi Martinez RN  Head of Bed (HOB): HOB elevated  Taken 5/31/2021 2000 by Didi Martinez RN  Head of Bed (HOB): HOB elevated   Goal Outcome Evaluation:   A&O, unsure of year. BP elevated this AM, MD aware, all  other VSS. Continues on IVF. Encouraging PO intake. Will continue to monitor.

## 2021-06-02 ENCOUNTER — TRANSCRIBE ORDERS (OUTPATIENT)
Dept: HOME HEALTH SERVICES | Facility: HOME HEALTHCARE | Age: 86
End: 2021-06-02

## 2021-06-02 ENCOUNTER — HOME HEALTH ADMISSION (OUTPATIENT)
Dept: HOME HEALTH SERVICES | Facility: HOME HEALTHCARE | Age: 86
End: 2021-06-02

## 2021-06-02 DIAGNOSIS — I10 ESSENTIAL HYPERTENSION, MALIGNANT: Primary | ICD-10-CM

## 2021-06-02 LAB
ALBUMIN SERPL-MCNC: 3 G/DL (ref 3.5–5.2)
ANION GAP SERPL CALCULATED.3IONS-SCNC: 7.7 MMOL/L (ref 5–15)
BUN SERPL-MCNC: 31 MG/DL (ref 8–23)
BUN/CREAT SERPL: 31.6 (ref 7–25)
CALCIUM SPEC-SCNC: 8.9 MG/DL (ref 8.2–9.6)
CHLORIDE SERPL-SCNC: 108 MMOL/L (ref 98–107)
CO2 SERPL-SCNC: 26.3 MMOL/L (ref 22–29)
CREAT SERPL-MCNC: 0.98 MG/DL (ref 0.57–1)
DEPRECATED RDW RBC AUTO: 45.9 FL (ref 37–54)
ERYTHROCYTE [DISTWIDTH] IN BLOOD BY AUTOMATED COUNT: 13 % (ref 12.3–15.4)
GFR SERPL CREATININE-BSD FRML MDRD: 64 ML/MIN/1.73
GLUCOSE BLDC GLUCOMTR-MCNC: 129 MG/DL (ref 70–130)
GLUCOSE BLDC GLUCOMTR-MCNC: 146 MG/DL (ref 70–130)
GLUCOSE BLDC GLUCOMTR-MCNC: 169 MG/DL (ref 70–130)
GLUCOSE BLDC GLUCOMTR-MCNC: 209 MG/DL (ref 70–130)
GLUCOSE SERPL-MCNC: 151 MG/DL (ref 65–99)
HCT VFR BLD AUTO: 31.4 % (ref 34–46.6)
HGB BLD-MCNC: 10.1 G/DL (ref 12–15.9)
MAGNESIUM SERPL-MCNC: 2.1 MG/DL (ref 1.7–2.3)
MCH RBC QN AUTO: 30.7 PG (ref 26.6–33)
MCHC RBC AUTO-ENTMCNC: 32.2 G/DL (ref 31.5–35.7)
MCV RBC AUTO: 95.4 FL (ref 79–97)
PHOSPHATE SERPL-MCNC: 2.8 MG/DL (ref 2.5–4.5)
PLATELET # BLD AUTO: 160 10*3/MM3 (ref 140–450)
PMV BLD AUTO: 10.6 FL (ref 6–12)
POTASSIUM SERPL-SCNC: 4.4 MMOL/L (ref 3.5–5.2)
RBC # BLD AUTO: 3.29 10*6/MM3 (ref 3.77–5.28)
SODIUM SERPL-SCNC: 142 MMOL/L (ref 136–145)
WBC # BLD AUTO: 4.44 10*3/MM3 (ref 3.4–10.8)

## 2021-06-02 PROCEDURE — 97116 GAIT TRAINING THERAPY: CPT

## 2021-06-02 PROCEDURE — 83735 ASSAY OF MAGNESIUM: CPT | Performed by: INTERNAL MEDICINE

## 2021-06-02 PROCEDURE — 63710000001 INSULIN LISPRO (HUMAN) PER 5 UNITS: Performed by: NURSE PRACTITIONER

## 2021-06-02 PROCEDURE — 63710000001 INSULIN GLARGINE PER 5 UNITS: Performed by: NURSE PRACTITIONER

## 2021-06-02 PROCEDURE — 85027 COMPLETE CBC AUTOMATED: CPT | Performed by: INTERNAL MEDICINE

## 2021-06-02 PROCEDURE — 80069 RENAL FUNCTION PANEL: CPT | Performed by: INTERNAL MEDICINE

## 2021-06-02 PROCEDURE — 99232 SBSQ HOSP IP/OBS MODERATE 35: CPT | Performed by: NURSE PRACTITIONER

## 2021-06-02 PROCEDURE — 82962 GLUCOSE BLOOD TEST: CPT

## 2021-06-02 RX ORDER — HYDRALAZINE HYDROCHLORIDE 50 MG/1
100 TABLET, FILM COATED ORAL EVERY 8 HOURS SCHEDULED
Status: DISCONTINUED | OUTPATIENT
Start: 2021-06-02 | End: 2021-06-08 | Stop reason: HOSPADM

## 2021-06-02 RX ADMIN — BUPROPION HYDROCHLORIDE 75 MG: 75 TABLET, FILM COATED ORAL at 20:41

## 2021-06-02 RX ADMIN — SODIUM CHLORIDE, PRESERVATIVE FREE 10 ML: 5 INJECTION INTRAVENOUS at 08:27

## 2021-06-02 RX ADMIN — INSULIN LISPRO 3 UNITS: 100 INJECTION, SOLUTION INTRAVENOUS; SUBCUTANEOUS at 08:27

## 2021-06-02 RX ADMIN — TORSEMIDE 40 MG: 20 TABLET ORAL at 08:26

## 2021-06-02 RX ADMIN — ASPIRIN 81 MG: 81 TABLET, COATED ORAL at 08:26

## 2021-06-02 RX ADMIN — HYDRALAZINE HYDROCHLORIDE 50 MG: 50 TABLET, FILM COATED ORAL at 06:30

## 2021-06-02 RX ADMIN — INSULIN GLARGINE 10 UNITS: 100 INJECTION, SOLUTION SUBCUTANEOUS at 21:47

## 2021-06-02 RX ADMIN — DILTIAZEM HYDROCHLORIDE 120 MG: 120 CAPSULE, COATED, EXTENDED RELEASE ORAL at 08:27

## 2021-06-02 RX ADMIN — ISOSORBIDE MONONITRATE 120 MG: 60 TABLET ORAL at 08:26

## 2021-06-02 RX ADMIN — HYDRALAZINE HYDROCHLORIDE 100 MG: 50 TABLET, FILM COATED ORAL at 13:29

## 2021-06-02 RX ADMIN — LABETALOL HYDROCHLORIDE 10 MG: 5 INJECTION, SOLUTION INTRAVENOUS at 08:26

## 2021-06-02 RX ADMIN — Medication 1000 UNITS: at 08:26

## 2021-06-02 RX ADMIN — HYDRALAZINE HYDROCHLORIDE 100 MG: 50 TABLET, FILM COATED ORAL at 21:46

## 2021-06-02 RX ADMIN — BUPROPION HYDROCHLORIDE 75 MG: 75 TABLET, FILM COATED ORAL at 11:50

## 2021-06-02 RX ADMIN — PRAVASTATIN SODIUM 40 MG: 40 TABLET ORAL at 08:27

## 2021-06-02 RX ADMIN — INSULIN LISPRO 5 UNITS: 100 INJECTION, SOLUTION INTRAVENOUS; SUBCUTANEOUS at 16:55

## 2021-06-02 RX ADMIN — SODIUM CHLORIDE, PRESERVATIVE FREE 10 ML: 5 INJECTION INTRAVENOUS at 20:41

## 2021-06-02 RX ADMIN — METOPROLOL SUCCINATE 150 MG: 100 TABLET, EXTENDED RELEASE ORAL at 20:41

## 2021-06-02 NOTE — PLAN OF CARE
Goal Outcome Evaluation:  Plan of Care Reviewed With: (P) patient  Progress: (P) no change  Outcome Summary: (P) Pt required Ravi for all bed mobility. Ravi x1 w/ FWW for sit<>stand and to ambulate 40ft. Pt displayed a severely forward-hunched gait. intermittently resting elbows on the FWW during ambulation. DC recs home with 24/7 assist from family and [facility].

## 2021-06-02 NOTE — PROGRESS NOTES
"    Patient Name: Denny Ivy  :8/3/1929  89 y.o.      Patient Care Team:  Мария Wilks MD as PCP - General (Internal Medicine)  Мария Wilks MD as PCP - Internal Medicine    Chief Complaint: follow up hypertension, chronic diastolic heart failure    Interval History:  She is alert. Her blood pressure is elevated. No head ache, blurred vision. No SOA.     Objective   Vital Signs  Temp:  [97.5 °F (36.4 °C)-98 °F (36.7 °C)] 97.8 °F (36.6 °C)  Heart Rate:  [75-81] 81  Resp:  [18-20] 20  BP: (152-216)/(55-90) 192/74    Intake/Output Summary (Last 24 hours) at 2021 1053  Last data filed at 2021 0629  Gross per 24 hour   Intake 730 ml   Output 1100 ml   Net -370 ml     Flowsheet Rows      First Filed Value   Admission Height  154.9 cm (61\") Documented at 2021   Admission Weight  83.8 kg (184 lb 11.2 oz) Documented at 2021          Physical Exam:   General Appearance:   sleeping   Lungs:     Clear to auscultation.  Normal respiratory effort and rate.      Heart:    Regular rhythm and normal rate, normal S1 and S2, no murmurs, gallops or rubs.     Chest Wall:    No abnormalities observed   Abdomen:     Soft, nontender, positive bowel sounds.     Extremities:   no cyanosis, clubbing or edema.  No marked joint deformities.  Adequate musculoskeletal strength.       Results Review:    Results from last 7 days   Lab Units 21  0450   SODIUM mmol/L 142   POTASSIUM mmol/L 4.4   CHLORIDE mmol/L 108*   CO2 mmol/L 26.3   BUN mg/dL 31*   CREATININE mg/dL 0.98   GLUCOSE mg/dL 151*   CALCIUM mg/dL 8.9         Results from last 7 days   Lab Units 21  0450   WBC 10*3/mm3 4.44   HEMOGLOBIN g/dL 10.1*   HEMATOCRIT % 31.4*   PLATELETS 10*3/mm3 160         Results from last 7 days   Lab Units 21  0450   MAGNESIUM mg/dL 2.1                   Medication Review:   aspirin, 81 mg, Oral, Daily  buPROPion, 75 mg, Oral, Q12H  cholecalciferol, 1,000 Units, Oral, Daily  dilTIAZem CD, 120 " mg, Oral, Q24H  enoxaparin, 40 mg, Subcutaneous, Q24H  hydrALAZINE, 100 mg, Oral, Q8H  insulin glargine, 10 Units, Subcutaneous, Nightly  insulin lispro, 0-14 Units, Subcutaneous, TID AC  isosorbide mononitrate, 120 mg, Oral, Daily  metoprolol succinate XL, 150 mg, Oral, Nightly  pravastatin, 40 mg, Oral, Daily  sodium chloride, 10 mL, Intravenous, Q12H  torsemide, 40 mg, Oral, Daily              Assessment/Plan       1.  Dyspnea most likely secondary to acute diastolic heart failure.  2.  Hypertension   3.  Chronic kidney disease  4.  Diabetes  5.  Obstructive sleep apnea.  6.  Acute mental status change    - elevated BP.  Oral hydralazine restarted yesterday. It was increased to home dose of 100 mg TID this morning. Her diuretic was also restarted this morning. Will follow response.  She also has as needed IV labetalol as well.  - mental status improved. IVF off now. Improved oral intake. Labs good.     DENNY Lopez  [facility]  06/02/21  10:53 EDT

## 2021-06-02 NOTE — PROGRESS NOTES
[facility]    PROGRESS NOTE    Name:  Denny Ivy   Age:  89 y.o.  Sex:  female  :  8/3/1929  MRN:  [id number]   Visit Number:  [id number]  Admission Date:  2021  Date Of Service:  21  Primary Care Physician:  Мария Wilks MD     LOS: 2 days :  Patient Care Team:  Мария Wilks MD as PCP - General (Internal Medicine)  Мария Wilks MD as PCP - Internal Medicine:    History taken from:     patient chart    Chief Complaint:      Weakness    Subjective     Interval History:     Patient seen and examined again today.    89-year-old female with type 2 diabetes, hypertension, hypothyroidism, diastolic CHF, chronic kidney disease was discharged from the hospital 2021 after being admitted for congestive heart failure.  She went from 190 pounds down to 180.  She continued not to eat after being discharged and was down to 168 pounds.  Creatinine was up to 1.76.  Patient was admitted placed on IV fluids, diuretics were held.  She was noted to be hypotensive as well.  She was confused.    After patient was given IV fluids her creatinine did come down to 0.98.  Unfortunately her blood pressure has gone up as well.  Increase hydralazine to her home dose of 100 mg 3 times daily, diuretics have been added back by cardiology.  Continue to monitor blood pressure.  Once her blood pressure is stable, anticipate patient can leave.    Patient is much clearer at this point.  She denies any chest pressure, shortness of breath, nausea, vomiting, or pain.    Review of Systems:     All systems were reviewed and negative except for:  Musculoskeletal: positive for  muscle weakness    Objective     Vital Signs:    Temp:  [97.5 °F (36.4 °C)-98 °F (36.7 °C)] 97.7 °F (36.5 °C)  Heart Rate:  [68-81] 68  Resp:  [18-20] 18  BP: (152-216)/(55-90) 189/81    Physical Exam:    General: Alert and oriented x3, mild distress.  Heart: Regular rate and rhythm without murmur rub or thrill.  Lungs:  Decreased breath sounds bilaterally without use of accessory muscles respiration.  Abdomen: Soft/nontender/nondistended.  No HSM noted.  MSK: 4/5 strength in upper/lower extremities bilaterally.     Results Review:      I reviewed the patient's new clinical results.  I reviewed the patient's new imaging results and agree with the interpretation.  I reviewed the patient's other test results and agree with the interpretation    Labs:    Lab Results (last 24 hours)     Procedure Component Value Units Date/Time    POC Glucose Once [[id number]]  (Normal) Collected: 06/02/21 1144    Specimen: Blood Updated: 06/02/21 1146     Glucose 129 mg/dL     Renal Function Panel [[id number]]  (Abnormal) Collected: 06/02/21 0450    Specimen: Blood Updated: 06/02/21 0610     Glucose 151 mg/dL      BUN 31 mg/dL      Creatinine 0.98 mg/dL      Sodium 142 mmol/L      Potassium 4.4 mmol/L      Chloride 108 mmol/L      CO2 26.3 mmol/L      Calcium 8.9 mg/dL      Albumin 3.00 g/dL      Phosphorus 2.8 mg/dL      Anion Gap 7.7 mmol/L      BUN/Creatinine Ratio 31.6     eGFR  African Amer 64 mL/min/1.73     Narrative:      GFR Normal >60  Chronic Kidney Disease <60  Kidney Failure <15      Magnesium [[id number]]  (Normal) Collected: 06/02/21 0450    Specimen: Blood Updated: 06/02/21 0610     Magnesium 2.1 mg/dL     POC Glucose Once [[id number]]  (Abnormal) Collected: 06/02/21 0558    Specimen: Blood Updated: 06/02/21 0600     Glucose 169 mg/dL     CBC (No Diff) [[id number]]  (Abnormal) Collected: 06/02/21 0450    Specimen: Blood Updated: 06/02/21 0542     WBC 4.44 10*3/mm3      RBC 3.29 10*6/mm3      Hemoglobin 10.1 g/dL      Hematocrit 31.4 %      MCV 95.4 fL      MCH 30.7 pg      MCHC 32.2 g/dL      RDW 13.0 %      RDW-SD 45.9 fl      MPV 10.6 fL      Platelets 160 10*3/mm3     POC Glucose Once [[id number]]  (Abnormal) Collected: 06/01/21 2115    Specimen: Blood Updated: 06/01/21 2116     Glucose 246 mg/dL            Radiology:    Imaging Results  (Last 24 Hours)     ** No results found for the last 24 hours. **          Medication Review:     aspirin, 81 mg, Oral, Daily  buPROPion, 75 mg, Oral, Q12H  cholecalciferol, 1,000 Units, Oral, Daily  dilTIAZem CD, 120 mg, Oral, Q24H  enoxaparin, 40 mg, Subcutaneous, Q24H  hydrALAZINE, 100 mg, Oral, Q8H  insulin glargine, 10 Units, Subcutaneous, Nightly  insulin lispro, 0-14 Units, Subcutaneous, TID AC  isosorbide mononitrate, 120 mg, Oral, Daily  metoprolol succinate XL, 150 mg, Oral, Nightly  pravastatin, 40 mg, Oral, Daily  sodium chloride, 10 mL, Intravenous, Q12H  torsemide, 40 mg, Oral, Daily             Assessment/Plan     Principal Problem:    Generalized weakness  Active Problems:    PRABHU treated with autoBiPAP    HTN (hypertension)    Type 2 diabetes mellitus, with long-term current use of insulin (CMS/[facility])    CKD (chronic kidney disease) stage 3, GFR 30-59 ml/min (CMS/[facility])    COLLIN (acute kidney injury) (CMS/[facility])    Hypotension    Weight loss    Anemia    Hyperkalemia    Chronic diastolic CHF (congestive heart failure) (CMS/[facility])      1.  Hypotension, likely due to overdiuresis.  This is resolved.  2.  Hypertension, uncontrolled, continue to monitor.  3.  Diabetes mellitus type 2  4.  Chronic kidney disease stage III  5.  PRABHU  6.  Metabolic encephalopathy, likely due to low blood pressure, resolved.    Plan:    Increased hydralazine.  Diuretics added.  Monitor blood pressure.  Monitor lab work.  Monitor blood sugars.  Cardiology following.  Lovenox added for DVT prophylaxis.  PT working with patient.  Anticipate home once blood pressure is controlled.  Further recommendations will depend on the clinical course.    Geoffrey Potts,   06/02/21  13:30 EDT

## 2021-06-02 NOTE — PLAN OF CARE
Problem: Adult Inpatient Plan of Care  Goal: Plan of Care Review  Flowsheets (Taken 6/2/2021 1645)  Progress: improving  Plan of Care Reviewed With: patient   Goal Outcome Evaluation:  Plan of Care Reviewed With: patient  Progress: improving       Pt blood pressures are slightly better today but still needing PRN IV Labetalol for SBP over 180. Pt worked with PT/OT. Pt did refuse Lovenox today, she said she didn't want to get an infection for the puncture site. Pt was educated and she still refused. Pt can be cantankerous when things don't go her way or when she doesn't want to do something. Other than hypertension VSS, If blood pressures are more stable through the night and tomorrow pt may discharge home. Will continue to monitor.

## 2021-06-02 NOTE — THERAPY TREATMENT NOTE
Patient Name: Denny Ivy  : 8/3/1929    MRN: [id number]                              Today's Date: 2021       Admit Date: 2021    Visit Dx:     ICD-10-CM ICD-9-CM   1. Generalized weakness  R53.1 780.79   2. Acute renal failure, unspecified acute renal failure type (CMS/[facility])  N17.9 584.9   3. Hyperkalemia  E87.5 276.7     Patient Active Problem List   Diagnosis   • PRABHU treated with autoBiPAP   • Hypersomnia due to medical condition   • Chronic congestive heart failure (CMS/[facility])   • HTN (hypertension)   • Type 2 diabetes mellitus, with long-term current use of insulin (CMS/[facility])   • CKD (chronic kidney disease) stage 3, GFR 30-59 ml/min (CMS/[facility])   • COLLIN (acute kidney injury) (CMS/[facility])   • Generalized weakness   • Hypotension   • Weight loss   • Anemia   • Hyperkalemia   • Chronic diastolic CHF (congestive heart failure) (CMS/[facility])     Past Medical History:   Diagnosis Date   • Arthritis    • Diabetes 1.5, managed as type 2 (CMS/[facility])    • Edema    • Hypertension    • Hypothyroidism    • Low back pain    • Macular degeneration      Past Surgical History:   Procedure Laterality Date   • CARDIAC SURGERY     • CHOLECYSTECTOMY     • HIATAL HERNIA REPAIR     • HYSTERECTOMY       General Information     Row Name 21 1609          Physical Therapy Time and Intention    Document Type  therapy note (daily note)  (Pended)   -DF     Mode of Treatment  individual therapy;physical therapy  (Pended)   -DF     Row Name 21 1609          General Information    Existing Precautions/Restrictions  fall  (Pended)   -DF     Row Name 21 1609          Safety Issues, Functional Mobility    Safety Issues Affecting Function (Mobility)  impulsivity;judgment;steps too close to assistive device;sequencing abilities  (Pended)   -DF     Impairments Affecting Function (Mobility)  balance;strength;coordination  (Pended)   -DF       User Key  (r) = Recorded By, (t) = Taken By, (c) = Cosigned By    Initials Name  Provider Type    Dereje Guzman, PT Student PT Student        Mobility     Row Name 06/02/21 1611          Bed Mobility    Bed Mobility  bed mobility (all) activities  (Pended)   -DF     Supine-Sit-Supine [place] (Bed Mobility)  minimum assist (75% patient effort);1 person assist  (Pended)   -DF     Assistive Device (Bed Mobility)  head of bed elevated  (Pended)   -DF     Row Name 06/02/21 1611          Bed-Chair Transfer    Bed-Chair [place] (Transfers)  not tested  (Pended)   -DF     Row Name 06/02/21 1611          Sit-Stand Transfer    Sit-Stand [place] (Transfers)  minimum assist (75% patient effort);1 person assist  (Pended)   -DF     Assistive Device (Sit-Stand Transfers)  walker, front-wheeled  (Pended)   -DF     Row Name 06/02/21 1611          Gait/Stairs (Locomotion)    Upson Level (Gait)  minimum assist (75% patient effort);1 person assist  (Pended)   -DF     Assistive Device (Gait)  walker, front-wheeled  (Pended)   -DF     Distance in Feet (Gait)  Pt demonstrates a severely slumped posture, leaning over the FWW as she ambulates. intermittently resting elbows on FWW. requires guidance of walker.  (Pended)   -DF     Deviations/Abnormal Patterns (Gait)  lenny decreased;gait speed decreased;weight shifting decreased;stride length decreased  (Pended)   -DF     Bilateral Gait Deviations  weight shift ability decreased  (Pended)   -DF     Upson Level (Stairs)  not tested  (Pended)   -DF       User Key  (r) = Recorded By, (t) = Taken By, (c) = Cosigned By    Initials Name Provider Type    Dereje Guzman, PT Student PT Student        Obj/Interventions    No documentation.       Goals/Plan    No documentation.       Clinical Impression     Row Name 06/02/21 1613          Pain Scale: Numbers Pre/Post-Treatment    Pretreatment Pain Rating  0/10 - no pain  (Pended)   -DF     Posttreatment Pain Rating  0/10 - no pain  (Pended)   -DF     Row Name 06/02/21 1613          Plan of Care  Review    Plan of Care Reviewed With  patient  (Pended)   -DF     Progress  no change  (Pended)   -DF     Outcome Summary  Pt required Ravi for all bed mobility. Ravi x1 w/ FWW for sit<>stand and to ambulate 40ft. Pt displayed a severely forward-hunched gait. intermittently resting elbows on the FWW during ambulation. DC recs home with 24/7 assist from family and [facility].  (Pended)   -DF     Row Name 06/02/21 1613          Therapy Assessment/Plan (PT)    Criteria for Skilled Interventions Met (PT)  meets criteria  (Pended)   -DF     Row Name 06/02/21 1613          Positioning and Restraints    Pre-Treatment Position  in bed  (Pended)   -DF     Post Treatment Position  bed  (Pended)   -DF     In Bed  supine;call light within reach;exit alarm on;encouraged to call for assist;side rails up x2  (Pended)   -DF       User Key  (r) = Recorded By, (t) = Taken By, (c) = Cosigned By    Initials Name Provider Type    Dereje Guzman, PT Student PT Student        Outcome Measures     Row Name 06/02/21 1621          How much help from another person do you currently need...    Turning from your back to your side while in flat bed without using bedrails?  3  (Pended)   -DF     Moving from lying on back to sitting on the side of a flat bed without bedrails?  2  (Pended)   -DF     Moving to and from a bed to a chair (including a wheelchair)?  3  (Pended)   -DF     Standing up from a chair using your arms (e.g., wheelchair, bedside chair)?  3  (Pended)   -DF     Climbing 3-5 steps with a railing?  2  (Pended)   -DF     To walk in hospital room?  2  (Pended)   -DF     AM-PAC 6 Clicks Score (PT)  15  (Pended)   -DF       User Key  (r) = Recorded By, (t) = Taken By, (c) = Cosigned By    Initials Name Provider Type    Dereje Guzman, PT Student PT Student        Physical Therapy Education                 Title: PT OT SLP Therapies (Done)     Topic: Physical Therapy (Done)     Point: Mobility training (Done)     Learning Progress  Summary           Patient Acceptance, E,D, DU,NR by  at 6/1/2021 1500    Acceptance, E, VU by  at 5/31/2021 1444                   Point: Home exercise program (Done)     Learning Progress Summary           Patient Acceptance, E, VU by  at 5/31/2021 1444                   Point: Body mechanics (Done)     Learning Progress Summary           Patient Acceptance, E,D, DU,NR by  at 6/1/2021 1500    Acceptance, E, VU by  at 5/31/2021 1444                   Point: Precautions (Done)     Learning Progress Summary           Patient Acceptance, E,D, DU,NR by  at 6/1/2021 1500    Acceptance, E, VU by  at 5/31/2021 1444                               User Key     Initials Effective Dates Name Provider Type Discipline     04/03/18 -  Anum Zazueta PT Physical Therapist PT     04/03/18 -  Semaj España, PT Physical Therapist PT              PT Recommendation and Plan     Plan of Care Reviewed With: (P) patient  Progress: (P) no change  Outcome Summary: (P) Pt required Ravi for all bed mobility. Ravi x1 w/ FWW for sit<>stand and to ambulate 40ft. Pt displayed a severely forward-hunched gait. intermittently resting elbows on the FWW during ambulation. DC recs home with 24/7 assist from family and [facility].     Time Calculation:   PT Charges     Row Name 06/02/21 1621             Time Calculation    Start Time  1526  (Pended)   -DF      Stop Time  1544  (Pended)   -DF      Time Calculation (min)  18 min  (Pended)   -DF      PT Received On  06/02/21  (Pended)   -DF      PT - Next Appointment  06/03/21  (Pended)   -DF         Time Calculation- PT    Total Timed Code Minutes- PT  18 minute(s)  (Pended)   -DF        User Key  (r) = Recorded By, (t) = Taken By, (c) = Cosigned By    Initials Name Provider Type    DF Dereje Hale, PT Student PT Student        Therapy Charges for Today     Code Description Service Date Service Provider Modifiers Qty    [id number] HC GAIT TRAINING EA 15 MIN 6/2/2021 Dereje Hale, PT  Student GP 1          PT G-Codes  Outcome Measure Options: AM-PAC 6 Clicks Basic Mobility (PT)  AM-PAC 6 Clicks Score (PT): (P) 15    Dereje Hale, PT Student  6/2/2021

## 2021-06-03 ENCOUNTER — APPOINTMENT (OUTPATIENT)
Dept: CT IMAGING | Facility: HOSPITAL | Age: 86
End: 2021-06-03

## 2021-06-03 LAB
ALBUMIN SERPL-MCNC: 3.7 G/DL (ref 3.5–5.2)
ANION GAP SERPL CALCULATED.3IONS-SCNC: 9.5 MMOL/L (ref 5–15)
BUN SERPL-MCNC: 29 MG/DL (ref 8–23)
BUN/CREAT SERPL: 29.3 (ref 7–25)
CALCIUM SPEC-SCNC: 9.6 MG/DL (ref 8.2–9.6)
CHLORIDE SERPL-SCNC: 105 MMOL/L (ref 98–107)
CO2 SERPL-SCNC: 29.5 MMOL/L (ref 22–29)
CREAT SERPL-MCNC: 0.99 MG/DL (ref 0.57–1)
GFR SERPL CREATININE-BSD FRML MDRD: 64 ML/MIN/1.73
GLUCOSE BLDC GLUCOMTR-MCNC: 117 MG/DL (ref 70–130)
GLUCOSE BLDC GLUCOMTR-MCNC: 135 MG/DL (ref 70–130)
GLUCOSE BLDC GLUCOMTR-MCNC: 177 MG/DL (ref 70–130)
GLUCOSE BLDC GLUCOMTR-MCNC: 200 MG/DL (ref 70–130)
GLUCOSE SERPL-MCNC: 121 MG/DL (ref 65–99)
MAGNESIUM SERPL-MCNC: 1.9 MG/DL (ref 1.7–2.3)
PHOSPHATE SERPL-MCNC: 2.8 MG/DL (ref 2.5–4.5)
POTASSIUM SERPL-SCNC: 4.2 MMOL/L (ref 3.5–5.2)
SODIUM SERPL-SCNC: 144 MMOL/L (ref 136–145)

## 2021-06-03 PROCEDURE — 82962 GLUCOSE BLOOD TEST: CPT

## 2021-06-03 PROCEDURE — 83735 ASSAY OF MAGNESIUM: CPT | Performed by: INTERNAL MEDICINE

## 2021-06-03 PROCEDURE — 94640 AIRWAY INHALATION TREATMENT: CPT

## 2021-06-03 PROCEDURE — 25010000002 ENOXAPARIN PER 10 MG: Performed by: INTERNAL MEDICINE

## 2021-06-03 PROCEDURE — 80069 RENAL FUNCTION PANEL: CPT | Performed by: INTERNAL MEDICINE

## 2021-06-03 PROCEDURE — 94799 UNLISTED PULMONARY SVC/PX: CPT

## 2021-06-03 PROCEDURE — 63710000001 INSULIN GLARGINE PER 5 UNITS: Performed by: NURSE PRACTITIONER

## 2021-06-03 PROCEDURE — 70450 CT HEAD/BRAIN W/O DYE: CPT

## 2021-06-03 PROCEDURE — 99232 SBSQ HOSP IP/OBS MODERATE 35: CPT | Performed by: NURSE PRACTITIONER

## 2021-06-03 PROCEDURE — 63710000001 INSULIN LISPRO (HUMAN) PER 5 UNITS: Performed by: NURSE PRACTITIONER

## 2021-06-03 PROCEDURE — 99221 1ST HOSP IP/OBS SF/LOW 40: CPT | Performed by: PSYCHIATRY & NEUROLOGY

## 2021-06-03 RX ORDER — ATORVASTATIN CALCIUM 80 MG/1
80 TABLET, FILM COATED ORAL NIGHTLY
Status: DISCONTINUED | OUTPATIENT
Start: 2021-06-03 | End: 2021-06-07

## 2021-06-03 RX ORDER — IPRATROPIUM BROMIDE AND ALBUTEROL SULFATE 2.5; .5 MG/3ML; MG/3ML
3 SOLUTION RESPIRATORY (INHALATION) EVERY 4 HOURS PRN
Status: DISCONTINUED | OUTPATIENT
Start: 2021-06-03 | End: 2021-06-08 | Stop reason: HOSPADM

## 2021-06-03 RX ORDER — CLOPIDOGREL BISULFATE 75 MG/1
75 TABLET ORAL DAILY
Status: DISCONTINUED | OUTPATIENT
Start: 2021-06-04 | End: 2021-06-08 | Stop reason: HOSPADM

## 2021-06-03 RX ORDER — CLOPIDOGREL BISULFATE 75 MG/1
300 TABLET ORAL ONCE
Status: COMPLETED | OUTPATIENT
Start: 2021-06-03 | End: 2021-06-03

## 2021-06-03 RX ORDER — DILTIAZEM HYDROCHLORIDE 240 MG/1
240 CAPSULE, COATED, EXTENDED RELEASE ORAL
Status: DISCONTINUED | OUTPATIENT
Start: 2021-06-03 | End: 2021-06-04

## 2021-06-03 RX ORDER — DILTIAZEM HYDROCHLORIDE 240 MG/1
240 CAPSULE, COATED, EXTENDED RELEASE ORAL
Status: DISCONTINUED | OUTPATIENT
Start: 2021-06-04 | End: 2021-06-03

## 2021-06-03 RX ORDER — SODIUM CHLORIDE 0.9 % (FLUSH) 0.9 %
10 SYRINGE (ML) INJECTION AS NEEDED
Status: DISCONTINUED | OUTPATIENT
Start: 2021-06-03 | End: 2021-06-08 | Stop reason: HOSPADM

## 2021-06-03 RX ORDER — ASPIRIN 300 MG/1
300 SUPPOSITORY RECTAL DAILY
Status: DISCONTINUED | OUTPATIENT
Start: 2021-06-04 | End: 2021-06-07

## 2021-06-03 RX ORDER — SODIUM CHLORIDE 0.9 % (FLUSH) 0.9 %
10 SYRINGE (ML) INJECTION EVERY 12 HOURS SCHEDULED
Status: DISCONTINUED | OUTPATIENT
Start: 2021-06-03 | End: 2021-06-08 | Stop reason: HOSPADM

## 2021-06-03 RX ORDER — ASPIRIN 325 MG
325 TABLET ORAL DAILY
Status: DISCONTINUED | OUTPATIENT
Start: 2021-06-04 | End: 2021-06-07

## 2021-06-03 RX ADMIN — HYDRALAZINE HYDROCHLORIDE 100 MG: 50 TABLET, FILM COATED ORAL at 21:29

## 2021-06-03 RX ADMIN — METOPROLOL SUCCINATE 150 MG: 100 TABLET, EXTENDED RELEASE ORAL at 21:29

## 2021-06-03 RX ADMIN — PRAVASTATIN SODIUM 40 MG: 40 TABLET ORAL at 11:33

## 2021-06-03 RX ADMIN — HYDRALAZINE HYDROCHLORIDE 100 MG: 50 TABLET, FILM COATED ORAL at 05:22

## 2021-06-03 RX ADMIN — BUPROPION HYDROCHLORIDE 75 MG: 75 TABLET, FILM COATED ORAL at 21:29

## 2021-06-03 RX ADMIN — LABETALOL HYDROCHLORIDE 10 MG: 5 INJECTION, SOLUTION INTRAVENOUS at 12:09

## 2021-06-03 RX ADMIN — DILTIAZEM HYDROCHLORIDE 240 MG: 240 CAPSULE, COATED, EXTENDED RELEASE ORAL at 11:32

## 2021-06-03 RX ADMIN — BUPROPION HYDROCHLORIDE 75 MG: 75 TABLET, FILM COATED ORAL at 11:32

## 2021-06-03 RX ADMIN — INSULIN LISPRO 5 UNITS: 100 INJECTION, SOLUTION INTRAVENOUS; SUBCUTANEOUS at 17:19

## 2021-06-03 RX ADMIN — HYDRALAZINE HYDROCHLORIDE 100 MG: 50 TABLET, FILM COATED ORAL at 16:10

## 2021-06-03 RX ADMIN — ISOSORBIDE MONONITRATE 120 MG: 60 TABLET ORAL at 07:16

## 2021-06-03 RX ADMIN — TORSEMIDE 40 MG: 20 TABLET ORAL at 11:33

## 2021-06-03 RX ADMIN — ATORVASTATIN CALCIUM 80 MG: 80 TABLET, FILM COATED ORAL at 21:28

## 2021-06-03 RX ADMIN — LABETALOL HYDROCHLORIDE 10 MG: 5 INJECTION, SOLUTION INTRAVENOUS at 06:32

## 2021-06-03 RX ADMIN — ASPIRIN 81 MG: 81 TABLET, COATED ORAL at 11:32

## 2021-06-03 RX ADMIN — Medication 1000 UNITS: at 11:32

## 2021-06-03 RX ADMIN — IPRATROPIUM BROMIDE AND ALBUTEROL SULFATE 3 ML: 2.5; .5 SOLUTION RESPIRATORY (INHALATION) at 07:07

## 2021-06-03 RX ADMIN — SODIUM CHLORIDE, PRESERVATIVE FREE 10 ML: 5 INJECTION INTRAVENOUS at 11:33

## 2021-06-03 RX ADMIN — INSULIN GLARGINE 10 UNITS: 100 INJECTION, SOLUTION SUBCUTANEOUS at 21:29

## 2021-06-03 RX ADMIN — CLOPIDOGREL 300 MG: 75 TABLET, FILM COATED ORAL at 22:43

## 2021-06-03 NOTE — PLAN OF CARE
Goal Outcome Evaluation:  BP elevated during the night, but came down after giving PO hydralazine. No c/o pain. Purwick in place for strict I&O. Will continue to monitor.

## 2021-06-03 NOTE — PROGRESS NOTES
"    Patient Name: Denny Ivy  :8/3/1929  89 y.o.      Patient Care Team:  Мария Wilks MD as PCP - General (Internal Medicine)  Мария Wilks MD as PCP - Internal Medicine    Chief Complaint: follow up hypertension, chronic diastolic heart failure    Interval History:  She is sitting up in the chair. Didn't eat much breakfast. She says she doesn't feel like answering a bunch of questions.     Objective   Vital Signs  Temp:  [97.7 °F (36.5 °C)-98.2 °F (36.8 °C)] 97.7 °F (36.5 °C)  Heart Rate:  [69-80] 78  Resp:  [16-18] 18  BP: (162-231)/() 165/79    Intake/Output Summary (Last 24 hours) at 6/3/2021 1102  Last data filed at 6/3/2021 0519  Gross per 24 hour   Intake 240 ml   Output 1150 ml   Net -910 ml     Flowsheet Rows      First Filed Value   Admission Height  154.9 cm (61\") Documented at 2021   Admission Weight  83.8 kg (184 lb 11.2 oz) Documented at 2021          Physical Exam:   General Appearance:   awake. Up in chair.    Lungs:     Clear to auscultation.  Normal respiratory effort and rate.      Heart:    Regular rhythm and normal rate, normal S1 and S2, no murmurs, gallops or rubs.     Chest Wall:    No abnormalities observed   Abdomen:     Soft, nontender, positive bowel sounds.     Extremities:   no cyanosis, clubbing or edema.  No marked joint deformities.  Adequate musculoskeletal strength.       Results Review:    Results from last 7 days   Lab Units 21  0545   SODIUM mmol/L 144   POTASSIUM mmol/L 4.2   CHLORIDE mmol/L 105   CO2 mmol/L 29.5*   BUN mg/dL 29*   CREATININE mg/dL 0.99   GLUCOSE mg/dL 121*   CALCIUM mg/dL 9.6         Results from last 7 days   Lab Units 21  0450   WBC 10*3/mm3 4.44   HEMOGLOBIN g/dL 10.1*   HEMATOCRIT % 31.4*   PLATELETS 10*3/mm3 160         Results from last 7 days   Lab Units 21  0545   MAGNESIUM mg/dL 1.9                   Medication Review:   aspirin, 81 mg, Oral, Daily  buPROPion, 75 mg, Oral, " Q12H  cholecalciferol, 1,000 Units, Oral, Daily  dilTIAZem CD, 240 mg, Oral, Q24H  enoxaparin, 40 mg, Subcutaneous, Q24H  hydrALAZINE, 100 mg, Oral, Q8H  insulin glargine, 10 Units, Subcutaneous, Nightly  insulin lispro, 0-14 Units, Subcutaneous, TID AC  isosorbide mononitrate, 120 mg, Oral, Daily  metoprolol succinate XL, 150 mg, Oral, Nightly  pravastatin, 40 mg, Oral, Daily  sodium chloride, 10 mL, Intravenous, Q12H  torsemide, 40 mg, Oral, Daily              Assessment/Plan       1.  Dyspnea most likely secondary to acute diastolic heart failure.  2.  Hypertension   3.  Chronic kidney disease  4.  Diabetes  5.  Obstructive sleep apnea.  6.  Acute mental status change    - torsemide held on admission due to decreased oral intake. Restarted yesterday.   - she was initially hypotensive. This has resolved and she is back on her home regimen and BP remains uncontrolled. Will increase CCB today. She has received several doses of IV labetalol in addition to scheduled meds.     DENNY Lopez  [facility]  06/03/21  11:02 EDT

## 2021-06-03 NOTE — CONSULTS
Neurology Note    Patient:  Denny Ivy    YOB: 1929    REFERRING PHYSICIAN:  Alfredito Vázquez MD    CHIEF COMPLAINT:    AMS    HISTORY OF PRESENT ILLNESS:   The patient is a 89 y.o. female with DM, HTN, h/o CHF, admitted on 5/30 with generalized weakness, COLLIN, recent admission for CHF and HTN, on low dose aspirin. Today around noon she had a sudden AMS, was staring and not responding, not moving her right arm while sitting up in a chair this am. Her BP has been running high since this am. Rapid response was called, initial NIHSS was 13, the patient gradually became more responsive and moving her limbs. Stat CT head negative for acute changes. Currently back to baseline. No definite seizure activity or h/o a stroke. Last /85.    Past Medical History:  Past Medical History:   Diagnosis Date   • Arthritis    • Diabetes 1.5, managed as type 2 (CMS/[facility])    • Edema    • Hypertension    • Hypothyroidism    • Low back pain    • Macular degeneration        Past Surgical History:  Past Surgical History:   Procedure Laterality Date   • CARDIAC SURGERY     • CHOLECYSTECTOMY     • HIATAL HERNIA REPAIR     • HYSTERECTOMY         Social History:   Social History     Socioeconomic History   • Marital status:      Spouse name: Not on file   • Number of children: Not on file   • Years of education: Not on file   • Highest education level: Not on file   Tobacco Use   • Smoking status: Never Smoker   • Smokeless tobacco: Never Used   Substance and Sexual Activity   • Alcohol use: No   • Drug use: No   • Sexual activity: Defer        Family History:   History reviewed. No pertinent family history.    Medications Prior to Admission:    Prior to Admission medications    Medication Sig Start Date End Date Taking? Authorizing Provider   aspirin 81 MG EC tablet Take 81 mg by mouth daily.   Yes Provider, MD Gaviota   buPROPion (WELLBUTRIN) 75 MG tablet Take 75 mg by mouth 2 (two) times a day.   Yes  Gaviota Conway MD   diltiazem (TIAZAC) 120 MG 24 hr capsule Take 120 mg by mouth daily.   Yes Gaviota Conway MD   glucose blood (Accu-Chek Harriet Plus) test strip CBS three times daily 5/4/21  Yes Gaviota Conway MD   hydrALAZINE (APRESOLINE) 100 MG tablet Take 1 tablet by mouth 3 (Three) Times a Day. 5/28/21  Yes Alfredito Vázquez MD   HYDROcodone-acetaminophen (NORCO) 5-325 MG per tablet Take 1 tablet by mouth 4 (four) times a day as needed for severe pain (7-10). 8/4/16  Yes Danay Vides APRN   insulin glargine (LANTUS) 100 UNIT/ML injection Inject 22 Units under the skin into the appropriate area as directed Every Night.   Yes Gaviota Conway MD   insulin lispro (HumaLOG) 100 UNIT/ML injection Inject  under the skin into the appropriate area as directed 3 (Three) Times a Day Before Meals. Sliding scale   Yes Gaviota Conway MD   Insulin Pen Needle (B-D UF III MINI PEN NEEDLES) 31G X 5 MM misc 400 each by Other route. 4/20/21  Yes Gaviota Conway MD   isosorbide mononitrate (IMDUR) 120 MG 24 hr tablet Take 1 tablet by mouth Daily. 5/29/21  Yes Alfredito Vázquez MD   meclizine (ANTIVERT) 25 MG tablet Take 1 tablet by mouth 3 (Three) Times a Day As Needed for dizziness. 7/6/18  Yes Tone Christiansen MD   metoprolol succinate XL (TOPROL-XL) 50 MG 24 hr tablet Take 3 tablets by mouth Every Night. 5/28/21  Yes Alfredito Vázquez MD   ondansetron ODT (ZOFRAN-ODT) 4 MG disintegrating tablet Take 1 tablet by mouth Every 6 (Six) Hours As Needed for Nausea. 7/6/18  Yes Tone Christiansen MD   pravastatin (PRAVACHOL) 40 MG tablet Take 40 mg by mouth daily.   Yes Gaviota Conway MD   torsemide (DEMADEX) 20 MG tablet Take 2 tablets by mouth Daily. 5/29/21  Yes Alfredito Vázquez MD       Allergies:  Vasotec [enalapril], Atacand hct [candesartan cilexetil-hctz], Calan [verapamil], Glucophage [metformin hcl], Hytrin [terazosin], Metolazone, Norvasc [amlodipine besylate], and  Thiazide-type diuretics      Review of system  Review of Systems   Unable to perform ROS: Mental status change       Vitals:    06/03/21 1208   BP: 173/85   Pulse: 77   Resp:    Temp:    SpO2: 96%       Physical exam  Physical Exam  Constitutional:       Appearance: She is well-developed.   HENT:      Head: Normocephalic and atraumatic.   Cardiovascular:      Rate and Rhythm: Normal rate and regular rhythm.   Pulmonary:      Effort: Pulmonary effort is normal.   Neurological:      General: No focal deficit present.      Mental Status: She is alert and oriented to person, place, and time.      Deep Tendon Reflexes: Reflexes are normal and symmetric. Babinski sign absent on the right side. Babinski sign absent on the left side.      Comments: Speech sparse but fairly clear, able to name and repeat, VFF, no facial droop, moves limbs against gravity.   Psychiatric:         Behavior: Behavior normal.         Thought Content: Thought content normal.           Lab Results   Component Value Date    WBC 4.44 06/02/2021    HGB 10.1 (L) 06/02/2021    HCT 31.4 (L) 06/02/2021    MCV 95.4 06/02/2021     06/02/2021     Lab Results   Component Value Date    GLUCOSE 121 (H) 06/03/2021    BUN 29 (H) 06/03/2021    CREATININE 0.99 06/03/2021    EGFRIFAFRI 64 06/03/2021    BCR 29.3 (H) 06/03/2021    CO2 29.5 (H) 06/03/2021    CALCIUM 9.6 06/03/2021    ALBUMIN 3.70 06/03/2021    AST 12 06/01/2021    ALT 20 06/01/2021     Vitamin B-12   211 - 946 pg/mL 1,031High         ECG 12 Lead  Order: [id number]  Status:  Final result   Visible to patient:  No (not released) Next appt:  None  Component   Ref Range & Units 5/22/21 1741   QT Interval   ms 448       Narrative & Impression    HEART RATE= 71  bpm  RR Interval= 848  ms  IN Interval= 194  ms  P Horizontal Axis= 1  deg  P Front Axis= 61  deg  QRSD Interval= 133  ms  QT Interval= 448  ms  QRS Axis= 46  deg  T Wave Axis= 22  deg  - ABNORMAL ECG -  Sinus rhythm  Right bundle branch  block  NO SIGNIFICANT CHANGE FROM PREVIOUS ECG  Electronically Signed By: Josephine Puente ([facility]) 23-May-2021 14:52:14  Date and Time of Study: 2021 17:41:36      Specimen Collected: 21 17:41         Echo Complete w/Doppler and Color Flow  Order# [id number]  Reading physician: Dimitrios Petit III, MD Ordering physician: Dara Knight MD Study date: 21   Patient Information    Patient Name   Denny Ivy MRN   [id number] Legal Sex   Female  (Age)   8/3/1929 (89 y.o.)   PACS Images     Show images for Adult Transthoracic Echo Complete W/ Cont if Necessary Per Protocol    Sedation Narrator Report    Sedation Narrator Report      Interpretation Summary    · Estimated right ventricular systolic pressure from tricuspid regurgitation is mildly elevated (35-45 mmHg). Calculated right ventricular systolic pressure from tricuspid regurgitation is 42 mmHg.  · Estimated left ventricular EF = 65% Left ventricular systolic function is normal.  · Left ventricular diastolic function was indeterminate.              Radiological Studies:    Adult Transthoracic Echo Complete W/ Cont if Necessary Per Protocol    Result Date: 2021  · Estimated right ventricular systolic pressure from tricuspid regurgitation is mildly elevated (35-45 mmHg). Calculated right ventricular systolic pressure from tricuspid regurgitation is 42 mmHg. · Estimated left ventricular EF = 65% Left ventricular systolic function is normal. · Left ventricular diastolic function was indeterminate.    Echocardiogram Findings    Left Ventricle Calculated left ventricular EF = 63% Estimated left ventricular EF = 65% Left ventricular systolic function is normal.   Septal wall motion is normal. Normal left ventricular cavity size and wall thickness noted. All left ventricular wall segments contract normally. Left ventricular diastolic function was indeterminate. Unable to assess left atrial pressure.   Right Ventricle Normal right  ventricular cavity size, wall thickness, systolic function and septal motion noted.   Left Atrium Normal left atrial size and volume noted.   Right Atrium Normal right atrial cavity size noted. The inferior vena cava is dilated. Partial IVC inspiratory collapse of less than 50% noted.   Aortic Valve No aortic valve regurgitation or stenosis is present. The aortic valve is grossly normal in structure.   Mitral Valve Mitral annular calcification is present. Trace to mild mitral valve regurgitation is present. No significant mitral valve stenosis is present.   Tricuspid Valve The tricuspid valve is structurally normal with no significant stenosis present. Trace to mild tricuspid valve regurgitation is present. Estimated right ventricular systolic pressure from tricuspid regurgitation is mildly elevated (35-45 mmHg). Calculated right ventricular systolic pressure from tricuspid regurgitation is 42 mmHg.   Pulmonic Valve The pulmonic valve is not well visualized. The pulmonic valve is grossly normal in structure. No pulmonic valve regurgitation or significant stenosis is present.   Greater Vessels No dilation of the aortic root is present.   Pericardium The pericardium is normal. There is no evidence of pericardial effusion. .       XR Foot 3+ View Right    Result Date: 5/28/2021  THREE-VIEW RIGHT FOOT  HISTORY: Diabetes. Right foot pain.  FINDINGS: There is severe diffuse osteoporosis. There are prominent degenerative changes involving the subtalar joints as well as pes planus deformity. There is no discrete bony destruction are periosteal reaction to suggest osteomyelitis.  This report was finalized on 5/28/2021 4:50 PM by Dr. Mehrdad Carpenter M.D.      XR Chest 1 View    Result Date: 5/22/2021  Patient: DARNELL DONOHUE  Time Out: 19:44 Exam(s): FILM CXR 1 VIEW EXAM:   XR Chest, 1 View CLINICAL HISTORY:    Dyspnea. TECHNIQUE:   Frontal view of the chest. COMPARISON:   Chest x-rays dated 4 11 19 and 8 31 15 FINDINGS:    Lungs: Bilateral prominent interstitial markings.  The lungs are otherwise clear without focal consolidation.   Pleural space:  Unremarkable.  The costophrenic angles are sharp.  No visible pneumothorax.   Heart:  Heart size appears enlarged, however it may be exaggerated by the portable AP exam technique.   Mediastinum:  Unremarkable.   Bones joints:  Severe degenerative changes in the shoulder joints.   Vasculature:  Atherosclerotic calcifications within the aortic arch.   Tubes, lines and devices:  Telemetry leads overlie the thorax. IMPRESSION:     1.  Bilateral prominent interstitial markings.  This is nonspecific and may represent pulmonary vascular congestion or a mild pneumonitis.  2.  Heart size appears enlarged, however it may be exaggerated by the portable AP exam technique.     Electronically signed by Patric Santamaria MD on 05-22-21 at 1944    Duplex Venous Lower Extremity - Left    Result Date: 5/23/2021  · Normal left lower extremity venous duplex scan.          During this visit the following were done:  Labs Reviewed [x]    Labs Ordered []    Radiology Reports Reviewed [x]    Radiology Ordered []    EKG, echo, and/or stress test reviewed []    EEG results reviewed  []    EEG reviewed and interpreted per myself   []    Discussed case with neurointerventionalist or neuroradiologist []    Referring Provider Records Reviewed []    ER Records Reviewed []    Hospital Records Reviewed []    History Obtained From Family []    Radiological images view and Interpreted per myself [x]    Case Discussed with referring provider []     Decision to obtain and request outside records  []        Assessment and Plan     AMS, resolved, suspect left hemispheric TIA in the setting of HTN urgency, less likely partial seizure.   - Observation on telemetry.   - Bed rest.   - MRI brain.   - CTA head and neck.   - EEG.   - Aspirin and statin. Add Plavix.   - -180, DBP<110, okay to normalize if no high grade  stenosis on CTA.     Thanks,              Electronically signed by Nico Raines MD on 6/3/2021 at 13:35 EDT

## 2021-06-03 NOTE — NURSING NOTE
"Patient Name:  Denny Ivy  YOB: 1929  MRN:  [id number]  Admit Date:  5/30/2021    Visit Diagnoses:     ICD-10-CM ICD-9-CM   1. Generalized weakness  R53.1 780.79   2. Acute renal failure, unspecified acute renal failure type (CMS/[facility])  N17.9 584.9   3. Hyperkalemia  E87.5 276.7       Reason For Rapid:   Decreased LOC, right arm neglect: LKN 1030    RN Communicated With:  Dr Hernadez    Rapid Outcome:  To Have stat CTH without contrast. Neurology consult    Communication From Rapid Team:   Responded to rapid for pt \"staring off into space\", not following commands, and flaccid right arm.     Upon my arrival, pt eyes were open and she was tracking RN in room. Her SBP was quite elevated, but appears to have been running high throughout the night.  Primary RN ask to administer PRN meds.     At the start of my assessment pt would make eye contact, but would not answer any questions.  Stimulus to left foot elicited a loud verbal response of \" that foot hurts\", and she attempted to kick me away with both lower extremities.   although she has significant weakness to both LE's.    At that time, I asked pt's caregiver whom was present at bedside, to help with pt assessment and give encouragement to cooperate with exam.  Pt was then more cooperative, but remained irritated \"leave me alone\" and reluctant to follow commands.  All extremities were very weak and she was unable to lift.  Right arm did appeared to be slightly weaker and she was somewhat neglectful to her arm.  Hand was clinched tight and she did cooperate with opening her hand and grasping my fingers.  BUE hand  were equal.  Later after NIH completed her caregiver placed a napkin in pt's right hand and the pt lifted her arm up and towards her nose and mouth without difficulty.  NIH scored 13.  Team D pager activated @ 1203 and Dr Raines updated.  No orders to proceed with team D images.  Stat head CT without contrast ordered and " "neurology consulted.  Primary RN instructed to monitor BP and notify rapid response team with any further concerns.  As I was leaving the bedside pt yelled out \"I'm not crazy\".  Primary RN reassured pt that staff was just concerned for her and wanted to make sure she was alright.        Most Recent Vital Signs  Temp:  [97.7 °F (36.5 °C)-98.2 °F (36.8 °C)] 97.7 °F (36.5 °C)  Heart Rate:  [69-80] 80  Resp:  [16-18] 16  BP: (162-231)/() 197/93  SpO2:  [97 %-100 %] 98 %  on  Flow (L/min):  [2] 2;   Device (Oxygen Therapy): room air    Labs:  Results from last 7 days   Lab Units 05/30/21 2232   COVID19  Not Detected     Glucose   Date/Time Value Ref Range Status   06/03/2021 1130 135 (H) 70 - 130 mg/dL Final   06/03/2021 0722 117 70 - 130 mg/dL Final   06/02/2021 2053 146 (H) 70 - 130 mg/dL Final   06/02/2021 1616 209 (H) 70 - 130 mg/dL Final   06/02/2021 1144 129 70 - 130 mg/dL Final   06/02/2021 0558 169 (H) 70 - 130 mg/dL Final   06/01/2021 2115 246 (H) 70 - 130 mg/dL Final     No results found for: SITE, ALLENTEST, PHART, XHF8DJQ, PO2ART, EYR9JUV, BASEEXCESS, N6YYUVJT, HGBBG, HCTABG, OXYHEMOGLOBI, METHHGBN, CARBOXYHGB, CO2CT, BAROMETRIC, MODALITY, FIO2  Results from last 7 days   Lab Units 06/02/21  0450 06/01/21  0601 05/31/21  0436 05/30/21  2114   WBC 10*3/mm3 4.44 4.22 5.72 6.95   HEMOGLOBIN g/dL 10.1* 9.6* 10.3* 10.7*   PLATELETS 10*3/mm3 160 162 171 203     Results from last 7 days   Lab Units 06/03/21  0545 06/02/21  0450 06/01/21  0601 05/30/21  2114   SODIUM mmol/L 144 142 140 139   POTASSIUM mmol/L 4.2 4.4 4.0 5.6*   CHLORIDE mmol/L 105 108* 108* 101   CO2 mmol/L 29.5* 26.3 28.5 27.9   BUN mg/dL 29* 31* 36* 62*   CREATININE mg/dL 0.99 0.98 1.04* 1.76*   GLUCOSE mg/dL 121* 151* 119* 116*   ALBUMIN g/dL 3.70 3.00* 3.10* 3.70   BILIRUBIN mg/dL  --   --  0.3 0.3   ALK PHOS U/L  --   --  46 55   AST (SGOT) U/L  --   --  12 26   ALT (SGPT) U/L  --   --  20 28   Estimated Creatinine Clearance: 36.1 mL/min " (by C-G formula based on SCr of 0.99 mg/dL).          No results found for: STREPPNEUAG, LEGANTIGENUR        NIH Stroke Scale:   1a. Level of Consciousness: 0-->Alert, keenly responsive  1b. LOC Questions: 1-->Answers one question correctly  1c. LOC Commands: 0-->Performs both tasks correctly  2. Best Gaze: 0-->Normal  3. Visual: 0-->No visual loss  4. Facial Palsy: 0-->Normal symmetrical movements  5a. Motor Arm, Left: 2-->Some effort against gravity, limb cannot get to or maintain (if cued) 90 (or 45) degrees, drifts down to bed, but has some effort against gravity  5b. Motor Arm, Right: 3-->No effort against gravity, limb falls  6a. Motor Leg, Left: 3-->No effort against gravity, leg falls to bed immediately  6b. Motor Leg, Right: 3-->No effort against gravity, leg falls to bed immediately  7. Limb Ataxia: 0-->Absent  8. Sensory: 0-->Normal, no sensory loss  9. Best Language: 0-->No aphasia, normal  10. Dysarthria: 0-->Normal  11. Extinction and Inattention (formerly Neglect): 1-->Visual, tactile, auditory, spatial, or personal inattention or extinction to bilateral simultaneous stimulation in one of the sensory modalities    Total (NIH Stroke Scale): 13    Please refer to full rapid documentation on summary page under Index / Code Timeline

## 2021-06-03 NOTE — NURSING NOTE
Pt symptoms resolved, answering questions appropriately, weakened right arm but able to raise and wipe own mouth, took medications orally without issues, drank water through straw without incidence. Will continue to monitor

## 2021-06-03 NOTE — PROGRESS NOTES
[facility]    [facility]    Name:  Denny Ivy   Age:  89 y.o.  Sex:  female  :  8/3/1929  MRN:  [id number]   Visit Number:  [id number]  Admission Date:  2021  Date Of Service:  21  Primary Care Physician:  Мария Wilks MD     LOS: 3 days :  Patient Care Team:  Мария Wilks MD as PCP - General (Internal Medicine)  Мария Wilks MD as PCP - Internal Medicine:    History taken from:     patient chart    Chief Complaint:      Weakness    Subjective     Interval History:     Patient seen and examined again today.    89-year-old female with type 2 diabetes, hypertension, hypothyroidism, diastolic CHF, chronic kidney disease was discharged from the hospital 2021 after being admitted for congestive heart failure.  She went from 190 pounds down to 180.  She continued not to eat after being discharged and was down to 168 pounds.  Creatinine was up to 1.76.  Creatinine is now normal.  Creatinine 0.99.  Patient was admitted placed on IV fluids, diuretics were held.  She was noted to be hypotensive as well.  She was confused.    After patient was given IV fluids her creatinine did come down to 0.98.  Unfortunately her blood pressure has gone up as well.  Increase hydralazine to her home dose of 100 mg 3 times daily, diuretics have been added back by cardiology.  We will increase her calcium chloride blocker today.  Blood pressure continues to be elevated.    Patient appeared to have some confusion so rapid response was called.  CT of the brain was done and neurology was consulted.  CT of the brain did not show any acute intracranial pathology.  Patient does have a 3 mm osteoma or calcified meningioma which is unchanged.      Daughter was present, states that she has been acting like this ever since her   a month ago.  We will consult access at present.  She denies any chest pressure, shortness of breath, nausea, vomiting, or pain.  She denies any  lateralizing signs or weakness.    Review of Systems:     All systems were reviewed and negative except for:  Musculoskeletal: positive for  muscle weakness    Objective     Vital Signs:    Temp:  [97.7 °F (36.5 °C)-98.7 °F (37.1 °C)] 98.7 °F (37.1 °C)  Heart Rate:  [69-80] 77  Resp:  [16-18] 16  BP: (162-231)/() 173/85    Physical Exam:    General: Alert and oriented x3, mild distress.  Heart: Regular rate and rhythm without murmur rub or thrill.  Lungs: Decreased breath sounds bilaterally without use of accessory muscles respiration.  Abdomen: Soft/nontender/nondistended.  No HSM noted.  MSK: 4/5 strength in upper/lower extremities bilaterally.     Results Review:      I reviewed the patient's new clinical results.  I reviewed the patient's new imaging results and agree with the interpretation.  I reviewed the patient's other test results and agree with the interpretation    Labs:    Lab Results (last 24 hours)     Procedure Component Value Units Date/Time    POC Glucose Once [[id number]]  (Abnormal) Collected: 06/03/21 1605    Specimen: Blood Updated: 06/03/21 1606     Glucose 200 mg/dL     POC Glucose Once [[id number]]  (Abnormal) Collected: 06/03/21 1130    Specimen: Blood Updated: 06/03/21 1141     Glucose 135 mg/dL     Renal Function Panel [[id number]]  (Abnormal) Collected: 06/03/21 0545    Specimen: Blood Updated: 06/03/21 0751     Glucose 121 mg/dL      BUN 29 mg/dL      Creatinine 0.99 mg/dL      Sodium 144 mmol/L      Potassium 4.2 mmol/L      Chloride 105 mmol/L      CO2 29.5 mmol/L      Calcium 9.6 mg/dL      Albumin 3.70 g/dL      Phosphorus 2.8 mg/dL      Anion Gap 9.5 mmol/L      BUN/Creatinine Ratio 29.3     eGFR  African Amer 64 mL/min/1.73     Narrative:      GFR Normal >60  Chronic Kidney Disease <60  Kidney Failure <15      Magnesium [[id number]]  (Normal) Collected: 06/03/21 0545    Specimen: Blood Updated: 06/03/21 0727     Magnesium 1.9 mg/dL     POC Glucose Once [[id number]]  (Normal)  Collected: 06/03/21 0722    Specimen: Blood Updated: 06/03/21 0723     Glucose 117 mg/dL     POC Glucose Once [[id number]]  (Abnormal) Collected: 06/02/21 2053    Specimen: Blood Updated: 06/02/21 2055     Glucose 146 mg/dL            Radiology:    Imaging Results (Last 24 Hours)     Procedure Component Value Units Date/Time    CT Head Without Contrast [[id number]] Collected: 06/03/21 1517     Updated: 06/03/21 1517    Narrative:      CT SCAN HEAD WITHOUT CONTRAST     CLINICAL HISTORY: Less responsive. Right-sided weakness.     CT scan of the head was obtained with 3 mm axial images. No intravenous  contrast was administered.     Comparison is made to previous CT scan of the head dated 07/06/2018.     FINDINGS:     There are moderate changes of chronic small vessel ischemic phenomena.  No significant interval change is seen in these findings. The  ventricles, sulci, and cisterns are age appropriate. The basal ganglia  and thalami are unremarkable in appearance. The posterior fossa  structures are within normal limits. Atherosclerotic changes are seen  within the intracranial vasculature.     Incidental note is made of a 3 mm ossific or densely calcific  extra-axial lesion lateral to the left frontal lobe compatible with  either a densely calcified meningioma or an osteoma. This is unchanged  when compared to the prior head CT dated 07/06/2018.       Impression:         No CT evidence for acute intracranial pathology.      Further evaluation could be performed with MR imaging, as clinically  indicated.     Moderate changes of chronic small vessel ischemic phenomena are again  incidentally noted.     A 3 mm ossific or densely calcific lesion lateral to the left frontal  lobe compatible with either an osteoma or densely calcified meningioma  is unchanged.     Radiation dose reduction techniques were utilized, including automated  exposure control and exposure modulation based on body size.                Medication  Review:     aspirin, 81 mg, Oral, Daily  buPROPion, 75 mg, Oral, Q12H  cholecalciferol, 1,000 Units, Oral, Daily  dilTIAZem CD, 240 mg, Oral, Q24H  enoxaparin, 40 mg, Subcutaneous, Q24H  hydrALAZINE, 100 mg, Oral, Q8H  insulin glargine, 10 Units, Subcutaneous, Nightly  insulin lispro, 0-14 Units, Subcutaneous, TID AC  isosorbide mononitrate, 120 mg, Oral, Daily  metoprolol succinate XL, 150 mg, Oral, Nightly  pravastatin, 40 mg, Oral, Daily  sodium chloride, 10 mL, Intravenous, Q12H  torsemide, 40 mg, Oral, Daily             Assessment/Plan     Principal Problem:    Generalized weakness  Active Problems:    PRABHU treated with autoBiPAP    HTN (hypertension)    Type 2 diabetes mellitus, with long-term current use of insulin (CMS/[facility])    CKD (chronic kidney disease) stage 3, GFR 30-59 ml/min (CMS/[facility])    COLLIN (acute kidney injury) (CMS/[facility])    Hypotension    Weight loss    Anemia    Hyperkalemia    Chronic diastolic CHF (congestive heart failure) (CMS/[facility])      1.  Hypotension, likely due to overdiuresis.  This is resolved.  2.  Hypertension, uncontrolled, continue to monitor.  3.  Diabetes mellitus type 2  4.  Chronic kidney disease stage III  5.  PRABHU  6.  Metabolic encephalopathy, likely due to low blood pressure, resolved.  7.  3 mm left frontal osteoma or meningioma  8.  Brief metabolic encephalopathy, stroke ruled out.  9.  Depression, suspect reactive    Plan:    Consult access.  Continue blood pressure medications per cardiology.  Monitor blood pressure.  Monitor lab work.  Monitor blood sugars.  Neurology now consulted.  Lovenox added for DVT prophylaxis.  PT working with patient.  Anticipate home once blood pressure is controlled.  Further recommendations will depend on the clinical course.    Geoffrey Potts,   06/03/21  16:45 EDT

## 2021-06-03 NOTE — NURSING NOTE
"Family at bedside with concern of sleepiness, Pt arousable to voice, follow commands, answers questions appropriate, pt requires much encouragement to participate in care and answer questions fully. When ask orientation question Where are you? She responded with \"In a bed.\" staff nurse proceeded to ask \"Are you at home.\" she responded \"No, I'm at [facility].\" Family notes that pt usually is a late sleeper. Will continue to monitor.  "

## 2021-06-03 NOTE — NURSING NOTE
Pt found in chair, not following commands, staring off into space, weakened right arm, unable to raise and hold upright. Pt verbally responding to noxious stimilus, nail bed pressure. Staff asked private caregiver to assist as she has been known to be uncooperative with following commands and verbally responding to staff, Pt ask private caregiver to have a seat, encouraged participation with assessment without response.  Rapid response team acivated

## 2021-06-03 NOTE — PLAN OF CARE
Problem: Adult Inpatient Plan of Care  Goal: Plan of Care Review  Outcome: Ongoing, Progressing  Flowsheets (Taken 6/3/2021 1527)  Plan of Care Reviewed With: patient  Outcome Summary: pt remains hypertenstive, rapid response notified r/t decreased loc and weakened right arm, ct completed, mri and cta to be completed, much encouragement needed to particpate in care  Goal: Patient-Specific Goal (Individualized)  Outcome: Ongoing, Progressing  Goal: Absence of Hospital-Acquired Illness or Injury  Outcome: Ongoing, Progressing  Intervention: Identify and Manage Fall Risk  Recent Flowsheet Documentation  Taken 6/3/2021 1430 by Yaima Jovel RN  Safety Promotion/Fall Prevention:   safety round/check completed   room organization consistent   nonskid shoes/slippers when out of bed   lighting adjusted   fall prevention program maintained   clutter free environment maintained   assistive device/personal items within reach  Taken 6/3/2021 1205 by Yaima Jovel RN  Safety Promotion/Fall Prevention:   safety round/check completed   room organization consistent   nonskid shoes/slippers when out of bed   lighting adjusted   fall prevention program maintained   clutter free environment maintained   assistive device/personal items within reach  Taken 6/3/2021 1040 by Yaima Jovel RN  Safety Promotion/Fall Prevention:   safety round/check completed   room organization consistent   nonskid shoes/slippers when out of bed   lighting adjusted   fall prevention program maintained   clutter free environment maintained   assistive device/personal items within reach  Taken 6/3/2021 0855 by Yaima Jovel RN  Safety Promotion/Fall Prevention:   safety round/check completed   room organization consistent   nonskid shoes/slippers when out of bed   lighting adjusted   fall prevention program maintained   clutter free environment maintained   assistive device/personal items within reach  Intervention: Prevent Skin  Injury  Recent Flowsheet Documentation  Taken 6/3/2021 0855 by Yaima Jovel, RN  Skin Protection:   adhesive use limited   tubing/devices free from skin contact  Goal: Optimal Comfort and Wellbeing  Outcome: Ongoing, Progressing  Goal: Readiness for Transition of Care  Outcome: Ongoing, Progressing   Goal Outcome Evaluation:  Plan of Care Reviewed With: patient  Progress: improving  Outcome Summary: pt remains hypertenstive, rapid response notified r/t decreased loc and weakened right arm, ct completed, mri and cta to be completed, much encouragement needed to particpate in care

## 2021-06-04 ENCOUNTER — APPOINTMENT (OUTPATIENT)
Dept: CT IMAGING | Facility: HOSPITAL | Age: 86
End: 2021-06-04

## 2021-06-04 ENCOUNTER — APPOINTMENT (OUTPATIENT)
Dept: NEUROLOGY | Facility: HOSPITAL | Age: 86
End: 2021-06-04

## 2021-06-04 ENCOUNTER — APPOINTMENT (OUTPATIENT)
Dept: MRI IMAGING | Facility: HOSPITAL | Age: 86
End: 2021-06-04

## 2021-06-04 LAB
ALBUMIN SERPL-MCNC: 3.2 G/DL (ref 3.5–5.2)
ANION GAP SERPL CALCULATED.3IONS-SCNC: 12.9 MMOL/L (ref 5–15)
BUN SERPL-MCNC: 32 MG/DL (ref 8–23)
BUN/CREAT SERPL: 25.2 (ref 7–25)
CALCIUM SPEC-SCNC: 9.4 MG/DL (ref 8.2–9.6)
CHLORIDE SERPL-SCNC: 102 MMOL/L (ref 98–107)
CHOLEST SERPL-MCNC: 133 MG/DL (ref 0–200)
CO2 SERPL-SCNC: 27.1 MMOL/L (ref 22–29)
CREAT SERPL-MCNC: 1.27 MG/DL (ref 0.57–1)
DEPRECATED RDW RBC AUTO: 42.5 FL (ref 37–54)
ERYTHROCYTE [DISTWIDTH] IN BLOOD BY AUTOMATED COUNT: 12.8 % (ref 12.3–15.4)
GFR SERPL CREATININE-BSD FRML MDRD: 48 ML/MIN/1.73
GLUCOSE BLDC GLUCOMTR-MCNC: 125 MG/DL (ref 70–130)
GLUCOSE BLDC GLUCOMTR-MCNC: 129 MG/DL (ref 70–130)
GLUCOSE BLDC GLUCOMTR-MCNC: 137 MG/DL (ref 70–130)
GLUCOSE BLDC GLUCOMTR-MCNC: 205 MG/DL (ref 70–130)
GLUCOSE SERPL-MCNC: 112 MG/DL (ref 65–99)
HCT VFR BLD AUTO: 31.8 % (ref 34–46.6)
HDLC SERPL-MCNC: 55 MG/DL (ref 40–60)
HGB BLD-MCNC: 10.3 G/DL (ref 12–15.9)
LDLC SERPL CALC-MCNC: 64 MG/DL (ref 0–100)
LDLC/HDLC SERPL: 1.17 {RATIO}
MAGNESIUM SERPL-MCNC: 1.8 MG/DL (ref 1.7–2.3)
MCH RBC QN AUTO: 29.7 PG (ref 26.6–33)
MCHC RBC AUTO-ENTMCNC: 32.4 G/DL (ref 31.5–35.7)
MCV RBC AUTO: 91.6 FL (ref 79–97)
PHOSPHATE SERPL-MCNC: 3.4 MG/DL (ref 2.5–4.5)
PLATELET # BLD AUTO: 182 10*3/MM3 (ref 140–450)
PMV BLD AUTO: 10.4 FL (ref 6–12)
POTASSIUM SERPL-SCNC: 4.4 MMOL/L (ref 3.5–5.2)
RBC # BLD AUTO: 3.47 10*6/MM3 (ref 3.77–5.28)
SODIUM SERPL-SCNC: 142 MMOL/L (ref 136–145)
TRIGL SERPL-MCNC: 69 MG/DL (ref 0–150)
VLDLC SERPL-MCNC: 14 MG/DL (ref 5–40)
WBC # BLD AUTO: 4.81 10*3/MM3 (ref 3.4–10.8)

## 2021-06-04 PROCEDURE — 95816 EEG AWAKE AND DROWSY: CPT

## 2021-06-04 PROCEDURE — 85027 COMPLETE CBC AUTOMATED: CPT | Performed by: INTERNAL MEDICINE

## 2021-06-04 PROCEDURE — 25010000002 ENOXAPARIN PER 10 MG: Performed by: INTERNAL MEDICINE

## 2021-06-04 PROCEDURE — 90791 PSYCH DIAGNOSTIC EVALUATION: CPT

## 2021-06-04 PROCEDURE — 99232 SBSQ HOSP IP/OBS MODERATE 35: CPT | Performed by: NURSE PRACTITIONER

## 2021-06-04 PROCEDURE — 82962 GLUCOSE BLOOD TEST: CPT

## 2021-06-04 PROCEDURE — 0 IOPAMIDOL PER 1 ML: Performed by: INTERNAL MEDICINE

## 2021-06-04 PROCEDURE — 80069 RENAL FUNCTION PANEL: CPT | Performed by: INTERNAL MEDICINE

## 2021-06-04 PROCEDURE — 70496 CT ANGIOGRAPHY HEAD: CPT

## 2021-06-04 PROCEDURE — 99233 SBSQ HOSP IP/OBS HIGH 50: CPT | Performed by: NURSE PRACTITIONER

## 2021-06-04 PROCEDURE — 63710000001 INSULIN GLARGINE PER 5 UNITS: Performed by: NURSE PRACTITIONER

## 2021-06-04 PROCEDURE — 97166 OT EVAL MOD COMPLEX 45 MIN: CPT

## 2021-06-04 PROCEDURE — 83735 ASSAY OF MAGNESIUM: CPT | Performed by: INTERNAL MEDICINE

## 2021-06-04 PROCEDURE — 97535 SELF CARE MNGMENT TRAINING: CPT

## 2021-06-04 PROCEDURE — 70498 CT ANGIOGRAPHY NECK: CPT

## 2021-06-04 PROCEDURE — 80061 LIPID PANEL: CPT | Performed by: PSYCHIATRY & NEUROLOGY

## 2021-06-04 PROCEDURE — 92610 EVALUATE SWALLOWING FUNCTION: CPT

## 2021-06-04 PROCEDURE — 70551 MRI BRAIN STEM W/O DYE: CPT

## 2021-06-04 PROCEDURE — 63710000001 INSULIN LISPRO (HUMAN) PER 5 UNITS: Performed by: NURSE PRACTITIONER

## 2021-06-04 PROCEDURE — 95816 EEG AWAKE AND DROWSY: CPT | Performed by: PSYCHIATRY & NEUROLOGY

## 2021-06-04 RX ORDER — SPIRONOLACTONE 25 MG/1
25 TABLET ORAL DAILY
Status: DISCONTINUED | OUTPATIENT
Start: 2021-06-04 | End: 2021-06-08

## 2021-06-04 RX ADMIN — ATORVASTATIN CALCIUM 80 MG: 80 TABLET, FILM COATED ORAL at 22:22

## 2021-06-04 RX ADMIN — IOPAMIDOL 95 ML: 755 INJECTION, SOLUTION INTRAVENOUS at 09:54

## 2021-06-04 RX ADMIN — INSULIN LISPRO 5 UNITS: 100 INJECTION, SOLUTION INTRAVENOUS; SUBCUTANEOUS at 17:21

## 2021-06-04 RX ADMIN — SODIUM CHLORIDE, PRESERVATIVE FREE 10 ML: 5 INJECTION INTRAVENOUS at 22:24

## 2021-06-04 RX ADMIN — INSULIN GLARGINE 10 UNITS: 100 INJECTION, SOLUTION SUBCUTANEOUS at 22:22

## 2021-06-04 RX ADMIN — ENOXAPARIN SODIUM 30 MG: 100 INJECTION SUBCUTANEOUS at 16:28

## 2021-06-04 RX ADMIN — SPIRONOLACTONE 25 MG: 25 TABLET ORAL at 17:21

## 2021-06-04 RX ADMIN — TORSEMIDE 40 MG: 20 TABLET ORAL at 11:33

## 2021-06-04 RX ADMIN — SODIUM CHLORIDE, PRESERVATIVE FREE 10 ML: 5 INJECTION INTRAVENOUS at 11:35

## 2021-06-04 RX ADMIN — HYDRALAZINE HYDROCHLORIDE 100 MG: 50 TABLET, FILM COATED ORAL at 22:22

## 2021-06-04 RX ADMIN — METOPROLOL SUCCINATE 150 MG: 100 TABLET, EXTENDED RELEASE ORAL at 22:22

## 2021-06-04 RX ADMIN — ISOSORBIDE MONONITRATE 120 MG: 60 TABLET ORAL at 11:33

## 2021-06-04 RX ADMIN — BUPROPION HYDROCHLORIDE 75 MG: 75 TABLET, FILM COATED ORAL at 22:22

## 2021-06-04 RX ADMIN — DILTIAZEM HYDROCHLORIDE 240 MG: 240 CAPSULE, COATED, EXTENDED RELEASE ORAL at 11:33

## 2021-06-04 RX ADMIN — Medication 1000 UNITS: at 11:33

## 2021-06-04 RX ADMIN — CLOPIDOGREL 75 MG: 75 TABLET, FILM COATED ORAL at 11:33

## 2021-06-04 RX ADMIN — BUPROPION HYDROCHLORIDE 75 MG: 75 TABLET, FILM COATED ORAL at 11:37

## 2021-06-04 RX ADMIN — ASPIRIN 325 MG: 325 TABLET ORAL at 11:33

## 2021-06-04 RX ADMIN — HYDRALAZINE HYDROCHLORIDE 100 MG: 50 TABLET, FILM COATED ORAL at 14:25

## 2021-06-04 RX ADMIN — SODIUM CHLORIDE, PRESERVATIVE FREE 10 ML: 5 INJECTION INTRAVENOUS at 11:34

## 2021-06-04 RX ADMIN — SODIUM CHLORIDE, PRESERVATIVE FREE 10 ML: 5 INJECTION INTRAVENOUS at 22:21

## 2021-06-04 RX ADMIN — HYDRALAZINE HYDROCHLORIDE 100 MG: 50 TABLET, FILM COATED ORAL at 06:26

## 2021-06-04 NOTE — PROGRESS NOTES
DOS: 2021  NAME: Denny Ivy   : 8/3/1929  PCP: Мария Wilks MD    Chief Complaint   Patient presents with   • Altered Mental Status        Stroke    Subjective: Pt seen in follow up, however the problem is new to me.  She thinks she feels okay.  Not really interested in answering questions.  Does not want her left leg messed with because she has soreness in her foot.  Son and caregiver at bedside.  All questions answered.    Objective:  Vital signs:      Vitals:    21 2135 21 2300 21 0519 21 0600   BP: (!) 190/81 165/69  (!) 201/81   BP Location:  Left arm  Left arm   Patient Position:  Lying  Lying   Pulse:  73     Resp:  16     Temp:  98.1 °F (36.7 °C)     TempSrc:  Oral     SpO2:  97%     Weight:   86.2 kg (190 lb)    Height:           Current Facility-Administered Medications:   •  acetaminophen (TYLENOL) tablet 650 mg, 650 mg, Oral, Q4H PRN **OR** acetaminophen (TYLENOL) 160 MG/5ML solution 650 mg, 650 mg, Oral, Q4H PRN **OR** acetaminophen (TYLENOL) suppository 650 mg, 650 mg, Rectal, Q4H PRN, Marlene Rizvi APRN  •  aspirin tablet 325 mg, 325 mg, Oral, Daily, 325 mg at 21 1133 **OR** aspirin suppository 300 mg, 300 mg, Rectal, Daily, Nico Raines MD  •  atorvastatin (LIPITOR) tablet 80 mg, 80 mg, Oral, Nightly, Nico Raines MD, 80 mg at 218  •  buPROPion (WELLBUTRIN) tablet 75 mg, 75 mg, Oral, Q12H, Kelsi Wilks APRN, 75 mg at 21 1137  •  cholecalciferol (VITAMIN D3) tablet 1,000 Units, 1,000 Units, Oral, Daily, Geoffrey Potts DO, 1,000 Units at 21 1133  •  clopidogrel (PLAVIX) tablet 75 mg, 75 mg, Oral, Daily, Nico Raines MD, 75 mg at 21 1133  •  dextrose (D50W) 25 g/ 50mL Intravenous Solution 25 g, 25 g, Intravenous, Q15 Min PRN, Marlene Rizvi APRN  •  dextrose (GLUTOSE) oral gel 15 g, 15 g, Oral, Q15 Min PRN, Marlene Rizvi APRN  •  dilTIAZem CD (CARDIZEM CD) 24 hr capsule 240  mg, 240 mg, Oral, Q24H, Mariangel West APRN, 240 mg at 06/04/21 1133  •  enoxaparin (LOVENOX) syringe 40 mg, 40 mg, Subcutaneous, Q24H, Geoffrey Potts, DO, 40 mg at 06/01/21 1546  •  glucagon (human recombinant) (GLUCAGEN DIAGNOSTIC) injection 1 mg, 1 mg, Subcutaneous, PRN, Marlene Rizvi APRN  •  hydrALAZINE (APRESOLINE) tablet 100 mg, 100 mg, Oral, Q8H, Geoffrey Potts, DO, 100 mg at 06/04/21 0626  •  insulin glargine (LANTUS, SEMGLEE) injection 10 Units, 10 Units, Subcutaneous, Nightly, Kelsi Wilks APRN, 10 Units at 06/03/21 2129  •  insulin lispro (ADMELOG) injection 0-14 Units, 0-14 Units, Subcutaneous, TID AC, Marlene Rizvi APRN, 5 Units at 06/03/21 1719  •  ipratropium-albuterol (DUO-NEB) nebulizer solution 3 mL, 3 mL, Nebulization, Q4H PRN, Disha Silva, DENNY, 3 mL at 06/03/21 0707  •  isosorbide mononitrate (IMDUR) 24 hr tablet 120 mg, 120 mg, Oral, Daily, Kelsi Wilks APRN, 120 mg at 06/04/21 1133  •  labetalol (NORMODYNE,TRANDATE) injection 10 mg, 10 mg, Intravenous, Q4H PRN, Mariangel West APRN, 10 mg at 06/03/21 1209  •  metoprolol succinate XL (TOPROL-XL) 24 hr tablet 150 mg, 150 mg, Oral, Nightly, Kelsi Wilks APRN, 150 mg at 06/03/21 2129  •  nitroglycerin (NITROSTAT) SL tablet 0.4 mg, 0.4 mg, Sublingual, Q5 Min PRN, Marlene Rizvi APRN  •  ondansetron (ZOFRAN) injection 4 mg, 4 mg, Intravenous, Q6H PRN, Marlene Rizvi APRN  •  [COMPLETED] Insert peripheral IV, , , Once **AND** sodium chloride 0.9 % flush 10 mL, 10 mL, Intravenous, PRN, Dereje Byrne MD  •  sodium chloride 0.9 % flush 10 mL, 10 mL, Intravenous, Q12H, Marlene Rizvi APRN, 10 mL at 06/04/21 1134  •  sodium chloride 0.9 % flush 10 mL, 10 mL, Intravenous, PRN, Marlene Rizvi APRN  •  sodium chloride 0.9 % flush 10 mL, 10 mL, Intravenous, Q12H, Nico Raines MD, 10 mL at 06/04/21 1135  •  sodium chloride 0.9 % flush 10 mL, 10 mL, Intravenous, PRN, Olu,  Nico BEYER MD  •  torsemide (DEMADEX) tablet 40 mg, 40 mg, Oral, Daily, Mariangel West, APRN, 40 mg at 06/04/21 1133    PRN meds  •  acetaminophen **OR** acetaminophen **OR** acetaminophen  •  dextrose  •  dextrose  •  glucagon (human recombinant)  •  ipratropium-albuterol  •  labetalol  •  nitroglycerin  •  ondansetron  •  [COMPLETED] Insert peripheral IV **AND** sodium chloride  •  sodium chloride  •  sodium chloride    No current facility-administered medications on file prior to encounter.     Current Outpatient Medications on File Prior to Encounter   Medication Sig   • aspirin 81 MG EC tablet Take 81 mg by mouth daily.   • buPROPion (WELLBUTRIN) 75 MG tablet Take 75 mg by mouth 2 (two) times a day.   • diltiazem (TIAZAC) 120 MG 24 hr capsule Take 120 mg by mouth daily.   • glucose blood (Accu-Chek Harriet Plus) test strip CBS three times daily   • hydrALAZINE (APRESOLINE) 100 MG tablet Take 1 tablet by mouth 3 (Three) Times a Day.   • HYDROcodone-acetaminophen (NORCO) 5-325 MG per tablet Take 1 tablet by mouth 4 (four) times a day as needed for severe pain (7-10).   • insulin glargine (LANTUS) 100 UNIT/ML injection Inject 22 Units under the skin into the appropriate area as directed Every Night.   • insulin lispro (HumaLOG) 100 UNIT/ML injection Inject  under the skin into the appropriate area as directed 3 (Three) Times a Day Before Meals. Sliding scale   • Insulin Pen Needle (B-D UF III MINI PEN NEEDLES) 31G X 5 MM misc 400 each by Other route.   • isosorbide mononitrate (IMDUR) 120 MG 24 hr tablet Take 1 tablet by mouth Daily.   • meclizine (ANTIVERT) 25 MG tablet Take 1 tablet by mouth 3 (Three) Times a Day As Needed for dizziness.   • metoprolol succinate XL (TOPROL-XL) 50 MG 24 hr tablet Take 3 tablets by mouth Every Night.   • ondansetron ODT (ZOFRAN-ODT) 4 MG disintegrating tablet Take 1 tablet by mouth Every 6 (Six) Hours As Needed for Nausea.   • pravastatin (PRAVACHOL) 40 MG tablet Take 40 mg by  mouth daily.   • torsemide (DEMADEX) 20 MG tablet Take 2 tablets by mouth Daily.       General appearance: Elderly female, NAD, alert and semi-cooperative, well groomed  HEENT: Normocephalic, atraumatic  COR: RRR  Resp: Even and unlabored  Extremities: Bilateral pedal edema  Skin: warm, dry    Neurological:   MS: oriented to self and place, had to read her white board to tell me the date and year, and correctly identify wall clock time recent/remote memory impaired, normal attention/concentration, language intact, no neglect  CN: visual acuity abnormal, visual fields full, PERRL, EOMI, facial sensation equal, no facial droop, hearing symmetric, palate elevates symmetrically, shoulder shrug equal, tongue midline  Motor: 5/5 in upper extremities, 2/5 in bilateral lower extremities  Reflexes: Toes downgoing  Sensory: light touch sensation intact in all 4 ext.  Coordination: Normal finger to nose test  Gait and station: Deferred due to BLE weakness  Rapid alternating movements: normal finger to thumb tap    Laboratory results:  Lab Results   Component Value Date    TSH 2.200 06/01/2021     Lab Results   Component Value Date    HGBA1C 6.36 (H) 05/31/2021     Lab Results   Component Value Date    MKWUDNHN68 1,031 (H) 06/01/2021     Lab Results   Component Value Date    CHOL 133 06/04/2021     Lab Results   Component Value Date    TRIG 69 06/04/2021     Lab Results   Component Value Date    HDL 55 06/04/2021     Lab Results   Component Value Date    LDL 64 06/04/2021     Lab Results   Component Value Date    WBC 4.81 06/04/2021    HGB 10.3 (L) 06/04/2021    HCT 31.8 (L) 06/04/2021    MCV 91.6 06/04/2021     06/04/2021     Lab Results   Component Value Date    GLUCOSE 112 (H) 06/04/2021    BUN 32 (H) 06/04/2021    CREATININE 1.27 (H) 06/04/2021    EGFRIFAFRI 48 (L) 06/04/2021    BCR 25.2 (H) 06/04/2021    K 4.4 06/04/2021    CO2 27.1 06/04/2021    CALCIUM 9.4 06/04/2021    ALBUMIN 3.20 (L) 06/04/2021    AST 12  06/01/2021    ALT 20 06/01/2021     No results found for: PTT  Lab Results   Component Value Date    INR 1.01 05/22/2021    INR 1.07 07/06/2018    PROTIME 13.1 05/22/2021    PROTIME 13.7 07/06/2018     Brief Urine Lab Results  (Last result in the past 365 days)      Color   Clarity   Blood   Leuk Est   Nitrite   Protein   CREAT   Urine HCG        05/30/21 2145 Yellow Clear Negative Negative Negative Trace               Review and interpretation of imaging:  CT Head Without Contrast    Result Date: 6/4/2021   No CT evidence for acute intracranial pathology.  Further evaluation could be performed with MR imaging, as clinically indicated.  Moderate changes of chronic small vessel ischemic phenomena are again incidentally noted.  A 3 mm ossific or densely calcific lesion lateral to the left frontal lobe compatible with either an osteoma or densely calcified meningioma is unchanged.  Radiation dose reduction techniques were utilized, including automated exposure control and exposure modulation based on body size.  This report was finalized on 6/4/2021 9:59 AM by Dr. Timbo Coughlin M.D.      CT Angiogram Neck    Result Date: 6/4/2021  1.  No evidence of focal high-grade intracranial stenosis or aneurysm proximally. There is 0% stenosis involving the carotid bifurcations using NASCET criteria. See above. 2.  Multiple cysts are noted involving the thyroid bilaterally new versus 04/04/2009 with the largest measuring approximately 2.6 cm in diameter and demonstrating thin septations centrally.   Radiation dose reduction techniques were utilized, including automated exposure control and exposure modulation based on body size.       MRI Brain Without Contrast    Result Date: 6/4/2021  1.  Moderate small vessel ischemic disease. There is no evidence of acute infarction.  This report was finalized on 6/4/2021 12:12 PM by Dr. Stanton Hoyos M.D.      CT Angiogram Head    Result Date: 6/4/2021  1.  No evidence of focal high-grade  "intracranial stenosis or aneurysm proximally. There is 0% stenosis involving the carotid bifurcations using NASCET criteria. See above. 2.  Multiple cysts are noted involving the thyroid bilaterally new versus 04/04/2009 with the largest measuring approximately 2.6 cm in diameter and demonstrating thin septations centrally.   Radiation dose reduction techniques were utilized, including automated exposure control and exposure modulation based on body size.       Results for orders placed during the hospital encounter of 05/22/21    Adult Transthoracic Echo Complete W/ Cont if Necessary Per Protocol    Interpretation Summary  · Estimated right ventricular systolic pressure from tricuspid regurgitation is mildly elevated (35-45 mmHg). Calculated right ventricular systolic pressure from tricuspid regurgitation is 42 mmHg.  · Estimated left ventricular EF = 65% Left ventricular systolic function is normal.  · Left ventricular diastolic function was indeterminate.      Impression/Assessment:  This is a 89-year-old female with past medical history of diabetes, hypertension, hypothyroidism, macular degeneration who presented to the hospital on 5/30/2021 with complaints of generalized weakness, increasing fatigue, and poor p.o. intake.  She was found to have hypotension, COLLIN, and hyperkalemia.  She was recently discharged from the hospital on 5/28 after she had an admission for acute on chronic diastolic CHF, COLLIN, and uncontrolled hypertension.  She received IV fluids overnight and reportedly started feeling better.    We were consulted on 6/3 due to the patient noted to be less responsive, not following commands, and questionable right arm weakness.  RRT was called and they noted the patient would make eye contact but would not answer any commands.  When they applied pressure to her feet she yelled out \"that foot hurts\".  She then became more cooperative but did not want to follow most of their commands.  They noted all of " her extremities appeared weak with questionable slight worsening of weakness of her RUE but she was able to use her right hand .  Patient's BP was 197/93, HR 80.  Stat CT head was ordered which revealed no acute intracranial abnormalities, small dense calcific lesion of the left frontal lobe likely small meningioma which is unchanged from previous head imaging in 2018.  She did eventually return to her baseline.  BP did improve but has since again become elevated.    Concern for left hemispheric TIA in the setting of hypertensive urgency therefore MRI brain and CTA H/N were ordered as well as Plavix load was given and Plavix 75 mg daily was added to her aspirin regimen.  MRI brain with and without reviewed and did not reveal any evidence of acute infarction, extensive small vessel disease noted. CTA H/N also reviewed no evidence of significant stenosis, dissection, or LVO noted.  Mention of multiple thyroid cyst bilaterally noted.  EEG is pending.    Diagnosis: Altered mental status and RUE weakness suspect left hemispheric TIA in the setting of hypertensive urgency, cognitive impairment likely underlying vascular dementia    Plan:  MRI reviewed with Dr. Raines, no evidence of stroke, incidental chronic small left frontal meningioma noted.  CTA H/N did not reveal any significant stenosis or LVO.  She has still been quite hypertensive but as of yet has not had any recurrence of symptoms.  EEG complete, read pending but low yield for seizure. I will ask reading neurologist to contact me if shows any active seizure focus.  Continue with plans of DAPT ASA 81 mg and Plavix 75 mg x 21 days and then would continue  mg monotherapy.  We will decrease her Lipitor to 40 mg, LDL 64.  Neurochecks per stroke protocol  Okay to normalize BP  Stroke Education  JENNIFER/SCDs   PT/OT/ST  Therapies as written. CCP for discharge planning. Call RRT for any acute neurological changes. We will sign off, will see again per  request.    Case discussed with patient, son at bedside, caregiver, RN, and Dr. Raines, and he agrees with plan above.     Addenda 1431: Attempted to call granddaughter Lilly per RN and pt son request to update her on plan of care. I received no answer and a message stating mailbox was full. Son gave me number, [phone]. I did update the RN on imaging results and plan of care as far as TIA.     Elena Tipton, APRN

## 2021-06-04 NOTE — PLAN OF CARE
Goal Outcome Evaluation:  Plan of Care Reviewed With: patient, caregiver     Outcome Summary: Pt presents from home where pt lives with full time caregivers with fatigue, low blood sugar. Imaging negative for CVA. Pt caregiver present throughout session and pt is assist of 1 and encouragement to get OOB, ambulate to/from bathroom at walker level. Pt with chronic impaired B shld but able to feed self.   Pt may benefit from skilled OT to increase safety and independence with plan to return home with caregivers.        Patient was placed in a face mask intermittently during this therapy encounter. Therapist used appropriate personal protective equipment including surgical mask, eye shield and gloves during the entire therapy session. Hand hygiene was completed before and after therapy session. Patient is not in enhanced droplet precautions.

## 2021-06-04 NOTE — PROGRESS NOTES
"    Patient Name: Denny Ivy  :8/3/1929  89 y.o.      Patient Care Team:  Мария Wilks MD as PCP - General (Internal Medicine)  Мария Wilks MD as PCP - Internal Medicine    Chief Complaint: follow up hypertension, chronic diastolic heart failure    Interval History:  Blood pressure remains significantly elevated. CCB increased yesterday. Had altered mental status overnight. Neurology evaluated.     Objective   Vital Signs  Temp:  [97.7 °F (36.5 °C)-98.1 °F (36.7 °C)] 98.1 °F (36.7 °C)  Heart Rate:  [73-75] 73  Resp:  [16] 16  BP: (122-201)/(69-91) 201/81    Intake/Output Summary (Last 24 hours) at 2021 1504  Last data filed at 2021 0500  Gross per 24 hour   Intake 60 ml   Output 1000 ml   Net -940 ml     Flowsheet Rows      First Filed Value   Admission Height  154.9 cm (61\") Documented at 2021   Admission Weight  83.8 kg (184 lb 11.2 oz) Documented at 2021          Physical Exam:   General Appearance:   awake. Up in chair.    Lungs:     Clear to auscultation.  Normal respiratory effort and rate.      Heart:    Regular rhythm and normal rate, normal S1 and S2, no murmurs, gallops or rubs.     Chest Wall:    No abnormalities observed   Abdomen:     Soft, nontender, positive bowel sounds.     Extremities:   no cyanosis, clubbing or edema.  No marked joint deformities.  Adequate musculoskeletal strength.       Results Review:    Results from last 7 days   Lab Units 21  0549   SODIUM mmol/L 142   POTASSIUM mmol/L 4.4   CHLORIDE mmol/L 102   CO2 mmol/L 27.1   BUN mg/dL 32*   CREATININE mg/dL 1.27*   GLUCOSE mg/dL 112*   CALCIUM mg/dL 9.4         Results from last 7 days   Lab Units 21  0549   WBC 10*3/mm3 4.81   HEMOGLOBIN g/dL 10.3*   HEMATOCRIT % 31.8*   PLATELETS 10*3/mm3 182         Results from last 7 days   Lab Units 21  0549   MAGNESIUM mg/dL 1.8     Results from last 7 days   Lab Units 21  0549   CHOLESTEROL mg/dL 133   TRIGLYCERIDES mg/dL " 69   HDL CHOL mg/dL 55   LDL CHOL mg/dL 64               Medication Review:   aspirin, 325 mg, Oral, Daily   Or  aspirin, 300 mg, Rectal, Daily  atorvastatin, 80 mg, Oral, Nightly  buPROPion, 75 mg, Oral, Q12H  cholecalciferol, 1,000 Units, Oral, Daily  clopidogrel, 75 mg, Oral, Daily  dilTIAZem CD, 240 mg, Oral, Q24H  enoxaparin, 40 mg, Subcutaneous, Q24H  hydrALAZINE, 100 mg, Oral, Q8H  insulin glargine, 10 Units, Subcutaneous, Nightly  insulin lispro, 0-14 Units, Subcutaneous, TID AC  isosorbide mononitrate, 120 mg, Oral, Daily  metoprolol succinate XL, 150 mg, Oral, Nightly  sodium chloride, 10 mL, Intravenous, Q12H  sodium chloride, 10 mL, Intravenous, Q12H  spironolactone, 25 mg, Oral, Daily  torsemide, 40 mg, Oral, Daily              Assessment/Plan       1.  Dyspnea most likely secondary to acute diastolic heart failure. She is back on oral diuretics.   2.  Hypertensive urgency   3.  Chronic kidney disease  4.  Diabetes  5.  Obstructive sleep apnea.  6.  Acute mental status change    - she was initially hypotensive. This has resolved and she is back on her home regimen and BP remains uncontrolled.  CCB increased yesterday. BP poorly controlled.   - add spironolactone. K 4.4. will need to watch this closely.   - check renal artery duplex.   - discussed with granddaughter Smitha via phone.    DENNY Lopez  [facility]  06/04/21  15:04 EDT

## 2021-06-04 NOTE — PROGRESS NOTES
[facility]    [facility]    Name:  Denny Ivy   Age:  89 y.o.  Sex:  female  :  8/3/1929  MRN:  [id number]   Visit Number:  [id number]  Admission Date:  2021  Date Of Service:  21  Primary Care Physician:  Мария Wilks MD     LOS: 4 days :  Patient Care Team:  Мария Wilks MD as PCP - General (Internal Medicine)  Мария Wilks MD as PCP - Internal Medicine:    History taken from:     patient chart    Chief Complaint:      Weakness    Subjective     Interval History:     Patient seen and examined again today.    89-year-old female with type 2 diabetes, hypertension, hypothyroidism, diastolic CHF, chronic kidney disease was discharged from the hospital 2021 after being admitted for congestive heart failure.  She went from 190 pounds down to 180.  She continued not to eat after being discharged and was down to 168 pounds.  Creatinine was up to 1.76.  Diuretics were held, IV fluids were given and the creatinine came down.  Diuretics have been restarted, creatinine slowly increasing to 1.27.      Unfortunately her blood pressure has gone up as well.  Cardiology is following.  Patient is currently on Cardizem CD which has been increased, Demadex, Apresoline, Imdur, Toprol-XL, and now Aldactone.  Renal artery duplex has been ordered.    Patient appeared to have some confusion on 6/3/2021, so rapid response was called.  CT of the brain was done and neurology was consulted.  CT of the brain did not show any acute intracranial pathology.  MRI and CTA of the head neck were ordered.  These did not reveal any significant blockage or CVA.  Thyroid cyst were noted.  Neurology recommends dual antiplatelet therapy for 21 days and then aspirin daily.  Lipitor is continued.  Patient does have a 3 mm osteoma or calcified meningioma which is unchanged.  They have signed off.    Speech therapy saw the patient and recommended regular diet with thin liquids and signed  off.  PT and OT are following.    Reviewed all results with patient. Spoke to granddaughter answered all questions.     Review of Systems:     All systems were reviewed and negative except for:  Musculoskeletal: positive for  muscle weakness    Objective     Vital Signs:    Temp:  [97.7 °F (36.5 °C)-98.1 °F (36.7 °C)] 98.1 °F (36.7 °C)  Heart Rate:  [73-75] 73  Resp:  [16] 16  BP: (122-201)/(69-91) 166/69    Physical Exam:    General: Alert and oriented x3, mild distress.  Heart: Regular rate and rhythm without murmur rub or thrill.  Lungs: Decreased breath sounds bilaterally without use of accessory muscles respiration.  Abdomen: Soft/nontender/nondistended.  No HSM noted.  MSK: 4/5 strength in upper/lower extremities bilaterally.     Results Review:      I reviewed the patient's new clinical results.  I reviewed the patient's new imaging results and agree with the interpretation.  I reviewed the patient's other test results and agree with the interpretation    Labs:    Lab Results (last 24 hours)     Procedure Component Value Units Date/Time    POC Glucose Once [[id number]]  (Normal) Collected: 06/04/21 1130    Specimen: Blood Updated: 06/04/21 1133     Glucose 129 mg/dL     Renal Function Panel [[id number]]  (Abnormal) Collected: 06/04/21 0549    Specimen: Blood Updated: 06/04/21 1008     Glucose 112 mg/dL      BUN 32 mg/dL      Creatinine 1.27 mg/dL      Sodium 142 mmol/L      Potassium 4.4 mmol/L      Chloride 102 mmol/L      CO2 27.1 mmol/L      Calcium 9.4 mg/dL      Albumin 3.20 g/dL      Phosphorus 3.4 mg/dL      Anion Gap 12.9 mmol/L      BUN/Creatinine Ratio 25.2     eGFR  African Amer 48 mL/min/1.73     Narrative:      GFR Normal >60  Chronic Kidney Disease <60  Kidney Failure <15      Lipid Panel [[id number]] Collected: 06/04/21 0549    Specimen: Blood Updated: 06/04/21 1008     Total Cholesterol 133 mg/dL      Triglycerides 69 mg/dL      HDL Cholesterol 55 mg/dL      LDL Cholesterol  64 mg/dL      VLDL  Cholesterol 14 mg/dL      LDL/HDL Ratio 1.17    Narrative:      Cholesterol Reference Ranges  (U.S. Department of Health and Human Services ATP III Classifications)    Desirable          <200 mg/dL  Borderline High    200-239 mg/dL  High Risk          >240 mg/dL      Triglyceride Reference Ranges  (U.S. Department of Health and Human Services ATP III Classifications)    Normal           <150 mg/dL  Borderline High  150-199 mg/dL  High             200-499 mg/dL  Very High        >500 mg/dL    HDL Reference Ranges  (U.S. Department of Health and Human Services ATP III Classifcations)    Low     <40 mg/dl (major risk factor for CHD)  High    >60 mg/dl ('negative' risk factor for CHD)        LDL Reference Ranges  (U.S. Department of Health and Human Services ATP III Classifcations)    Optimal          <100 mg/dL  Near Optimal     100-129 mg/dL  Borderline High  130-159 mg/dL  High             160-189 mg/dL  Very High        >189 mg/dL    Magnesium [[id number]]  (Normal) Collected: 06/04/21 0549    Specimen: Blood Updated: 06/04/21 1008     Magnesium 1.8 mg/dL     CBC (No Diff) [[id number]]  (Abnormal) Collected: 06/04/21 0549    Specimen: Blood Updated: 06/04/21 0701     WBC 4.81 10*3/mm3      RBC 3.47 10*6/mm3      Hemoglobin 10.3 g/dL      Hematocrit 31.8 %      MCV 91.6 fL      MCH 29.7 pg      MCHC 32.4 g/dL      RDW 12.8 %      RDW-SD 42.5 fl      MPV 10.4 fL      Platelets 182 10*3/mm3     POC Glucose Once [[id number]]  (Normal) Collected: 06/04/21 0609    Specimen: Blood Updated: 06/04/21 0611     Glucose 125 mg/dL     POC Glucose Once [[id number]]  (Abnormal) Collected: 06/03/21 2026    Specimen: Blood Updated: 06/03/21 2028     Glucose 177 mg/dL            Radiology:    Imaging Results (Last 24 Hours)     Procedure Component Value Units Date/Time    CT Angiogram Head [[id number]] Collected: 06/04/21 1249     Updated: 06/04/21 1249    Narrative:      CT ANGIOGRAM NECK AND HEAD WITH CONTRAST     HISTORY: TIA,  confusion, altered mental status.     Initially, noncontrasted CT examination of brain was performed. There is  no evidence of intracranial hemorrhage, hydrocephalus or of abnormal  extra-axial fluid. Moderate small vessel ischemic disease is noted. Area  of dural calcification is noted lateral to the left frontal lobe  measuring 3 x 4 mm in size consistent with a small meningioma.     A CT angiogram of the neck and head was then performed. Multiplanar as  well as 3-dimensional reconstructions were generated.     FINDINGS: The great vessels are arranged in a classic configuration.  Atherosclerotic disease is appreciated involving the origins of the  great vessels but without stenosis. There is 0% stenosis involving the  carotid bifurcations using NASCET criteria. The distal aspects of the  internal carotid arteries demonstrate moderate vascular calcification  with no evidence of a focal high-grade stenosis. The proximal aspects of  the anterior and middle cerebral arteries appear unremarkable.     Both vertebral arteries were opacified. The left vertebral artery is  slightly larger than that of the right. A small sal of vascular  calcification is noted at the origin of the left vertebral artery. A  mild to moderate stenosis of the left vertebral artery origin could not  be excluded. There is no evidence of a focal high-grade stenosis of the  cervical segments of the vertebral arteries. The basilar artery and the  proximal aspects of the posterior cerebral arteries appear unremarkable.     The thyroid glands are heterogeneous with multiple cysts present  bilaterally the largest of which are complex and septated. The largest  cyst involves the right lobe of thyroid and measures approximately 2.6  cm in size. These are new versus the CT examination of the chest  performed on 04/04/2009. Sagittal reconstructions demonstrate severe  loss of disc height at C4-5 and moderate loss of disc height at C3-4.  There is grade 1  anterolisthesis of C2 upon C3 estimated to be  approximately 4 mm, 2 mm of anterolisthesis of C3 upon C4 and 3-4 mm of  anterolisthesis of C4 upon C5. Anterior bridging osteophyte is present  at C5-6.       Impression:      1.  No evidence of focal high-grade intracranial stenosis or aneurysm  proximally. There is 0% stenosis involving the carotid bifurcations  using NASCET criteria. See above.  2.  Multiple cysts are noted involving the thyroid bilaterally new  versus 04/04/2009 with the largest measuring approximately 2.6 cm in  diameter and demonstrating thin septations centrally.        Radiation dose reduction techniques were utilized, including automated  exposure control and exposure modulation based on body size.          CT Angiogram Neck [[id number]] Collected: 06/04/21 1249     Updated: 06/04/21 1249    Narrative:      CT ANGIOGRAM NECK AND HEAD WITH CONTRAST     HISTORY: TIA, confusion, altered mental status.     Initially, noncontrasted CT examination of brain was performed. There is  no evidence of intracranial hemorrhage, hydrocephalus or of abnormal  extra-axial fluid. Moderate small vessel ischemic disease is noted. Area  of dural calcification is noted lateral to the left frontal lobe  measuring 3 x 4 mm in size consistent with a small meningioma.     A CT angiogram of the neck and head was then performed. Multiplanar as  well as 3-dimensional reconstructions were generated.     FINDINGS: The great vessels are arranged in a classic configuration.  Atherosclerotic disease is appreciated involving the origins of the  great vessels but without stenosis. There is 0% stenosis involving the  carotid bifurcations using NASCET criteria. The distal aspects of the  internal carotid arteries demonstrate moderate vascular calcification  with no evidence of a focal high-grade stenosis. The proximal aspects of  the anterior and middle cerebral arteries appear unremarkable.     Both vertebral arteries were  opacified. The left vertebral artery is  slightly larger than that of the right. A small sal of vascular  calcification is noted at the origin of the left vertebral artery. A  mild to moderate stenosis of the left vertebral artery origin could not  be excluded. There is no evidence of a focal high-grade stenosis of the  cervical segments of the vertebral arteries. The basilar artery and the  proximal aspects of the posterior cerebral arteries appear unremarkable.     The thyroid glands are heterogeneous with multiple cysts present  bilaterally the largest of which are complex and septated. The largest  cyst involves the right lobe of thyroid and measures approximately 2.6  cm in size. These are new versus the CT examination of the chest  performed on 04/04/2009. Sagittal reconstructions demonstrate severe  loss of disc height at C4-5 and moderate loss of disc height at C3-4.  There is grade 1 anterolisthesis of C2 upon C3 estimated to be  approximately 4 mm, 2 mm of anterolisthesis of C3 upon C4 and 3-4 mm of  anterolisthesis of C4 upon C5. Anterior bridging osteophyte is present  at C5-6.       Impression:      1.  No evidence of focal high-grade intracranial stenosis or aneurysm  proximally. There is 0% stenosis involving the carotid bifurcations  using NASCET criteria. See above.  2.  Multiple cysts are noted involving the thyroid bilaterally new  versus 04/04/2009 with the largest measuring approximately 2.6 cm in  diameter and demonstrating thin septations centrally.        Radiation dose reduction techniques were utilized, including automated  exposure control and exposure modulation based on body size.          MRI Brain Without Contrast [[id number]] Collected: 06/04/21 1101     Updated: 06/04/21 1215    Narrative:      MRI EXAMINATION BRAIN WITHOUT CONTRAST     HISTORY: TIA.     COMPARISON: CT head 06/03/2021.     TECHNIQUE: A MRI examination of brain was performed utilizing sagittal  T1, axial diffusion,  T1, T2, T2 FLAIR and gradient echo T2 sequences.      FINDINGS: There is no evidence of restricted diffusion or of  hydrocephalus. Prominent CSF is noted anterolateral and lateral to the  cerebellar hemispheres bilaterally similar in appearance as compared to  the CT examination of 05/11/2012. Moderate small vessel ischemic disease  is noted. There is no evidence of mass or mass effect on this  noncontrasted MRI examination of the brain.     On the axial gradient echo T2 sequence a subtle area of signal loss is  appreciated related which appears to be dural-based overlying the  superolateral aspect of left frontal lobe. This could represent a  partially calcified meningioma and measures approximately 11 mm in the  AP dimension and 8 mm in transverse dimension.        Impression:      1.  Moderate small vessel ischemic disease. There is no evidence of  acute infarction.     This report was finalized on 6/4/2021 12:12 PM by Dr. Stanton Hoyos M.D.       CT Head Without Contrast [[id number]] Collected: 06/03/21 1517     Updated: 06/04/21 1003    Narrative:      CT SCAN HEAD WITHOUT CONTRAST     CLINICAL HISTORY: Less responsive. Right-sided weakness.     CT scan of the head was obtained with 3 mm axial images. No intravenous  contrast was administered.     Comparison is made to previous CT scan of the head dated 07/06/2018.     FINDINGS:     There are moderate changes of chronic small vessel ischemic phenomena.  No significant interval change is seen in these findings. The  ventricles, sulci, and cisterns are age appropriate. The basal ganglia  and thalami are unremarkable in appearance. The posterior fossa  structures are within normal limits. Atherosclerotic changes are seen  within the intracranial vasculature.     Incidental note is made of a 3 mm ossific or densely calcific  extra-axial lesion lateral to the left frontal lobe compatible with  either a densely calcified meningioma or an osteoma. This is  unchanged  when compared to the prior head CT dated 07/06/2018.       Impression:         No CT evidence for acute intracranial pathology.      Further evaluation could be performed with MR imaging, as clinically  indicated.     Moderate changes of chronic small vessel ischemic phenomena are again  incidentally noted.     A 3 mm ossific or densely calcific lesion lateral to the left frontal  lobe compatible with either an osteoma or densely calcified meningioma  is unchanged.     Radiation dose reduction techniques were utilized, including automated  exposure control and exposure modulation based on body size.     This report was finalized on 6/4/2021 9:59 AM by Dr. Timbo Coughlin M.D.             Medication Review:     aspirin, 325 mg, Oral, Daily   Or  aspirin, 300 mg, Rectal, Daily  atorvastatin, 80 mg, Oral, Nightly  buPROPion, 75 mg, Oral, Q12H  cholecalciferol, 1,000 Units, Oral, Daily  clopidogrel, 75 mg, Oral, Daily  dilTIAZem CD, 240 mg, Oral, Q24H  enoxaparin, 30 mg, Subcutaneous, Q24H  hydrALAZINE, 100 mg, Oral, Q8H  insulin glargine, 10 Units, Subcutaneous, Nightly  insulin lispro, 0-14 Units, Subcutaneous, TID AC  isosorbide mononitrate, 120 mg, Oral, Daily  metoprolol succinate XL, 150 mg, Oral, Nightly  sodium chloride, 10 mL, Intravenous, Q12H  sodium chloride, 10 mL, Intravenous, Q12H  spironolactone, 25 mg, Oral, Daily  torsemide, 40 mg, Oral, Daily             Assessment/Plan     Principal Problem:    Generalized weakness  Active Problems:    PRABHU treated with autoBiPAP    HTN (hypertension)    Type 2 diabetes mellitus, with long-term current use of insulin (CMS/[facility])    CKD (chronic kidney disease) stage 3, GFR 30-59 ml/min (CMS/[facility])    COLLIN (acute kidney injury) (CMS/[facility])    Hypotension    Weight loss    Anemia    Hyperkalemia    Chronic diastolic CHF (congestive heart failure) (CMS/[facility])      1.  Hypotension, likely due to overdiuresis.  This is resolved.  2.  Hypertension, uncontrolled, continue to  monitor.  3.  Diabetes mellitus type 2  4.  Chronic kidney disease stage III  5.  PRABHU  6.  Metabolic encephalopathy, likely due to low blood pressure, resolved.  7.  3 mm left frontal osteoma or meningioma  8.  Brief metabolic encephalopathy, stroke ruled out.  9.  Depression, suspect reactive  10.  Multiple thyroid cysts.  Ultrasound as an outpatient.    Plan:      Continue blood pressure medications per cardiology.  Monitor blood pressure, slowly improving.  Monitor lab work.  Monitor blood sugars, these are stable.  Neurology has signed off.  Lovenox added for DVT prophylaxis.  PT working with patient.  Anticipate home once blood pressure is controlled.  Further recommendations will depend on the clinical course.    Geoffrey Potts,   06/04/21  15:33 EDT

## 2021-06-04 NOTE — CASE MANAGEMENT/SOCIAL WORK
Continued Stay Note  [facility]     Patient Name: Denny Ivy  MRN: [id number]  Today's Date: 6/4/2021    Admit Date: 5/30/2021    Discharge Plan     Row Name 06/04/21 1347       Plan    Plan  Home with HH and caregivers    Patient/Family in Agreement with Plan  yes    Plan Comments  Plan remains for pt to return home at discharge with caregivers and [facility].  [facility] continues to follow.  [facility] continues to follow.  No further needs identified at this time.  SAAD Schmidt RN        Discharge Codes    No documentation.       Expected Discharge Date and Time     Expected Discharge Date Expected Discharge Time    Nick 3, 2021             Ciara Schmidt, RN

## 2021-06-04 NOTE — PLAN OF CARE
Goal Outcome Evaluation:  Plan of Care Reviewed With: patient, caregiver     Outcome Summary: bedside swallow eval completed. no overt s/s of aspiration with thin, puree, mech soft, reg solids. mastication slow but fxnl. REC: reg diet/thins, meds with thin or puree as tolerated, up at 90', small bites/drinks. SLP to s/o.

## 2021-06-04 NOTE — CONSULTS
Acute rehab referral received via stroke order set. Patient back to neurologic baseline per neurology. Will sign off.     Thank you!    Adam Lopez RN  p

## 2021-06-04 NOTE — PLAN OF CARE
Goal Outcome Evaluation:  Plan of Care Reviewed With: (P) patient, grandchild(darrian), son     Outcome Summary: (P) BP elevated during the night, 190/81, but decreased to 165/69 with meds. Other VSS besides BP. Will continue to monitor.

## 2021-06-04 NOTE — THERAPY EVALUATION
Patient Name: Denny Ivy  : 8/3/1929    MRN: [id number]                              Today's Date: 2021       Admit Date: 2021    Visit Dx:     ICD-10-CM ICD-9-CM   1. Generalized weakness  R53.1 780.79   2. Acute renal failure, unspecified acute renal failure type (CMS/[facility])  N17.9 584.9   3. Hyperkalemia  E87.5 276.7     Patient Active Problem List   Diagnosis   • PRABHU treated with autoBiPAP   • Hypersomnia due to medical condition   • Chronic congestive heart failure (CMS/[facility])   • HTN (hypertension)   • Type 2 diabetes mellitus, with long-term current use of insulin (CMS/[facility])   • CKD (chronic kidney disease) stage 3, GFR 30-59 ml/min (CMS/HCC)   • COLLIN (acute kidney injury) (CMS/HCC)   • Generalized weakness   • Hypotension   • Weight loss   • Anemia   • Hyperkalemia   • Chronic diastolic CHF (congestive heart failure) (CMS/[facility])     Past Medical History:   Diagnosis Date   • Arthritis    • Diabetes 1.5, managed as type 2 (CMS/[facility])    • Edema    • Hypertension    • Hypothyroidism    • Low back pain    • Macular degeneration      Past Surgical History:   Procedure Laterality Date   • CARDIAC SURGERY     • CHOLECYSTECTOMY     • HIATAL HERNIA REPAIR     • HYSTERECTOMY       General Information     Row Name 21 1447          OT Time and Intention    Document Type  evaluation;therapy note (daily note)  -LE     Mode of Treatment  individual therapy;occupational therapy  -LE     Row Name 21 1447          General Information    Patient Profile Reviewed  yes  -LE     Prior Level of Function  mod assist:;ADL's walker use.  gets up on own to bathroom at times. has / caregivers and assist with ADL at times  -LE     Existing Precautions/Restrictions  fall per RN and neuro notes OK to get up and mobilize.  -LE     Row Name 21 1447          Occupational Profile    Reason for Services/Referral (Occupational Profile)  pt admit from home with fatigue, low blood sugar and high BP.  Imaging (-)  for CVA.  pt lives at home with 24/7 caregivers.  -     Row Name 06/04/21 1447          Living Environment    Lives With  -- 24/7 caregivers  -     Row Name 06/04/21 1447          Cognition    Orientation Status (Cognition)  oriented to;person;place  -LE       User Key  (r) = Recorded By, (t) = Taken By, (c) = Cosigned By    Initials Name Provider Type    Marianna Mcdonald, OTR Occupational Therapist          Mobility/ADL's     Row Name 06/04/21 1449          Bed Mobility    Bed Mobility  supine-sit;sit-supine  -LE     Supine-Sit [place] (Bed Mobility)  moderate assist (50% patient effort);verbal cues  -LE     Sit-Supine [place] (Bed Mobility)  minimum assist (75% patient effort);verbal cues  -LE     Assistive Device (Bed Mobility)  head of bed elevated;bed rails  -LE     Comment (Bed Mobility)  pt does not want L LE touched  -     Row Name 06/04/21 1449          Transfers    Transfers  toilet transfer;sit-stand transfer  -LE     Sit-Stand [place] (Transfers)  minimum assist (75% patient effort);verbal cues holds OT hands and then stand at walker  -     Row Name 06/04/21 1449          Toilet Transfer    Type (Toilet Transfer)  -- pt walks to door of bathroom and declines to sit on toilet so OT can assist to change wet brief.  -     Row Name 06/04/21 1449          Functional Mobility    Functional Mobility- Ind. Level  minimum assist (75% patient effort)  -LE     Functional Mobility- Device  rolling walker  -LE     Functional Mobility-Distance (Feet)  -- to/from bathroom door  -LE     Functional Mobility- Comment  pt leans over walker. OT cues pt to stand as tall as able. pt does not make an effort to attempt upright. caregiver states at/near baseline posture at walker.  -     Row Name 06/04/21 1449          Activities of Daily Living    BADL Assessment/Intervention  toileting;feeding;lower body dressing  -     Row Name 06/04/21 1449          Toileting Assessment/Training    Comment  (Toileting)  brief, pad and linens wet.   RN present to begin OT and aware purwick not connected.  OT strips bed of layers of pads and sheet and puts down clean pad and notify RN of need for brief change.  -     Row Name 06/04/21 1449          Self-Feeding Assessment/Training    Comment (Feeding)  first attempt pt watching TV and plate on tray table.  pt reports has to finish eating and maybe will work later.  OT returns later for eval. pt denies assist to eat.  -     Row Name 06/04/21 1449          Lower Body Dressing Assessment/Training    [place] Level (Lower Body Dressing)  don;socks;dependent (less than 25% patient effort)  -LE     Position (Lower Body Dressing)  supine  -LE     Comment (Lower Body Dressing)  care taken when donning L sock and removing SCD's.  -LE       User Key  (r) = Recorded By, (t) = Taken By, (c) = Cosigned By    Initials Name Provider Type    Marianna Mcdonald OTR Occupational Therapist        Obj/Interventions     Row Name 06/04/21 1454          Range of Motion Comprehensive    Comment, General Range of Motion  chronic impaired B shld.   R shld 1/4 AROM and L shld 1/3 AROM.  distal 2/3.  -LE     Row Name 06/04/21 1454          Balance    Balance Assessment  sitting static balance;standing static balance  -LE     Static Sitting Balance  WFL  -LE     Static Standing Balance  mild impairment flexes over walker.  -LE     Comment, Balance  cued for upright posture and benefit of upright standing.  -LE       User Key  (r) = Recorded By, (t) = Taken By, (c) = Cosigned By    Initials Name Provider Type    Marianna Mcdonald OTR Occupational Therapist        Goals/Plan     Row Name 06/04/21 1500          Transfer Goal 1 (OT)    Activity/Assistive Device (Transfer Goal 1, OT)  sit-to-stand/stand-to-sit;bed-to-chair/chair-to-bed;toilet  -LE     [place] Level/Cues Needed (Transfer Goal 1, OT)  standby assist  -LE     Time Frame (Transfer Goal 1, OT)  2 weeks  -LE     Progress/Outcome  (Transfer Goal 1, OT)  goal ongoing  -LE     Row Name 06/04/21 1500          Dressing Goal 1 (OT)    Activity/Device (Dressing Goal 1, OT)  upper body dressing;lower body dressing  -LE     [place]/Cues Needed (Dressing Goal 1, OT)  minimum assist (75% or more patient effort)  -LE     Time Frame (Dressing Goal 1, OT)  2 weeks  -LE     Progress/Outcome (Dressing Goal 1, OT)  goal ongoing  -LE     Row Name 06/04/21 1500          Toileting Goal 1 (OT)    Activity/Device (Toileting Goal 1, OT)  toileting skills, all  -LE     [place] Level/Cues Needed (Toileting Goal 1, OT)  set-up required;standby assist  -LE     Time Frame (Toileting Goal 1, OT)  2 weeks  -LE     Progress/Outcome (Toileting Goal 1, OT)  goal ongoing  -LE     Row Name 06/04/21 1500          Therapy Assessment/Plan (OT)    Planned Therapy Interventions (OT)  activity tolerance training;adaptive equipment training;BADL retraining;occupation/activity based interventions;patient/caregiver education/training;transfer/mobility retraining  -LE       User Key  (r) = Recorded By, (t) = Taken By, (c) = Cosigned By    Initials Name Provider Type    Marianna Mcdonald OTR Occupational Therapist        Clinical Impression     Row Name 06/04/21 3056          Pain Assessment    Additional Documentation  Pain Scale: Numbers Pre/Post-Treatment (Group)  -LE     Row Name 06/04/21 6994          Pain Scale: Numbers Pre/Post-Treatment    Pretreatment Pain Rating  0/10 - no pain at rest.  -LE     Posttreatment Pain Rating  -- no grimace noted when moving to EOB. pt reports chronic pain L LE but when asked if in pain  during OT pt replies no  -LE     Row Name 06/04/21 0318          Plan of Care Review    Plan of Care Reviewed With  patient;caregiver  -LE     Outcome Summary  Pt presents from home where pt lives with full time caregivers with fatigue, low blood sugar. Imaging negative for CVA. Pt caregiver present throughout session and pt is assist of 1 and  encouragement to get OOB, ambulate to/from bathroom at walker level. Pt with chronic impaired B shld but able to feed self.   Pt may benefit from skilled OT to increase safety and independence with plan to return home with caregivers.  -     Row Name 06/04/21 1456          Therapy Assessment/Plan (OT)    Patient/Family Therapy Goal Statement (OT)  return home  -LE     Rehab Potential (OT)  fair, will monitor progress closely  -LE     Criteria for Skilled Therapeutic Interventions Met (OT)  meets criteria;yes  -LE     Therapy Frequency (OT)  3 times/wk  -LE     Row Name 06/04/21 1456          Therapy Plan Review/Discharge Plan (OT)    Equipment Needs Upon Discharge (OT)  -- owns walker, elevated commode with arms.  -LE     Anticipated Discharge Disposition (OT)  home with 24/7 care  -     Row Name 06/04/21 1456          Vital Signs    O2 Delivery Pre Treatment  room air  -LE     O2 Delivery Intra Treatment  room air  -LE     O2 Delivery Post Treatment  room air  -LE     Pre Patient Position  Supine  -LE     Intra Patient Position  Standing  -LE     Post Patient Position  Supine  -LE     Row Name 06/04/21 1456          Positioning and Restraints    Pre-Treatment Position  in bed  -LE     Post Treatment Position  bed  -LE     In Bed  notified nsg;fowlers;call light within reach;encouraged to call for assist;exit alarm on;with family/caregiver SCD left off as nsg to come change brief/bed linens.  -LE       User Key  (r) = Recorded By, (t) = Taken By, (c) = Cosigned By    Initials Name Provider Type    Marianna Mcdonald OTR Occupational Therapist        Outcome Measures     Row Name 06/04/21 1500          How much help from another is currently needed...    Putting on and taking off regular lower body clothing?  2  -LE     Bathing (including washing, rinsing, and drying)  2  -LE     Toileting (which includes using toilet bed pan or urinal)  1  -LE     Putting on and taking off regular upper body clothing  2  -LE      Taking care of personal grooming (such as brushing teeth)  3  -LE     Eating meals  3  -LE     AM-PAC 6 Clicks Score (OT)  13  -CURLY     Row Name 06/04/21 1500          Modified Rip Scale    Modified Rip Scale  4 - Moderately severe disability.  Unable to walk without assistance, and unable to attend to own bodily needs without assistance.  -CURLY     Row Name 06/04/21 1500          Functional Assessment    Outcome Measure Options  AM-PAC 6 Clicks Daily Activity (OT);Modified [place]  -LE       User Key  (r) = Recorded By, (t) = Taken By, (c) = Cosigned By    Initials Name Provider Type    Marianna Mcdonald OTR Occupational Therapist        Occupational Therapy Education                 Title: PT OT SLP Therapies (Done)     Topic: Occupational Therapy (Done)     Point: ADL training (Done)     Description:   Instruct learner(s) on proper safety adaptation and remediation techniques during self care or transfers.   Instruct in proper use of assistive devices.              Learning Progress Summary           Patient Acceptance, E,D, VU by CURLY at 6/4/2021 1501    Comment: role of OT,plan of care, fall risk, use of call light and call to get up. ed benefit of activity and xfer tech.   Caregiver Acceptance, E,D, VU by CURLY at 6/4/2021 1501    Comment: role of OT,plan of care, fall risk, use of call light and call to get up. ed benefit of activity and xfer tech.                   Point: Precautions (Done)     Description:   Instruct learner(s) on prescribed precautions during self-care and functional transfers.              Learning Progress Summary           Patient Acceptance, E,D, VU by CURLY at 6/4/2021 1501    Comment: role of OT,plan of care, fall risk, use of call light and call to get up. ed benefit of activity and xfer tech.   Caregiver Acceptance, E,D, VU by CURLY at 6/4/2021 1501    Comment: role of OT,plan of care, fall risk, use of call light and call to get up. ed benefit of activity and xfer tech.                    Point: Body mechanics (Done)     Description:   Instruct learner(s) on proper positioning and spine alignment during self-care, functional mobility activities and/or exercises.              Learning Progress Summary           Patient Acceptance, E,D, VU by CURLY at 6/4/2021 1501    Comment: role of OT,plan of care, fall risk, use of call light and call to get up. ed benefit of activity and xfer tech.   Caregiver Acceptance, E,D, VU by CURLY at 6/4/2021 1501    Comment: role of OT,plan of care, fall risk, use of call light and call to get up. ed benefit of activity and xfer tech.                               User Key     Initials Effective Dates Name Provider Type Discipline     06/08/18 -  Marianna White, OTR Occupational Therapist OT              OT Recommendation and Plan  Planned Therapy Interventions (OT): activity tolerance training, adaptive equipment training, BADL retraining, occupation/activity based interventions, patient/caregiver education/training, transfer/mobility retraining  Therapy Frequency (OT): 3 times/wk  Plan of Care Review  Plan of Care Reviewed With: patient, caregiver  Outcome Summary: Pt presents from home where pt lives with full time caregivers with fatigue, low blood sugar. Imaging negative for CVA. Pt caregiver present throughout session and pt is assist of 1 and encouragement to get OOB, ambulate to/from bathroom at walker level. Pt with chronic impaired B shld but able to feed self.   Pt may benefit from skilled OT to increase safety and independence with plan to return home with caregivers.     Time Calculation:   Time Calculation- OT     Row Name 06/04/21 1502 06/04/21 0942          Time Calculation- OT    OT Start Time  1420  -LE  --     OT Stop Time  1440  -LE  --     OT Time Calculation (min)  20 min  -LE  --     Total Timed Code Minutes- OT  12 minute(s)  -LE  --     OT Received On  06/04/21  -LE  --     OT - Next Appointment  06/07/21  -LE  06/05/21  -LE     OT Goal Re-Cert Due Date   06/18/21  -LE  --        Timed Charges    94001 - OT Self Care/Mgmt Minutes  12  -LE  --        Untimed Charges    OT Eval/Re-eval Minutes  8  -LE  --        Total Minutes    Timed Charges Total Minutes  12  -LE  --     Untimed Charges Total Minutes  8  -LE  --      Total Minutes  20  -LE  --       User Key  (r) = Recorded By, (t) = Taken By, (c) = Cosigned By    Initials Name Provider Type    Marianna Mcdonald OTR Occupational Therapist        Therapy Charges for Today     Code Description Service Date Service Provider Modifiers Qty    [id number]  OT EVAL MOD COMPLEXITY 2 6/4/2021 Marianna White OTR GO 1    [id number]  OT SELF CARE/MGMT/TRAIN EA 15 MIN 6/4/2021 Marianna White OTR GO 1               DALE Moreno  6/4/2021

## 2021-06-04 NOTE — THERAPY EVALUATION
Acute Care - Speech Language Pathology   Swallow Initial Evaluation [facility]     Patient Name: Denny Ivy  : 8/3/1929  MRN: [id number]  Today's Date: 2021               Admit Date: 2021    Visit Dx:     ICD-10-CM ICD-9-CM   1. Generalized weakness  R53.1 780.79   2. Acute renal failure, unspecified acute renal failure type (CMS/[facility])  N17.9 584.9   3. Hyperkalemia  E87.5 276.7     Patient Active Problem List   Diagnosis   • PRABHU treated with autoBiPAP   • Hypersomnia due to medical condition   • Chronic congestive heart failure (CMS/[facility])   • HTN (hypertension)   • Type 2 diabetes mellitus, with long-term current use of insulin (CMS/[facility])   • CKD (chronic kidney disease) stage 3, GFR 30-59 ml/min (CMS/[facility])   • COLLIN (acute kidney injury) (CMS/[facility])   • Generalized weakness   • Hypotension   • Weight loss   • Anemia   • Hyperkalemia   • Chronic diastolic CHF (congestive heart failure) (CMS/[facility])     Past Medical History:   Diagnosis Date   • Arthritis    • Diabetes 1.5, managed as type 2 (CMS/[facility])    • Edema    • Hypertension    • Hypothyroidism    • Low back pain    • Macular degeneration      Past Surgical History:   Procedure Laterality Date   • CARDIAC SURGERY     • CHOLECYSTECTOMY     • HIATAL HERNIA REPAIR     • HYSTERECTOMY          SWALLOW EVALUATION (last 72 hours)      SLP Adult Swallow Evaluation     Row Name 21 1300                   Rehab Evaluation    Document Type  evaluation  -LT        Subjective Information  no complaints  -LT        Patient Observations  alert;cooperative;agree to therapy  -LT        Patient/Family/Caregiver Comments/Observations  -- caregiver present  -LT        Care Plan Review  evaluation/treatment results reviewed;care plan/treatment goals reviewed;risks/benefits reviewed;current/potential barriers reviewed;patient/other agree to care plan  -LT        Patient Effort  good  -LT        Symptoms Noted During/After Treatment  none  -LT           General  Information    Patient Profile Reviewed  yes  -LT        Pertinent History Of Current Problem  89 yo F w/AMS. Hx: DM, HTN, CHF  -LT        Current Method of Nutrition  regular textures;thin liquids  -LT        Prior Level of Function-Swallowing  no diet consistency restrictions  -LT        Plans/Goals Discussed with  patient;agreed upon  -LT        Barriers to Rehab  none identified  -LT           Pain Scale: Numbers Pre/Post-Treatment    Pretreatment Pain Rating  0/10 - no pain  -LT        Posttreatment Pain Rating  0/10 - no pain  -LT           Oral Motor Structure and Function    Dentition Assessment  natural, present and adequate  -LT        Secretion Management  WNL/WFL  -LT        Volitional Swallow  WFL  -LT        Volitional Cough  WFL  -LT           Oral Musculature and Cranial Nerve Assessment    Oral Motor General Assessment  generalized oral motor weakness  -LT           Clinical Swallow Eval    Clinical Swallow Evaluation Summary  bedside swallow eval completed. no overt s/s of aspiration with thin, puree, mech soft, reg solids. mastication slow but fxnl. REC: reg diet/thins, meds with thin or puree as tolerated, up at 90', small bites/drinks. SLP to s/o.  -LT           Clinical Impression    SLP Swallowing Diagnosis  functional pharyngeal phase  -LT        Functional Impact  no impact on function  -LT        Rehab Potential/Prognosis, Swallowing  good, to achieve stated therapy goals  -LT        Swallow Criteria for Skilled Therapeutic Interventions Met  no problems identified which require skilled intervention  -LT           Recommendations    Therapy Frequency (Swallow)  evaluation only  -LT        SLP Diet Recommendation  regular textures;thin liquids  -LT        Recommended Precautions and Strategies  upright posture during/after eating;small bites of food and sips of liquid  -LT        Oral Care Recommendations  Oral Care BID/PRN  -LT        SLP Rec. for Method of Medication Administration  meds  whole;with thin liquids;with pudding or applesauce;as tolerated  -LT        Monitor for Signs of Aspiration  yes;notify SLP if any concerns  -LT           Swallow Goals (SLP)    Oral Nutrition/Hydration Goal Selection (SLP)  --  -LT          User Key  (r) = Recorded By, (t) = Taken By, (c) = Cosigned By    Initials Name Effective Dates    LT Mindi Sharif MS CCC-SLP 06/08/18 -           EDUCATION  The patient has been educated in the following areas:   Dysphagia (Swallowing Impairment).    SLP Recommendation and Plan  SLP Swallowing Diagnosis: functional pharyngeal phase  SLP Diet Recommendation: regular textures, thin liquids  Recommended Precautions and Strategies: upright posture during/after eating, small bites of food and sips of liquid  SLP Rec. for Method of Medication Administration: meds whole, with thin liquids, with pudding or applesauce, as tolerated     Monitor for Signs of Aspiration: yes, notify SLP if any concerns     Swallow Criteria for Skilled Therapeutic Interventions Met: no problems identified which require skilled intervention     Rehab Potential/Prognosis, Swallowing: good, to achieve stated therapy goals  Therapy Frequency (Swallow): evaluation only                            Plan of Care Reviewed With: patient, caregiver  Outcome Summary: bedside swallow eval completed. no overt s/s of aspiration with thin, puree, mech soft, reg solids. mastication slow but fxnl. REC: reg diet/thins, meds with thin or puree as tolerated, up at 90', small bites/drinks. SLP to s/o.         SLP Outcome Measures (last 72 hours)      SLP Outcome Measures     Row Name 06/04/21 1300             SLP Outcome Measures    Outcome Measure Used?  Adult NOMS  -LT         Adult FCM Scores    FCM Chosen  Swallowing  -LT      Swallowing FCM Score  7  -LT        User Key  (r) = Recorded By, (t) = Taken By, (c) = Cosigned By    Initials Name Effective Dates    LT Mindi Sharif MS CCC-SLP 06/08/18 -            Time  Calculation:   Time Calculation- SLP     Row Name 06/04/21 1335             Time Calculation- SLP    SLP Received On  06/04/21  -LT        User Key  (r) = Recorded By, (t) = Taken By, (c) = Cosigned By    Initials Name Provider Type    LT Mindi Sharif, MS CCC-SLP Speech and Language Pathologist          Therapy Charges for Today     Code Description Service Date Service Provider Modifiers Qty    [id number] HC ST EVAL ORAL PHARYNG SWALLOW 2 6/4/2021 Mindi Sharif MS CCC-SLP GN 1               Mindi Sharif MS CCC-JUVENAL  6/4/2021

## 2021-06-04 NOTE — CONSULTS
"Access Center Consult    Full PPE including mask and eyewear worn throughout encounter. Patient without mask. Appropriate hand hygiene performed before and after  Patient contact and donning/doffing PPE.    Met with patient in room 557.  On approach pt was lying in bed with caregiver at bedside. With patients permission, introduced sefl and explained role with caregiver in room. With patients permission, caregiver stayed at bedside during interview. All information per patient unless otherwise noted.     Pt is pleasant and cooperative 89 W/B/F who is very hard of hearing. Her   one month ago and her family was concerned that she was grieving. She has 3 kids and raised 5. She has several grandchildren. When asked who her support system included she responded \"all of them.\" Pt has a caregiver 24 hours a day.     Access center consulted for depression. Per her nurse, she recently lost her  and they were concerned for increase in depression. The patient reports that she is not depressed. She states that she misses him but its part of life. Pt rates her depression 0/10 and anxiety 0/10. She is currently taking Wellbutrin and is content with it. She does not want to change it. She does not want any outpatient therapy and she does not want to see a psychiatrist. Pt denies feeling suicidal or wishing she was dead.    Access will sign off but if patient changes her mind to let us know and we will be happy to come back.      "

## 2021-06-05 ENCOUNTER — APPOINTMENT (OUTPATIENT)
Dept: CARDIOLOGY | Facility: HOSPITAL | Age: 86
End: 2021-06-05

## 2021-06-05 LAB
ANION GAP SERPL CALCULATED.3IONS-SCNC: 8.7 MMOL/L (ref 5–15)
BH CV ECHO MEAS - DIST REN A EDV LEFT: 11.6 CM/SEC
BH CV ECHO MEAS - DIST REN A PSV LEFT: 80.1 CM/SEC
BH CV ECHO MEAS - DIST REN A RI LEFT: 0.86
BH CV ECHO MEAS - MID REN A EDV LEFT: 3.1 CM/SEC
BH CV ECHO MEAS - MID REN A PSV LEFT: 72.6 CM/SEC
BH CV ECHO MEAS - MID REN A RI LEFT: 0.96
BH CV ECHO MEAS - PROX REN A EDV LEFT: 13.1 CM/SEC
BH CV ECHO MEAS - PROX REN A PSV LEFT: 84.5 CM/SEC
BH CV ECHO MEAS - PROX REN A RI LEFT: 0.84
BH CV VAS BP LEFT ARM: NORMAL MMHG
BH CV VAS BP RIGHT ARM: NORMAL MMHG
BH CV VAS RENAL AORTIC MID PSV: 99.3 CM/S
BH CV VAS RENAL HILUM LEFT EDV: 5.16 CM/S
BH CV VAS RENAL HILUM LEFT PSV: 27.9 CM/S
BH CV VAS RENAL HILUM RIGHT EDV: 4.41 CM/S
BH CV VAS RENAL HILUM RIGHT PSV: 22.1 CM/S
BH CV XLRA MEAS - KID L LEFT: 9.91 CM
BH CV XLRA MEAS DIST REN A EDV RIGHT: 13.8 CM/SEC
BH CV XLRA MEAS DIST REN A PSV RIGHT: 77.8 CM/SEC
BH CV XLRA MEAS DIST REN A RI RIGHT: 0.82
BH CV XLRA MEAS KID L RIGHT: 10.8 CM
BH CV XLRA MEAS KID W RIGHT: 5.4 CM
BH CV XLRA MEAS MID REN A EDV RIGHT: 2.8 CM/SEC
BH CV XLRA MEAS MID REN A PSV RIGHT: 90.2 CM/SEC
BH CV XLRA MEAS MID REN A RI RIGHT: 0.97
BH CV XLRA MEAS PROX REN A EDV RIGHT: 9.1 CM/SEC
BH CV XLRA MEAS PROX REN A PSV RIGHT: 88 CM/SEC
BH CV XLRA MEAS PROX REN A RI RIGHT: 0.9
BH CV XLRA MEAS RAR LEFT: 0.9
BH CV XLRA MEAS RAR RIGHT: 0.9
BH CV XLRA MEAS RENAL A ORG EDV LEFT: 8.13 CM/S
BH CV XLRA MEAS RENAL A ORG EDV RIGHT: 2.59 CM/S
BH CV XLRA MEAS RENAL A ORG PSV LEFT: 88.8 CM/S
BH CV XLRA MEAS RENAL A ORG PSV RIGHT: 77.8 CM/S
BUN SERPL-MCNC: 32 MG/DL (ref 8–23)
BUN/CREAT SERPL: 26.7 (ref 7–25)
CALCIUM SPEC-SCNC: 9.4 MG/DL (ref 8.2–9.6)
CHLORIDE SERPL-SCNC: 101 MMOL/L (ref 98–107)
CO2 SERPL-SCNC: 30.3 MMOL/L (ref 22–29)
CREAT SERPL-MCNC: 1.2 MG/DL (ref 0.57–1)
GFR SERPL CREATININE-BSD FRML MDRD: 51 ML/MIN/1.73
GLUCOSE BLDC GLUCOMTR-MCNC: 104 MG/DL (ref 70–130)
GLUCOSE BLDC GLUCOMTR-MCNC: 110 MG/DL (ref 70–130)
GLUCOSE BLDC GLUCOMTR-MCNC: 139 MG/DL (ref 70–130)
GLUCOSE BLDC GLUCOMTR-MCNC: 139 MG/DL (ref 70–130)
GLUCOSE BLDC GLUCOMTR-MCNC: 300 MG/DL (ref 70–130)
GLUCOSE BLDC GLUCOMTR-MCNC: 90 MG/DL (ref 70–130)
GLUCOSE SERPL-MCNC: 88 MG/DL (ref 65–99)
LEFT KIDNEY WIDTH: 4.75 CM
LEFT RENAL UPPER PARENCHYMA MAX: 18.5 CM/S
POTASSIUM SERPL-SCNC: 4.2 MMOL/L (ref 3.5–5.2)
RIGHT RENAL UPPER PARENCHYMA MAX: 15.9 CM/S
RIGHT RENAL UPPER PARENCHYMA MIN: 5.07 CM/S
RIGHT RENAL UPPER PARENCHYMA RI: 0.68
SODIUM SERPL-SCNC: 140 MMOL/L (ref 136–145)

## 2021-06-05 PROCEDURE — 80048 BASIC METABOLIC PNL TOTAL CA: CPT | Performed by: NURSE PRACTITIONER

## 2021-06-05 PROCEDURE — 99232 SBSQ HOSP IP/OBS MODERATE 35: CPT | Performed by: INTERNAL MEDICINE

## 2021-06-05 PROCEDURE — 63710000001 INSULIN GLARGINE PER 5 UNITS: Performed by: NURSE PRACTITIONER

## 2021-06-05 PROCEDURE — 63710000001 INSULIN LISPRO (HUMAN) PER 5 UNITS: Performed by: NURSE PRACTITIONER

## 2021-06-05 PROCEDURE — 82962 GLUCOSE BLOOD TEST: CPT

## 2021-06-05 PROCEDURE — 93975 VASCULAR STUDY: CPT

## 2021-06-05 PROCEDURE — 25010000002 ENOXAPARIN PER 10 MG: Performed by: INTERNAL MEDICINE

## 2021-06-05 RX ADMIN — ISOSORBIDE MONONITRATE 120 MG: 60 TABLET ORAL at 08:49

## 2021-06-05 RX ADMIN — DILTIAZEM HYDROCHLORIDE 300 MG: 180 CAPSULE, COATED, EXTENDED RELEASE ORAL at 08:49

## 2021-06-05 RX ADMIN — SPIRONOLACTONE 25 MG: 25 TABLET ORAL at 08:49

## 2021-06-05 RX ADMIN — HYDRALAZINE HYDROCHLORIDE 100 MG: 50 TABLET, FILM COATED ORAL at 15:06

## 2021-06-05 RX ADMIN — ATORVASTATIN CALCIUM 80 MG: 80 TABLET, FILM COATED ORAL at 21:14

## 2021-06-05 RX ADMIN — BUPROPION HYDROCHLORIDE 75 MG: 75 TABLET, FILM COATED ORAL at 08:50

## 2021-06-05 RX ADMIN — TORSEMIDE 40 MG: 20 TABLET ORAL at 08:49

## 2021-06-05 RX ADMIN — INSULIN LISPRO 10 UNITS: 100 INJECTION, SOLUTION INTRAVENOUS; SUBCUTANEOUS at 16:39

## 2021-06-05 RX ADMIN — Medication 1000 UNITS: at 08:50

## 2021-06-05 RX ADMIN — ASPIRIN 325 MG: 325 TABLET ORAL at 08:50

## 2021-06-05 RX ADMIN — HYDRALAZINE HYDROCHLORIDE 100 MG: 50 TABLET, FILM COATED ORAL at 21:15

## 2021-06-05 RX ADMIN — INSULIN GLARGINE 10 UNITS: 100 INJECTION, SOLUTION SUBCUTANEOUS at 21:14

## 2021-06-05 RX ADMIN — HYDRALAZINE HYDROCHLORIDE 100 MG: 50 TABLET, FILM COATED ORAL at 05:31

## 2021-06-05 RX ADMIN — ENOXAPARIN SODIUM 30 MG: 100 INJECTION SUBCUTANEOUS at 16:39

## 2021-06-05 RX ADMIN — SODIUM CHLORIDE, PRESERVATIVE FREE 10 ML: 5 INJECTION INTRAVENOUS at 23:49

## 2021-06-05 RX ADMIN — BUPROPION HYDROCHLORIDE 75 MG: 75 TABLET, FILM COATED ORAL at 21:14

## 2021-06-05 RX ADMIN — METOPROLOL SUCCINATE 150 MG: 100 TABLET, EXTENDED RELEASE ORAL at 21:15

## 2021-06-05 RX ADMIN — ACETAMINOPHEN 650 MG: 325 TABLET, FILM COATED ORAL at 21:25

## 2021-06-05 RX ADMIN — CLOPIDOGREL 75 MG: 75 TABLET, FILM COATED ORAL at 08:50

## 2021-06-05 NOTE — PROGRESS NOTES
[facility]    PROGRESS NOTE    Name:  Denny Ivy   Age:  89 y.o.  Sex:  female  :  8/3/1929  MRN:  [id number]   Visit Number:  [id number]  Admission Date:  2021  Date Of Service:  21  Primary Care Physician:  Мария Wilks MD     LOS: 5 days :  Patient Care Team:  Мария Wilks MD as PCP - General (Internal Medicine)  Мария Wilks MD as PCP - Internal Medicine:    History taken from:     patient chart    Chief Complaint:      Weakness    Subjective     Interval History:     Patient seen and examined 2021.    89-year-old female with type 2 diabetes, hypertension, hypothyroidism, diastolic CHF, chronic kidney disease was discharged from the hospital 2021 after being admitted for congestive heart failure.  She went from 190 pounds down to 180.  She continued not to eat after being discharged and was down to 168 pounds.  Creatinine was up to 1.76.  Diuretics were held, IV fluids were given and the creatinine came down.  Diuretics have been restarted, creatinine appears to be stable around 1.2.      Unfortunately her blood pressure had gone up as well.  Cardiology is following.  Patient is currently on Cardizem CD which has been increased, Demadex, Apresoline, Imdur, Toprol-XL, and now Aldactone.  Renal artery duplex has been ordered.  This is to be done today.  Blood pressure today is better, though still at 184/80.    Patient appeared to have some confusion on 6/3/2021, so rapid response was called.  CT of the brain was done and neurology was consulted.  CT of the brain did not show any acute intracranial pathology.  MRI and CTA of the head neck were ordered.  These did not reveal any significant blockage or CVA.  Thyroid cyst were noted.  Neurology recommends dual antiplatelet therapy for 21 days and then aspirin daily.  Lipitor is continued.  Patient does have a 3 mm osteoma or calcified meningioma which is unchanged.  They have signed  off.    Speech therapy saw the patient and recommended regular diet with thin liquids and signed off.  PT and OT are following.    Patient appears stable today.  She denies any chest pressure, shortness of breath, nausea, vomiting, or pain.  She appears to be improving.  Await results of renal study and stabilization of her blood pressure.    Spoke to Granddaughter, answered all questions.  She has several questions about goal of care specifically with blood pressure.  Encouraged her to speak with cardiology, as they will be following her as an outpatient and many of her questions were regarding goals of treatment, and follow-up as an outpatient especially when the patient goes home.  In regards to current care, informed the daughter we are continuing with PT, OT, speech therapy, DVT prophylaxis, however despite these best efforts to prevent complications, these can still happen in a patient with advanced age and multiple complicating medical issues.  The whole case was gone over with the granddaughter in layman's terms and all questions were answered.  She voiced understanding to this.  Will call her back again tomorrow.    Review of Systems:     All systems were reviewed and negative except for:  Musculoskeletal: positive for  muscle weakness    Objective     Vital Signs:    Temp:  [97.6 °F (36.4 °C)-98.7 °F (37.1 °C)] 97.6 °F (36.4 °C)  Heart Rate:  [70-78] 75  Resp:  [16-18] 18  BP: (151-184)/(66-80) 184/80    Physical Exam:    General: Alert and oriented x3, mild distress.  Heart: Regular rate and rhythm without murmur rub or thrill.  Lungs: Decreased breath sounds bilaterally without use of accessory muscles respiration.  Abdomen: Soft/nontender/nondistended.  No HSM noted.  MSK: 4/5 strength in upper/lower extremities bilaterally.     Results Review:      I reviewed the patient's new clinical results.  I reviewed the patient's new imaging results and agree with the interpretation.  I reviewed the patient's  other test results and agree with the interpretation    Labs:    Lab Results (last 24 hours)     Procedure Component Value Units Date/Time    POC Glucose Once [[id number]]  (Abnormal) Collected: 06/05/21 1105    Specimen: Blood Updated: 06/05/21 1106     Glucose 139 mg/dL     Basic Metabolic Panel [[id number]]  (Abnormal) Collected: 06/05/21 0521    Specimen: Blood Updated: 06/05/21 0700     Glucose 88 mg/dL      BUN 32 mg/dL      Creatinine 1.20 mg/dL      Sodium 140 mmol/L      Potassium 4.2 mmol/L      Comment: Slight hemolysis detected by analyzer. Results may be affected.        Chloride 101 mmol/L      CO2 30.3 mmol/L      Calcium 9.4 mg/dL      eGFR  African Amer 51 mL/min/1.73      BUN/Creatinine Ratio 26.7     Anion Gap 8.7 mmol/L     Narrative:      GFR Normal >60  Chronic Kidney Disease <60  Kidney Failure <15      POC Glucose Once [[id number]]  (Normal) Collected: 06/05/21 0531    Specimen: Blood Updated: 06/05/21 0532     Glucose 104 mg/dL     POC Glucose Once [[id number]]  (Normal) Collected: 06/05/21 0054    Specimen: Blood Updated: 06/05/21 0055     Glucose 110 mg/dL     POC Glucose Once [[id number]]  (Abnormal) Collected: 06/04/21 2140    Specimen: Blood Updated: 06/04/21 2142     Glucose 137 mg/dL     POC Glucose Once [[id number]]  (Abnormal) Collected: 06/04/21 1715    Specimen: Blood Updated: 06/04/21 1718     Glucose 205 mg/dL            Radiology:    Imaging Results (Last 24 Hours)     Procedure Component Value Units Date/Time    CT Angiogram Head [[id number]] Collected: 06/04/21 1249     Updated: 06/04/21 1701    Narrative:      CT ANGIOGRAM NECK AND HEAD WITH CONTRAST     HISTORY: TIA, confusion, altered mental status.     Initially, noncontrasted CT examination of brain was performed. There is  no evidence of intracranial hemorrhage, hydrocephalus or of abnormal  extra-axial fluid. Moderate small vessel ischemic disease is noted. Area  of dural calcification is noted lateral to the left frontal  lobe  measuring 3 x 4 mm in size consistent with a small meningioma.     A CT angiogram of the neck and head was then performed. Multiplanar as  well as 3-dimensional reconstructions were generated.     FINDINGS: The great vessels are arranged in a classic configuration.  Atherosclerotic disease is appreciated involving the origins of the  great vessels but without stenosis. There is 0% stenosis involving the  carotid bifurcations using NASCET criteria. The distal aspects of the  internal carotid arteries demonstrate moderate vascular calcification  with no evidence of a focal high-grade stenosis. The proximal aspects of  the anterior and middle cerebral arteries appear unremarkable.     Both vertebral arteries were opacified. The left vertebral artery is  slightly larger than that of the right. A small sal of vascular  calcification is noted at the origin of the left vertebral artery. A  mild to moderate stenosis of the left vertebral artery origin could not  be excluded. There is no evidence of a focal high-grade stenosis of the  cervical segments of the vertebral arteries. The basilar artery and the  proximal aspects of the posterior cerebral arteries appear unremarkable.     The thyroid glands are heterogeneous with multiple cysts present  bilaterally the largest of which are complex and septated. The largest  cyst involves the right lobe of thyroid and measures approximately 2.6  cm in size. These are new versus the CT examination of the chest  performed on 04/04/2009. Sagittal reconstructions demonstrate severe  loss of disc height at C4-5 and moderate loss of disc height at C3-4.  There is grade 1 anterolisthesis of C2 upon C3 estimated to be  approximately 4 mm, 2 mm of anterolisthesis of C3 upon C4 and 3-4 mm of  anterolisthesis of C4 upon C5. Anterior bridging osteophyte is present  at C5-6.       Impression:      1.  No evidence of focal high-grade intracranial stenosis or aneurysm  proximally. There is  0% stenosis involving the carotid bifurcations  using NASCET criteria. See above.  2.  Multiple cysts are noted involving the thyroid bilaterally new  versus 04/04/2009 with the largest measuring approximately 2.6 cm in  diameter and demonstrating thin septations centrally.        Radiation dose reduction techniques were utilized, including automated  exposure control and exposure modulation based on body size.     This report was finalized on 6/4/2021 4:58 PM by Dr. Stanton Hoyos M.D.       CT Angiogram Neck [[id number]] Collected: 06/04/21 1249     Updated: 06/04/21 1701    Narrative:      CT ANGIOGRAM NECK AND HEAD WITH CONTRAST     HISTORY: TIA, confusion, altered mental status.     Initially, noncontrasted CT examination of brain was performed. There is  no evidence of intracranial hemorrhage, hydrocephalus or of abnormal  extra-axial fluid. Moderate small vessel ischemic disease is noted. Area  of dural calcification is noted lateral to the left frontal lobe  measuring 3 x 4 mm in size consistent with a small meningioma.     A CT angiogram of the neck and head was then performed. Multiplanar as  well as 3-dimensional reconstructions were generated.     FINDINGS: The great vessels are arranged in a classic configuration.  Atherosclerotic disease is appreciated involving the origins of the  great vessels but without stenosis. There is 0% stenosis involving the  carotid bifurcations using NASCET criteria. The distal aspects of the  internal carotid arteries demonstrate moderate vascular calcification  with no evidence of a focal high-grade stenosis. The proximal aspects of  the anterior and middle cerebral arteries appear unremarkable.     Both vertebral arteries were opacified. The left vertebral artery is  slightly larger than that of the right. A small sal of vascular  calcification is noted at the origin of the left vertebral artery. A  mild to moderate stenosis of the left vertebral artery origin could  not  be excluded. There is no evidence of a focal high-grade stenosis of the  cervical segments of the vertebral arteries. The basilar artery and the  proximal aspects of the posterior cerebral arteries appear unremarkable.     The thyroid glands are heterogeneous with multiple cysts present  bilaterally the largest of which are complex and septated. The largest  cyst involves the right lobe of thyroid and measures approximately 2.6  cm in size. These are new versus the CT examination of the chest  performed on 04/04/2009. Sagittal reconstructions demonstrate severe  loss of disc height at C4-5 and moderate loss of disc height at C3-4.  There is grade 1 anterolisthesis of C2 upon C3 estimated to be  approximately 4 mm, 2 mm of anterolisthesis of C3 upon C4 and 3-4 mm of  anterolisthesis of C4 upon C5. Anterior bridging osteophyte is present  at C5-6.       Impression:      1.  No evidence of focal high-grade intracranial stenosis or aneurysm  proximally. There is 0% stenosis involving the carotid bifurcations  using NASCET criteria. See above.  2.  Multiple cysts are noted involving the thyroid bilaterally new  versus 04/04/2009 with the largest measuring approximately 2.6 cm in  diameter and demonstrating thin septations centrally.        Radiation dose reduction techniques were utilized, including automated  exposure control and exposure modulation based on body size.     This report was finalized on 6/4/2021 4:58 PM by Dr. Stanton Hoyos M.D.             Medication Review:     aspirin, 325 mg, Oral, Daily   Or  aspirin, 300 mg, Rectal, Daily  atorvastatin, 80 mg, Oral, Nightly  buPROPion, 75 mg, Oral, Q12H  cholecalciferol, 1,000 Units, Oral, Daily  clopidogrel, 75 mg, Oral, Daily  dilTIAZem CD, 300 mg, Oral, Q24H  enoxaparin, 30 mg, Subcutaneous, Q24H  hydrALAZINE, 100 mg, Oral, Q8H  insulin glargine, 10 Units, Subcutaneous, Nightly  insulin lispro, 0-14 Units, Subcutaneous, TID AC  isosorbide mononitrate, 120  mg, Oral, Daily  metoprolol succinate XL, 150 mg, Oral, Nightly  sodium chloride, 10 mL, Intravenous, Q12H  sodium chloride, 10 mL, Intravenous, Q12H  spironolactone, 25 mg, Oral, Daily  torsemide, 40 mg, Oral, Daily             Assessment/Plan     Principal Problem:    Generalized weakness  Active Problems:    PRABHU treated with autoBiPAP    HTN (hypertension)    Type 2 diabetes mellitus, with long-term current use of insulin (CMS/[facility])    CKD (chronic kidney disease) stage 3, GFR 30-59 ml/min (CMS/[facility])    COLLIN (acute kidney injury) (CMS/[facility])    Hypotension    Weight loss    Anemia    Hyperkalemia    Chronic diastolic CHF (congestive heart failure) (CMS/[facility])      1.  Hypotension, likely due to overdiuresis.  This is resolved.  2.  Hypertension, uncontrolled, continue to monitor.  3.  Diabetes mellitus type 2, blood sugar stable.  4.  Chronic kidney disease stage III  5.  PRABHU  6.  Metabolic encephalopathy, likely due to low blood pressure, resolved.  7.  3 mm left frontal osteoma or meningioma, stable.  8.  Brief metabolic encephalopathy, stroke ruled out.  Neurology has recommended dual antiplatelet therapy aspirin/Plavix for 21 days.  9.  Depression, suspect reactive  10.  Multiple thyroid cysts.  Ultrasound as an outpatient.  11.  Left leg pain, Doppler negative.  12.  Hyperlipidemia, on Lipitor.    Plan:      Continue blood pressure medications per cardiology.  Monitor blood pressure, slowly improving.  Renal duplex today.  Monitor lab work.  Monitor blood sugars, these are stable.  Neurology has signed off.  Lovenox added for DVT prophylaxis.  Previous Doppler was negative.  PT working with patient.  Anticipate home once blood pressure is controlled.  Further recommendations will depend on the clinical course.    Geoffrey Potts,   06/05/21  11:45 EDT

## 2021-06-05 NOTE — PROGRESS NOTES
[facility]  Progress note: 2021    Patient Identification:  Name:Denny Ivy  Age:89 y.o.  Sex: female  :  8/3/1929  MRN: [id number]           CC:  Severe hypertension diastolic heart failure    Interval history:  Significant diuresis overnight.  Blood pressure still elevated.  Renal labs slightly worse. Dyspnea is better    Vital Signs:   Temp:  [97.6 °F (36.4 °C)-98.7 °F (37.1 °C)] 97.6 °F (36.4 °C)  Heart Rate:  [70-78] 75  Resp:  [16-18] 18  BP: (151-184)/(66-80) 184/80    Intake/Output Summary (Last 24 hours) at 2021 1413  Last data filed at 2021 0500  Gross per 24 hour   Intake 120 ml   Output 2300 ml   Net -2180 ml       Physical Examination:    General Appearance No acute distress   Neck No adenopathy, supple, trachea midline, no thyromegaly, no carotid bruit, no JVD   Lungs Clear to auscultation,respirations regular, even and unlabored   Heart Regular rhythm and normal rate, normal S1 and S2, no murmur, no gallop, no rub, no click   Chest wall No abnormalities observed   Abdomen Normal bowel sounds, no masses, no hepatomegaly, soft   Extremities Moves all extremities well, trace edema, no cyanosis, no redness   Neurological Alert and oriented x 3     Lab Review:  Personally reviewed the labs, radiology imaging and other cardiac procedures.   Results from last 7 days   Lab Units 21  0521 21  0601   SODIUM mmol/L 140 140   POTASSIUM mmol/L 4.2 4.0   CHLORIDE mmol/L 101 108*   CO2 mmol/L 30.3* 28.5   BUN mg/dL 32* 36*   CREATININE mg/dL 1.20* 1.04*   CALCIUM mg/dL 9.4 8.8   BILIRUBIN mg/dL  --  0.3   ALK PHOS U/L  --  46   ALT (SGPT) U/L  --  20   AST (SGOT) U/L  --  12   GLUCOSE mg/dL 88 119*         Results from last 7 days   Lab Units 21  0549 21  0450 21  0601   WBC 10*3/mm3 4.81 4.44 4.22   HEMOGLOBIN g/dL 10.3* 10.1* 9.6*   HEMATOCRIT % 31.8* 31.4* 29.4*   PLATELETS 10*3/mm3 182 160 162         Medication Review:   Meds reviewed  Scheduled  Meds:aspirin, 325 mg, Oral, Daily   Or  aspirin, 300 mg, Rectal, Daily  atorvastatin, 80 mg, Oral, Nightly  buPROPion, 75 mg, Oral, Q12H  cholecalciferol, 1,000 Units, Oral, Daily  clopidogrel, 75 mg, Oral, Daily  dilTIAZem CD, 300 mg, Oral, Q24H  enoxaparin, 30 mg, Subcutaneous, Q24H  hydrALAZINE, 100 mg, Oral, Q8H  insulin glargine, 10 Units, Subcutaneous, Nightly  insulin lispro, 0-14 Units, Subcutaneous, TID AC  isosorbide mononitrate, 120 mg, Oral, Daily  metoprolol succinate XL, 150 mg, Oral, Nightly  sodium chloride, 10 mL, Intravenous, Q12H  sodium chloride, 10 mL, Intravenous, Q12H  spironolactone, 25 mg, Oral, Daily  torsemide, 40 mg, Oral, Daily      I personally viewed and interpreted the patient's EKG/Telemetry data    Assessment and Plan  1.  Dyspnea on exertion, improved with diuresis  2.  Acute diastolic heart failure secondary to hypertensive urgency  3.  Poorly controlled hypertension. Add clonidine if still elevated tomorrow as neelam as no signifcant bradycardia  4.  Chronic renal insufficiency  5.  Obstructive sleep apnea   6.  Diabetes  7.  Acute mental status change      Mini Viera  6/5/202114:13 EDT  25min spent in reviewing records, discussion and examination of the patient and discussion with other members of the patient's medical team.     Dictated utilizing Dragon dictation

## 2021-06-05 NOTE — PLAN OF CARE
Goal Outcome Evaluation:  Plan of Care Reviewed With: patient     Outcome Summary: patient alert d/o to time, family at bedside, renal doppler study completed, elevated BP at times, no c/o pain, tolerating diet, accucheck achs

## 2021-06-06 LAB
ALBUMIN SERPL-MCNC: 3 G/DL (ref 3.5–5.2)
ANION GAP SERPL CALCULATED.3IONS-SCNC: 9 MMOL/L (ref 5–15)
BUN SERPL-MCNC: 37 MG/DL (ref 8–23)
BUN/CREAT SERPL: 25.3 (ref 7–25)
CALCIUM SPEC-SCNC: 9.1 MG/DL (ref 8.2–9.6)
CHLORIDE SERPL-SCNC: 102 MMOL/L (ref 98–107)
CO2 SERPL-SCNC: 29 MMOL/L (ref 22–29)
CREAT SERPL-MCNC: 1.46 MG/DL (ref 0.57–1)
DEPRECATED RDW RBC AUTO: 43.3 FL (ref 37–54)
ERYTHROCYTE [DISTWIDTH] IN BLOOD BY AUTOMATED COUNT: 13.1 % (ref 12.3–15.4)
GFR SERPL CREATININE-BSD FRML MDRD: 41 ML/MIN/1.73
GLUCOSE BLDC GLUCOMTR-MCNC: 102 MG/DL (ref 70–130)
GLUCOSE BLDC GLUCOMTR-MCNC: 118 MG/DL (ref 70–130)
GLUCOSE BLDC GLUCOMTR-MCNC: 127 MG/DL (ref 70–130)
GLUCOSE BLDC GLUCOMTR-MCNC: 144 MG/DL (ref 70–130)
GLUCOSE BLDC GLUCOMTR-MCNC: 182 MG/DL (ref 70–130)
GLUCOSE SERPL-MCNC: 98 MG/DL (ref 65–99)
HCT VFR BLD AUTO: 31.6 % (ref 34–46.6)
HGB BLD-MCNC: 10.1 G/DL (ref 12–15.9)
MAGNESIUM SERPL-MCNC: 1.9 MG/DL (ref 1.7–2.3)
MCH RBC QN AUTO: 29.4 PG (ref 26.6–33)
MCHC RBC AUTO-ENTMCNC: 32 G/DL (ref 31.5–35.7)
MCV RBC AUTO: 92.1 FL (ref 79–97)
PHOSPHATE SERPL-MCNC: 3.7 MG/DL (ref 2.5–4.5)
PLATELET # BLD AUTO: 186 10*3/MM3 (ref 140–450)
PMV BLD AUTO: 10.1 FL (ref 6–12)
POTASSIUM SERPL-SCNC: 4.2 MMOL/L (ref 3.5–5.2)
RBC # BLD AUTO: 3.43 10*6/MM3 (ref 3.77–5.28)
SODIUM SERPL-SCNC: 140 MMOL/L (ref 136–145)
WBC # BLD AUTO: 4.68 10*3/MM3 (ref 3.4–10.8)

## 2021-06-06 PROCEDURE — 82962 GLUCOSE BLOOD TEST: CPT

## 2021-06-06 PROCEDURE — 85027 COMPLETE CBC AUTOMATED: CPT | Performed by: INTERNAL MEDICINE

## 2021-06-06 PROCEDURE — 97110 THERAPEUTIC EXERCISES: CPT | Performed by: PHYSICAL THERAPIST

## 2021-06-06 PROCEDURE — 63710000001 INSULIN GLARGINE PER 5 UNITS: Performed by: NURSE PRACTITIONER

## 2021-06-06 PROCEDURE — 83735 ASSAY OF MAGNESIUM: CPT | Performed by: INTERNAL MEDICINE

## 2021-06-06 PROCEDURE — 80069 RENAL FUNCTION PANEL: CPT | Performed by: INTERNAL MEDICINE

## 2021-06-06 PROCEDURE — 99231 SBSQ HOSP IP/OBS SF/LOW 25: CPT | Performed by: NURSE PRACTITIONER

## 2021-06-06 PROCEDURE — 63710000001 INSULIN LISPRO (HUMAN) PER 5 UNITS: Performed by: NURSE PRACTITIONER

## 2021-06-06 PROCEDURE — 25010000002 ENOXAPARIN PER 10 MG: Performed by: INTERNAL MEDICINE

## 2021-06-06 RX ORDER — TORSEMIDE 20 MG/1
20 TABLET ORAL DAILY
Status: DISCONTINUED | OUTPATIENT
Start: 2021-06-07 | End: 2021-06-08 | Stop reason: HOSPADM

## 2021-06-06 RX ADMIN — ENOXAPARIN SODIUM 30 MG: 100 INJECTION SUBCUTANEOUS at 15:31

## 2021-06-06 RX ADMIN — ATORVASTATIN CALCIUM 80 MG: 80 TABLET, FILM COATED ORAL at 20:10

## 2021-06-06 RX ADMIN — BUPROPION HYDROCHLORIDE 75 MG: 75 TABLET, FILM COATED ORAL at 08:18

## 2021-06-06 RX ADMIN — INSULIN LISPRO 3 UNITS: 100 INJECTION, SOLUTION INTRAVENOUS; SUBCUTANEOUS at 11:50

## 2021-06-06 RX ADMIN — INSULIN GLARGINE 10 UNITS: 100 INJECTION, SOLUTION SUBCUTANEOUS at 22:24

## 2021-06-06 RX ADMIN — BUPROPION HYDROCHLORIDE 75 MG: 75 TABLET, FILM COATED ORAL at 20:10

## 2021-06-06 RX ADMIN — SODIUM CHLORIDE, PRESERVATIVE FREE 10 ML: 5 INJECTION INTRAVENOUS at 08:18

## 2021-06-06 RX ADMIN — DILTIAZEM HYDROCHLORIDE 300 MG: 180 CAPSULE, COATED, EXTENDED RELEASE ORAL at 08:17

## 2021-06-06 RX ADMIN — Medication 1000 UNITS: at 08:18

## 2021-06-06 RX ADMIN — TORSEMIDE 40 MG: 20 TABLET ORAL at 08:18

## 2021-06-06 RX ADMIN — ISOSORBIDE MONONITRATE 120 MG: 60 TABLET ORAL at 08:17

## 2021-06-06 RX ADMIN — METOPROLOL SUCCINATE 150 MG: 100 TABLET, EXTENDED RELEASE ORAL at 20:10

## 2021-06-06 RX ADMIN — SODIUM CHLORIDE, PRESERVATIVE FREE 10 ML: 5 INJECTION INTRAVENOUS at 20:11

## 2021-06-06 RX ADMIN — SPIRONOLACTONE 25 MG: 25 TABLET ORAL at 08:17

## 2021-06-06 RX ADMIN — CLOPIDOGREL 75 MG: 75 TABLET, FILM COATED ORAL at 08:18

## 2021-06-06 RX ADMIN — ASPIRIN 325 MG: 325 TABLET ORAL at 08:18

## 2021-06-06 RX ADMIN — HYDRALAZINE HYDROCHLORIDE 100 MG: 50 TABLET, FILM COATED ORAL at 09:27

## 2021-06-06 RX ADMIN — HYDRALAZINE HYDROCHLORIDE 100 MG: 50 TABLET, FILM COATED ORAL at 22:24

## 2021-06-06 RX ADMIN — HYDRALAZINE HYDROCHLORIDE 100 MG: 50 TABLET, FILM COATED ORAL at 15:31

## 2021-06-06 NOTE — PROGRESS NOTES
[facility]    PROGRESS NOTE    Name:  Denny Ivy   Age:  89 y.o.  Sex:  female  :  8/3/1929  MRN:  [id number]   Visit Number:  [id number]  Admission Date:  2021  Date Of Service:  21  Primary Care Physician:  Мария Wilks MD     LOS: 6 days :  Patient Care Team:  Мария Wilks MD as PCP - General (Internal Medicine)  Мария Wilks MD as PCP - Internal Medicine:    History taken from:     patient chart    Chief Complaint:      Weakness    Subjective     Interval History:     Patient seen and examined 2021.    89-year-old female with type 2 diabetes, hypertension, hypothyroidism, diastolic CHF, chronic kidney disease was discharged from the hospital 2021 after being admitted for congestive heart failure.  She went from 190 pounds down to 180.  She continued not to eat after being discharged and was down to 168 pounds.  Creatinine was up to 1.76.  Diuretics were held, IV fluids were given and the creatinine came down.  Diuretics have been restarted, creatinine appears to be stable around 1.2.      Unfortunately her blood pressure had gone up as well.  Cardiology was consulted as they were following at the last visit.  Patient is currently on Cardizem CD which has been increased, Demadex, Apresoline, Imdur, Toprol-XL, and now Aldactone.  Renal artery duplex has been ordered.  This was negative for renal artery stenosis..  Blood pressure appears to be improving, however noted renal worsening again.  Creatinine is up to 1.46.  Cardiology has recommended reevaluation in the morning and holding Demadex and Aldactone until labs have come back and they reviewed.  They have discussed this with the son who was present at bedside.    Patient appeared to have some confusion on 6/3/2021, so rapid response was called.  CT of the brain was done and neurology was consulted.  CT of the brain did not show any acute intracranial pathology.  MRI and CTA of the head neck  were ordered.  These did not reveal any significant blockage or CVA.  Thyroid cyst were noted.  Neurology recommends dual antiplatelet therapy for 21 days and then aspirin daily.  Lipitor is continued.  Patient does have a 3 mm osteoma or calcified meningioma which is unchanged.  They have signed off.    Speech therapy saw the patient and recommended regular diet with thin liquids and signed off.  PT and OT are following.    Patient appears stable today.  She is quite communicative.  She denies any chest pressure, shortness of breath, nausea, vomiting, or pain.  She appears to be improving.  Hopefully home once her blood pressure and creatinine are stable.        Review of Systems:     All systems were reviewed and negative except for:  Musculoskeletal: positive for  muscle weakness    Objective     Vital Signs:    Temp:  [97.7 °F (36.5 °C)-98.1 °F (36.7 °C)] 98 °F (36.7 °C)  Heart Rate:  [65-73] 72  Resp:  [18] 18  BP: (148-179)/(55-73) 154/55    Physical Exam:    General: Alert and oriented x3, mild distress.  Heart: Regular rate and rhythm without murmur rub or thrill.  Lungs: Decreased breath sounds bilaterally without use of accessory muscles respiration.  Abdomen: Soft/nontender/nondistended.  No HSM noted.  MSK: 4/5 strength in upper/lower extremities bilaterally.     Results Review:      I reviewed the patient's new clinical results.  I reviewed the patient's new imaging results and agree with the interpretation.  I reviewed the patient's other test results and agree with the interpretation    Labs:    Lab Results (last 24 hours)     Procedure Component Value Units Date/Time    POC Glucose Once [[id number]]  (Abnormal) Collected: 06/06/21 1045    Specimen: Blood Updated: 06/06/21 1046     Glucose 182 mg/dL     POC Glucose Once [[id number]]  (Normal) Collected: 06/06/21 0632    Specimen: Blood Updated: 06/06/21 0633     Glucose 102 mg/dL     Renal Function Panel [[id number]]  (Abnormal) Collected: 06/06/21 0422     Specimen: Blood Updated: 06/06/21 0536     Glucose 98 mg/dL      BUN 37 mg/dL      Creatinine 1.46 mg/dL      Sodium 140 mmol/L      Potassium 4.2 mmol/L      Chloride 102 mmol/L      CO2 29.0 mmol/L      Calcium 9.1 mg/dL      Albumin 3.00 g/dL      Phosphorus 3.7 mg/dL      Anion Gap 9.0 mmol/L      BUN/Creatinine Ratio 25.3     eGFR   Amer 41 mL/min/1.73     Narrative:      GFR Normal >60  Chronic Kidney Disease <60  Kidney Failure <15      Magnesium [[id number]]  (Normal) Collected: 06/06/21 0432    Specimen: Blood Updated: 06/06/21 0536     Magnesium 1.9 mg/dL     CBC (No Diff) [[id number]]  (Abnormal) Collected: 06/06/21 0432    Specimen: Blood Updated: 06/06/21 0509     WBC 4.68 10*3/mm3      RBC 3.43 10*6/mm3      Hemoglobin 10.1 g/dL      Hematocrit 31.6 %      MCV 92.1 fL      MCH 29.4 pg      MCHC 32.0 g/dL      RDW 13.1 %      RDW-SD 43.3 fl      MPV 10.1 fL      Platelets 186 10*3/mm3     POC Glucose Once [[id number]]  (Abnormal) Collected: 06/05/21 2306    Specimen: Blood Updated: 06/05/21 2308     Glucose 139 mg/dL     POC Glucose Once [[id number]]  (Normal) Collected: 06/05/21 2049    Specimen: Blood Updated: 06/05/21 2050     Glucose 90 mg/dL     POC Glucose Once [[id number]]  (Abnormal) Collected: 06/05/21 1606    Specimen: Blood Updated: 06/05/21 1607     Glucose 300 mg/dL            Radiology:    Imaging Results (Last 24 Hours)     ** No results found for the last 24 hours. **          Medication Review:     aspirin, 325 mg, Oral, Daily   Or  aspirin, 300 mg, Rectal, Daily  atorvastatin, 80 mg, Oral, Nightly  buPROPion, 75 mg, Oral, Q12H  cholecalciferol, 1,000 Units, Oral, Daily  clopidogrel, 75 mg, Oral, Daily  dilTIAZem CD, 300 mg, Oral, Q24H  enoxaparin, 30 mg, Subcutaneous, Q24H  hydrALAZINE, 100 mg, Oral, Q8H  insulin glargine, 10 Units, Subcutaneous, Nightly  insulin lispro, 0-14 Units, Subcutaneous, TID AC  isosorbide mononitrate, 120 mg, Oral, Daily  metoprolol succinate XL, 150 mg,  Oral, Nightly  sodium chloride, 10 mL, Intravenous, Q12H  sodium chloride, 10 mL, Intravenous, Q12H  spironolactone, 25 mg, Oral, Daily  [START ON 6/7/2021] torsemide, 20 mg, Oral, Daily             Assessment/Plan     Principal Problem:    Generalized weakness  Active Problems:    PRABHU treated with autoBiPAP    HTN (hypertension)    Type 2 diabetes mellitus, with long-term current use of insulin (CMS/[facility])    CKD (chronic kidney disease) stage 3, GFR 30-59 ml/min (CMS/[facility])    COLLIN (acute kidney injury) (CMS/[facility])    Hypotension    Weight loss    Anemia    Hyperkalemia    Chronic diastolic CHF (congestive heart failure) (CMS/[facility])      1.  Hypotension, likely due to overdiuresis.  This is resolved.  2.  Hypertension, uncontrolled, continue to monitor.  3.  Diabetes mellitus type 2, blood sugar stable.  4.  Chronic kidney disease stage III  5.  PRABHU  6.  Metabolic encephalopathy, likely due to low blood pressure, resolved.  7.  3 mm left frontal osteoma or meningioma, stable.  8.  Brief metabolic encephalopathy, stroke ruled out.  Neurology has recommended dual antiplatelet therapy aspirin/Plavix for 21 days.  9.  Depression, suspect reactive  10.  Multiple thyroid cysts.  Ultrasound as an outpatient.  11.  Left leg pain, Doppler negative.  12.  Hyperlipidemia, on Lipitor.  13.  Renal insufficiency, likely due to diuresis.    Plan:      Continue blood pressure medications per cardiology.  Hold diuretics until morning labs.  Monitor blood pressure, slowly improving.  Will consult nephrology given problems with her renal function.   Monitor lab work.  Monitor blood sugars, these are stable.   Lovenox continued for DVT prophylaxis.    PT working with patient.  Anticipate home once blood pressure is controlled.  Further recommendations will depend on the clinical course.    Geoffrey Potts DO  06/06/21  11:46 EDT

## 2021-06-06 NOTE — PROGRESS NOTES
"    Patient Name: Denny Ivy  :8/3/1929  89 y.o.      Patient Care Team:  Мария Wilks MD as PCP - General (Internal Medicine)  Мария Wilks MD as PCP - Internal Medicine    Chief Complaint: follow up hypertension, chronic diastolic heart failure    Interval History:  Blood pressure starting to improve. Renal function slightly worse again today. Already received torsemide and spironolactone.    Objective   Vital Signs  Temp:  [97.6 °F (36.4 °C)-98.1 °F (36.7 °C)] 98 °F (36.7 °C)  Heart Rate:  [65-73] 73  Resp:  [18] 18  BP: (148-184)/(55-80) 167/73    Intake/Output Summary (Last 24 hours) at 2021 1026  Last data filed at 2021 0622  Gross per 24 hour   Intake 360 ml   Output 900 ml   Net -540 ml     Flowsheet Rows      First Filed Value   Admission Height  154.9 cm (61\") Documented at 2021   Admission Weight  83.8 kg (184 lb 11.2 oz) Documented at 2021          Physical Exam:   General Appearance:   awake. Up in chair.    Lungs:     Clear to auscultation.  Normal respiratory effort and rate.      Heart:    Regular rhythm and normal rate, normal S1 and S2, no murmurs, gallops or rubs.     Chest Wall:    No abnormalities observed   Abdomen:     Soft, nontender, positive bowel sounds.     Extremities:   no cyanosis, clubbing or edema.  No marked joint deformities.  Adequate musculoskeletal strength.       Results Review:    Results from last 7 days   Lab Units 21  0432   SODIUM mmol/L 140   POTASSIUM mmol/L 4.2   CHLORIDE mmol/L 102   CO2 mmol/L 29.0   BUN mg/dL 37*   CREATININE mg/dL 1.46*   GLUCOSE mg/dL 98   CALCIUM mg/dL 9.1         Results from last 7 days   Lab Units 21  0432   WBC 10*3/mm3 4.68   HEMOGLOBIN g/dL 10.1*   HEMATOCRIT % 31.6*   PLATELETS 10*3/mm3 186         Results from last 7 days   Lab Units 21  0432   MAGNESIUM mg/dL 1.9     Results from last 7 days   Lab Units 21  0549   CHOLESTEROL mg/dL 133   TRIGLYCERIDES mg/dL 69   HDL " CHOL mg/dL 55   LDL CHOL mg/dL 64               Medication Review:   aspirin, 325 mg, Oral, Daily   Or  aspirin, 300 mg, Rectal, Daily  atorvastatin, 80 mg, Oral, Nightly  buPROPion, 75 mg, Oral, Q12H  cholecalciferol, 1,000 Units, Oral, Daily  clopidogrel, 75 mg, Oral, Daily  dilTIAZem CD, 300 mg, Oral, Q24H  enoxaparin, 30 mg, Subcutaneous, Q24H  hydrALAZINE, 100 mg, Oral, Q8H  insulin glargine, 10 Units, Subcutaneous, Nightly  insulin lispro, 0-14 Units, Subcutaneous, TID AC  isosorbide mononitrate, 120 mg, Oral, Daily  metoprolol succinate XL, 150 mg, Oral, Nightly  sodium chloride, 10 mL, Intravenous, Q12H  sodium chloride, 10 mL, Intravenous, Q12H  spironolactone, 25 mg, Oral, Daily  [START ON 6/7/2021] torsemide, 20 mg, Oral, Daily              Assessment/Plan       1.  Dyspnea most likely secondary to acute diastolic heart failure. She is back on oral diuretics. Denies SOA now.  2.  Hypertensive urgency   3.  acute on Chronic kidney disease  4.  Diabetes  5.  Obstructive sleep apnea.  6.  Acute mental status change    - she was initially hypotensive with COLLIN. This has resolved and she is back on her home regimen and BP remains uncontrolled. Spironolactone added 6/4. BP starting to look better. Continue same today. Renal function slightly worse, already received torsemide and spironolactone this AM. Reduce torsemide to 20mg daily. Repeat labs in AM. Hold meds until labs reviewed by provider.   - renal artery duplex without renal artery stenosis  - discussed with son at bedside.    DENNY Lopez  [facility]  06/06/21  10:26 EDT

## 2021-06-06 NOTE — PLAN OF CARE
Goal Outcome Evaluation:  Plan of Care Reviewed With: patient  Progress: improving  Outcome Summary: Pt A&O x3, forgetful at times, daughter remained at bedside, BP stable after nightly medications (elevated prior to meds), room air, incontinence care, Q2H turns. Tylenol for generalized pain. Will continue to monitor.

## 2021-06-06 NOTE — PLAN OF CARE
Goal Outcome Evaluation:  Plan of Care Reviewed With: patient  Progress: improving  Outcome Summary: Patient continues to exhibit significantly FF posture with forearms resting on wx handgrips with ambulation which her visitor confirms is the way she used her wx prior to admission.  She required min A of 1-2 for transfers/ambulation and was noted to fatigue quickly with activity.  Patient was intermittently wearing a face mask during this therapy encounter. Therapist used appropriate personal protective equipment including eye protection, mask, and gloves.  Mask used was standard procedure mask. Appropriate PPE was worn during the entire therapy session. Hand hygiene was completed before and after therapy session. Patient is not in enhanced droplet precautions.

## 2021-06-06 NOTE — CONSULTS
Referring Provider: Dr Potts   Reason for Consultation: COLLIN CKD3     Subjective     Chief complaint   Chief Complaint   Patient presents with   • Altered Mental Status       History of present illness:  89 yo AAF with h/o CKD stage 3, DM2, HTN, dCHF admitted 5/30/21 with fatigue, poor oral intake, low BP.  Latter attributed to excessive diuresis.  She had just been discharged 2 days prior (hosp 5/22 to 5/28) for acute on chronic CHF, diuresed about 10 lbs water weight.  BL Cr appears to be roughly 1.3 - 1.5, was 1.4 at discharge on 5/28, 1.7 on 5/30, improved to 1 mg/dL from 6/1 to 6/3, now drifting back up some 1.2 - 1.4 mg/dL today.  She was sent home on torsemide 40mg daily last week (in place of lasix she took before) and ARB was held.  This was reduced to 20mg daily today per cardiology due to climb in Cr, and on aldactone 25mg daily (added 6/4).  BP labile, 140s - 180s systolic last 24h, on max hydralazine as well as toprol XL 150mg and cardizem  mg daily.  HR in 60s to 70s.  Renal doppler yesterday showed no e/o SRAVANI, bilat simple cysts present.  Weight quite variable between standing and bed scales.  She did receive IV contrast on 6/4 in form of CTA head & neck for AMS.  Family member at bedside says swelling much better but laments poor PO intake and lunch tray untouched.  No dyspnea currently.       Past Medical History:   Diagnosis Date   • Arthritis    • Diabetes 1.5, managed as type 2 (CMS/HCC)    • Edema    • Hypertension    • Hypothyroidism    • Low back pain    • Macular degeneration      Past Surgical History:   Procedure Laterality Date   • CARDIAC SURGERY     • CHOLECYSTECTOMY     • HIATAL HERNIA REPAIR     • HYSTERECTOMY       History reviewed. No pertinent family history.    Social History     Tobacco Use   • Smoking status: Never Smoker   • Smokeless tobacco: Never Used   Substance Use Topics   • Alcohol use: No   • Drug use: No     Medications Prior to Admission   Medication Sig  Dispense Refill Last Dose   • aspirin 81 MG EC tablet Take 81 mg by mouth daily.      • buPROPion (WELLBUTRIN) 75 MG tablet Take 75 mg by mouth 2 (two) times a day.      • diltiazem (TIAZAC) 120 MG 24 hr capsule Take 120 mg by mouth daily.      • glucose blood (Accu-Chek Harriet Plus) test strip CBS three times daily      • hydrALAZINE (APRESOLINE) 100 MG tablet Take 1 tablet by mouth 3 (Three) Times a Day. 90 tablet 0    • HYDROcodone-acetaminophen (NORCO) 5-325 MG per tablet Take 1 tablet by mouth 4 (four) times a day as needed for severe pain (7-10). 12 tablet 0    • insulin glargine (LANTUS) 100 UNIT/ML injection Inject 22 Units under the skin into the appropriate area as directed Every Night.      • insulin lispro (HumaLOG) 100 UNIT/ML injection Inject  under the skin into the appropriate area as directed 3 (Three) Times a Day Before Meals. Sliding scale      • Insulin Pen Needle (B-D UF III MINI PEN NEEDLES) 31G X 5 MM misc 400 each by Other route.      • isosorbide mononitrate (IMDUR) 120 MG 24 hr tablet Take 1 tablet by mouth Daily. 30 tablet 0    • meclizine (ANTIVERT) 25 MG tablet Take 1 tablet by mouth 3 (Three) Times a Day As Needed for dizziness. 21 tablet 0    • metoprolol succinate XL (TOPROL-XL) 50 MG 24 hr tablet Take 3 tablets by mouth Every Night. 90 tablet 0    • ondansetron ODT (ZOFRAN-ODT) 4 MG disintegrating tablet Take 1 tablet by mouth Every 6 (Six) Hours As Needed for Nausea. 10 tablet 0    • pravastatin (PRAVACHOL) 40 MG tablet Take 40 mg by mouth daily.      • torsemide (DEMADEX) 20 MG tablet Take 2 tablets by mouth Daily. 60 tablet 0      Allergies:  Vasotec [enalapril], Atacand hct [candesartan cilexetil-hctz], Calan [verapamil], Glucophage [metformin hcl], Hytrin [terazosin], Metolazone, Norvasc [amlodipine besylate], and Thiazide-type diuretics    Review of Systems  14 points review of system was performed and it was negative other than what noted above in the HPI    Objective     Vital  "Signs  Temp:  [97.7 °F (36.5 °C)-98.1 °F (36.7 °C)] 98 °F (36.7 °C)  Heart Rate:  [65-73] 72  Resp:  [18] 18  BP: (148-179)/(55-73) 154/55    Flowsheet Rows      First Filed Value   Admission Height  154.9 cm (61\") Documented at 05/30/2021 2110   Admission Weight  83.8 kg (184 lb 11.2 oz) Documented at 05/30/2021 2110           No intake/output data recorded.  I/O last 3 completed shifts:  In: 360 [P.O.:360]  Out: 2900 [Urine:2900]    Intake/Output Summary (Last 24 hours) at 6/6/2021 1326  Last data filed at 6/6/2021 0622  Gross per 24 hour   Intake 360 ml   Output 900 ml   Net -540 ml       Physical Exam:  General Appearance: frail elderly AAF in chair, no distress, mentation slow  HEENT NC/AT OP clear  Neck supple no JVD  Lungs CTA bilat no rales  CV RRR no m/g  abd soft NT/ND   vasc trace BLE pedal/ankle edema, 2+ radial pulses  MS No joint warmth or erythema  Derm normal turgor, no rash  Neuro speech slow, follows commands     Results Review:  Results for orders placed or performed during the hospital encounter of 05/30/21   COVID-19,[facility], NP/OP SWAB IN UTM/VTM/[facility] TRANSPORT MEDIA,24 HR TAT - Swab, Nasopharynx    Specimen: Nasopharynx; Swab   Result Value Ref Range    COVID19 Not Detected Not Detected - Ref. Range   Comprehensive Metabolic Panel    Specimen: Blood   Result Value Ref Range    Glucose 116 (H) 65 - 99 mg/dL    BUN 62 (H) 8 - 23 mg/dL    Creatinine 1.76 (H) 0.57 - 1.00 mg/dL    Sodium 139 136 - 145 mmol/L    Potassium 5.6 (H) 3.5 - 5.2 mmol/L    Chloride 101 98 - 107 mmol/L    CO2 27.9 22.0 - 29.0 mmol/L    Calcium 9.6 8.2 - 9.6 mg/dL    Total Protein 6.9 6.0 - 8.5 g/dL    Albumin 3.70 3.50 - 5.20 g/dL    ALT (SGPT) 28 1 - 33 U/L    AST (SGOT) 26 1 - 32 U/L    Alkaline Phosphatase 55 39 - 117 U/L    Total Bilirubin 0.3 0.0 - 1.2 mg/dL    eGFR  African Amer 33 (L) >60 mL/min/1.73    Globulin 3.2 gm/dL    A/G Ratio 1.2 g/dL    BUN/Creatinine Ratio 35.2 (H) 7.0 - 25.0    Anion " Gap 10.1 5.0 - 15.0 mmol/L   Urinalysis With Microscopic If Indicated (No Culture) - Urine, Clean Catch    Specimen: Urine, Clean Catch   Result Value Ref Range    Color, UA Yellow Yellow, Straw    Appearance, UA Clear Clear    pH, UA 6.0 5.0 - 8.0    Specific Gravity, UA 1.014 1.005 - 1.030    Glucose, UA Negative Negative    Ketones, UA Negative Negative    Bilirubin, UA Negative Negative    Blood, UA Negative Negative    Protein, UA Trace (A) Negative    Leuk Esterase, UA Negative Negative    Nitrite, UA Negative Negative    Urobilinogen, UA 1.0 E.U./dL 0.2 - 1.0 E.U./dL   CBC Auto Differential    Specimen: Blood   Result Value Ref Range    WBC 6.95 3.40 - 10.80 10*3/mm3    RBC 3.56 (L) 3.77 - 5.28 10*6/mm3    Hemoglobin 10.7 (L) 12.0 - 15.9 g/dL    Hematocrit 33.3 (L) 34.0 - 46.6 %    MCV 93.5 79.0 - 97.0 fL    MCH 30.1 26.6 - 33.0 pg    MCHC 32.1 31.5 - 35.7 g/dL    RDW 13.0 12.3 - 15.4 %    RDW-SD 43.9 37.0 - 54.0 fl    MPV 10.6 6.0 - 12.0 fL    Platelets 203 140 - 450 10*3/mm3    Neutrophil % 76.5 (H) 42.7 - 76.0 %    Lymphocyte % 11.8 (L) 19.6 - 45.3 %    Monocyte % 9.4 5.0 - 12.0 %    Eosinophil % 1.7 0.3 - 6.2 %    Basophil % 0.3 0.0 - 1.5 %    Immature Grans % 0.3 0.0 - 0.5 %    Neutrophils, Absolute 5.32 1.70 - 7.00 10*3/mm3    Lymphocytes, Absolute 0.82 0.70 - 3.10 10*3/mm3    Monocytes, Absolute 0.65 0.10 - 0.90 10*3/mm3    Eosinophils, Absolute 0.12 0.00 - 0.40 10*3/mm3    Basophils, Absolute 0.02 0.00 - 0.20 10*3/mm3    Immature Grans, Absolute 0.02 0.00 - 0.05 10*3/mm3    nRBC 0.0 0.0 - 0.2 /100 WBC   Hemoglobin A1c    Specimen: Blood   Result Value Ref Range    Hemoglobin A1C 6.36 (H) 4.80 - 5.60 %   Basic Metabolic Panel    Specimen: Blood   Result Value Ref Range    Glucose 105 (H) 65 - 99 mg/dL    BUN 55 (H) 8 - 23 mg/dL    Creatinine 1.49 (H) 0.57 - 1.00 mg/dL    Sodium 142 136 - 145 mmol/L    Potassium 4.0 3.5 - 5.2 mmol/L    Chloride 105 98 - 107 mmol/L    CO2 27.8 22.0 - 29.0 mmol/L     Calcium 8.9 8.2 - 9.6 mg/dL    eGFR  African Amer 40 (L) >60 mL/min/1.73    BUN/Creatinine Ratio 36.9 (H) 7.0 - 25.0    Anion Gap 9.2 5.0 - 15.0 mmol/L   CBC (No Diff)    Specimen: Blood   Result Value Ref Range    WBC 5.72 3.40 - 10.80 10*3/mm3    RBC 3.36 (L) 3.77 - 5.28 10*6/mm3    Hemoglobin 10.3 (L) 12.0 - 15.9 g/dL    Hematocrit 32.2 (L) 34.0 - 46.6 %    MCV 95.8 79.0 - 97.0 fL    MCH 30.7 26.6 - 33.0 pg    MCHC 32.0 31.5 - 35.7 g/dL    RDW 13.1 12.3 - 15.4 %    RDW-SD 45.5 37.0 - 54.0 fl    MPV 10.2 6.0 - 12.0 fL    Platelets 171 140 - 450 10*3/mm3   Comprehensive Metabolic Panel    Specimen: Blood   Result Value Ref Range    Glucose 119 (H) 65 - 99 mg/dL    BUN 36 (H) 8 - 23 mg/dL    Creatinine 1.04 (H) 0.57 - 1.00 mg/dL    Sodium 140 136 - 145 mmol/L    Potassium 4.0 3.5 - 5.2 mmol/L    Chloride 108 (H) 98 - 107 mmol/L    CO2 28.5 22.0 - 29.0 mmol/L    Calcium 8.8 8.2 - 9.6 mg/dL    Total Protein 5.8 (L) 6.0 - 8.5 g/dL    Albumin 3.10 (L) 3.50 - 5.20 g/dL    ALT (SGPT) 20 1 - 33 U/L    AST (SGOT) 12 1 - 32 U/L    Alkaline Phosphatase 46 39 - 117 U/L    Total Bilirubin 0.3 0.0 - 1.2 mg/dL    eGFR  African Amer 60 (L) >60 mL/min/1.73    Globulin 2.7 gm/dL    A/G Ratio 1.1 g/dL    BUN/Creatinine Ratio 34.6 (H) 7.0 - 25.0    Anion Gap 3.5 (L) 5.0 - 15.0 mmol/L   Magnesium    Specimen: Blood   Result Value Ref Range    Magnesium 2.0 1.7 - 2.3 mg/dL   Ferritin    Specimen: Blood   Result Value Ref Range    Ferritin 64.20 13.00 - 150.00 ng/mL   Folate    Specimen: Blood   Result Value Ref Range    Folate >20.00 4.78 - 24.20 ng/mL   Iron Profile    Specimen: Blood   Result Value Ref Range    Iron 79 37 - 145 mcg/dL    Iron Saturation 27 20 - 50 %    Transferrin 194 (L) 200 - 360 mg/dL    TIBC 289 (L) 298 - 536 mcg/dL   Vitamin B12    Specimen: Blood   Result Value Ref Range    Vitamin B-12 1,031 (H) 211 - 946 pg/mL   TSH    Specimen: Blood   Result Value Ref Range    TSH 2.200 0.270 - 4.200 uIU/mL   Vitamin D 25  Hydroxy    Specimen: Blood   Result Value Ref Range    25 Hydroxy, Vitamin D 29.0 (L) 30.0 - 100.0 ng/ml   CBC Auto Differential    Specimen: Blood   Result Value Ref Range    WBC 4.22 3.40 - 10.80 10*3/mm3    RBC 3.14 (L) 3.77 - 5.28 10*6/mm3    Hemoglobin 9.6 (L) 12.0 - 15.9 g/dL    Hematocrit 29.4 (L) 34.0 - 46.6 %    MCV 93.6 79.0 - 97.0 fL    MCH 30.6 26.6 - 33.0 pg    MCHC 32.7 31.5 - 35.7 g/dL    RDW 12.8 12.3 - 15.4 %    RDW-SD 43.7 37.0 - 54.0 fl    MPV 10.4 6.0 - 12.0 fL    Platelets 162 140 - 450 10*3/mm3    Neutrophil % 62.9 42.7 - 76.0 %    Lymphocyte % 19.0 (L) 19.6 - 45.3 %    Monocyte % 13.5 (H) 5.0 - 12.0 %    Eosinophil % 3.6 0.3 - 6.2 %    Basophil % 0.5 0.0 - 1.5 %    Immature Grans % 0.5 0.0 - 0.5 %    Neutrophils, Absolute 2.66 1.70 - 7.00 10*3/mm3    Lymphocytes, Absolute 0.80 0.70 - 3.10 10*3/mm3    Monocytes, Absolute 0.57 0.10 - 0.90 10*3/mm3    Eosinophils, Absolute 0.15 0.00 - 0.40 10*3/mm3    Basophils, Absolute 0.02 0.00 - 0.20 10*3/mm3    Immature Grans, Absolute 0.02 0.00 - 0.05 10*3/mm3    nRBC 0.0 0.0 - 0.2 /100 WBC   Renal Function Panel    Specimen: Blood   Result Value Ref Range    Glucose 151 (H) 65 - 99 mg/dL    BUN 31 (H) 8 - 23 mg/dL    Creatinine 0.98 0.57 - 1.00 mg/dL    Sodium 142 136 - 145 mmol/L    Potassium 4.4 3.5 - 5.2 mmol/L    Chloride 108 (H) 98 - 107 mmol/L    CO2 26.3 22.0 - 29.0 mmol/L    Calcium 8.9 8.2 - 9.6 mg/dL    Albumin 3.00 (L) 3.50 - 5.20 g/dL    Phosphorus 2.8 2.5 - 4.5 mg/dL    Anion Gap 7.7 5.0 - 15.0 mmol/L    BUN/Creatinine Ratio 31.6 (H) 7.0 - 25.0    eGFR  African Amer 64 >60 mL/min/1.73   Magnesium    Specimen: Blood   Result Value Ref Range    Magnesium 2.1 1.7 - 2.3 mg/dL   CBC (No Diff)    Specimen: Blood   Result Value Ref Range    WBC 4.44 3.40 - 10.80 10*3/mm3    RBC 3.29 (L) 3.77 - 5.28 10*6/mm3    Hemoglobin 10.1 (L) 12.0 - 15.9 g/dL    Hematocrit 31.4 (L) 34.0 - 46.6 %    MCV 95.4 79.0 - 97.0 fL    MCH 30.7 26.6 - 33.0 pg    MCHC 32.2  31.5 - 35.7 g/dL    RDW 13.0 12.3 - 15.4 %    RDW-SD 45.9 37.0 - 54.0 fl    MPV 10.6 6.0 - 12.0 fL    Platelets 160 140 - 450 10*3/mm3   Renal Function Panel    Specimen: Blood   Result Value Ref Range    Glucose 121 (H) 65 - 99 mg/dL    BUN 29 (H) 8 - 23 mg/dL    Creatinine 0.99 0.57 - 1.00 mg/dL    Sodium 144 136 - 145 mmol/L    Potassium 4.2 3.5 - 5.2 mmol/L    Chloride 105 98 - 107 mmol/L    CO2 29.5 (H) 22.0 - 29.0 mmol/L    Calcium 9.6 8.2 - 9.6 mg/dL    Albumin 3.70 3.50 - 5.20 g/dL    Phosphorus 2.8 2.5 - 4.5 mg/dL    Anion Gap 9.5 5.0 - 15.0 mmol/L    BUN/Creatinine Ratio 29.3 (H) 7.0 - 25.0    eGFR  African Amer 64 >60 mL/min/1.73   Magnesium    Specimen: Blood   Result Value Ref Range    Magnesium 1.9 1.7 - 2.3 mg/dL   Renal Function Panel    Specimen: Blood   Result Value Ref Range    Glucose 112 (H) 65 - 99 mg/dL    BUN 32 (H) 8 - 23 mg/dL    Creatinine 1.27 (H) 0.57 - 1.00 mg/dL    Sodium 142 136 - 145 mmol/L    Potassium 4.4 3.5 - 5.2 mmol/L    Chloride 102 98 - 107 mmol/L    CO2 27.1 22.0 - 29.0 mmol/L    Calcium 9.4 8.2 - 9.6 mg/dL    Albumin 3.20 (L) 3.50 - 5.20 g/dL    Phosphorus 3.4 2.5 - 4.5 mg/dL    Anion Gap 12.9 5.0 - 15.0 mmol/L    BUN/Creatinine Ratio 25.2 (H) 7.0 - 25.0    eGFR  African Amer 48 (L) >60 mL/min/1.73   Magnesium    Specimen: Blood   Result Value Ref Range    Magnesium 1.8 1.7 - 2.3 mg/dL   CBC (No Diff)    Specimen: Blood   Result Value Ref Range    WBC 4.81 3.40 - 10.80 10*3/mm3    RBC 3.47 (L) 3.77 - 5.28 10*6/mm3    Hemoglobin 10.3 (L) 12.0 - 15.9 g/dL    Hematocrit 31.8 (L) 34.0 - 46.6 %    MCV 91.6 79.0 - 97.0 fL    MCH 29.7 26.6 - 33.0 pg    MCHC 32.4 31.5 - 35.7 g/dL    RDW 12.8 12.3 - 15.4 %    RDW-SD 42.5 37.0 - 54.0 fl    MPV 10.4 6.0 - 12.0 fL    Platelets 182 140 - 450 10*3/mm3   Lipid Panel    Specimen: Blood   Result Value Ref Range    Total Cholesterol 133 0 - 200 mg/dL    Triglycerides 69 0 - 150 mg/dL    HDL Cholesterol 55 40 - 60 mg/dL    LDL Cholesterol  64  0 - 100 mg/dL    VLDL Cholesterol 14 5 - 40 mg/dL    LDL/HDL Ratio 1.17    Basic Metabolic Panel    Specimen: Blood   Result Value Ref Range    Glucose 88 65 - 99 mg/dL    BUN 32 (H) 8 - 23 mg/dL    Creatinine 1.20 (H) 0.57 - 1.00 mg/dL    Sodium 140 136 - 145 mmol/L    Potassium 4.2 3.5 - 5.2 mmol/L    Chloride 101 98 - 107 mmol/L    CO2 30.3 (H) 22.0 - 29.0 mmol/L    Calcium 9.4 8.2 - 9.6 mg/dL    eGFR  African Amer 51 (L) >60 mL/min/1.73    BUN/Creatinine Ratio 26.7 (H) 7.0 - 25.0    Anion Gap 8.7 5.0 - 15.0 mmol/L   Renal Function Panel    Specimen: Blood   Result Value Ref Range    Glucose 98 65 - 99 mg/dL    BUN 37 (H) 8 - 23 mg/dL    Creatinine 1.46 (H) 0.57 - 1.00 mg/dL    Sodium 140 136 - 145 mmol/L    Potassium 4.2 3.5 - 5.2 mmol/L    Chloride 102 98 - 107 mmol/L    CO2 29.0 22.0 - 29.0 mmol/L    Calcium 9.1 8.2 - 9.6 mg/dL    Albumin 3.00 (L) 3.50 - 5.20 g/dL    Phosphorus 3.7 2.5 - 4.5 mg/dL    Anion Gap 9.0 5.0 - 15.0 mmol/L    BUN/Creatinine Ratio 25.3 (H) 7.0 - 25.0    eGFR  African Amer 41 (L) >60 mL/min/1.73   Magnesium    Specimen: Blood   Result Value Ref Range    Magnesium 1.9 1.7 - 2.3 mg/dL   CBC (No Diff)    Specimen: Blood   Result Value Ref Range    WBC 4.68 3.40 - 10.80 10*3/mm3    RBC 3.43 (L) 3.77 - 5.28 10*6/mm3    Hemoglobin 10.1 (L) 12.0 - 15.9 g/dL    Hematocrit 31.6 (L) 34.0 - 46.6 %    MCV 92.1 79.0 - 97.0 fL    MCH 29.4 26.6 - 33.0 pg    MCHC 32.0 31.5 - 35.7 g/dL    RDW 13.1 12.3 - 15.4 %    RDW-SD 43.3 37.0 - 54.0 fl    MPV 10.1 6.0 - 12.0 fL    Platelets 186 140 - 450 10*3/mm3   POC Glucose Once    Specimen: Blood   Result Value Ref Range    Glucose 124 70 - 130 mg/dL   POC Glucose Once    Specimen: Blood   Result Value Ref Range    Glucose 113 70 - 130 mg/dL   POC Glucose Once    Specimen: Blood   Result Value Ref Range    Glucose 166 (H) 70 - 130 mg/dL   POC Glucose Once    Specimen: Blood   Result Value Ref Range    Glucose 219 (H) 70 - 130 mg/dL   POC Glucose Once     Specimen: Blood   Result Value Ref Range    Glucose 167 (H) 70 - 130 mg/dL   POC Glucose Once    Specimen: Blood   Result Value Ref Range    Glucose 117 70 - 130 mg/dL   POC Glucose Once    Specimen: Blood   Result Value Ref Range    Glucose 283 (H) 70 - 130 mg/dL   POC Glucose Once    Specimen: Blood   Result Value Ref Range    Glucose 144 (H) 70 - 130 mg/dL   POC Glucose Once    Specimen: Blood   Result Value Ref Range    Glucose 246 (H) 70 - 130 mg/dL   POC Glucose Once    Specimen: Blood   Result Value Ref Range    Glucose 169 (H) 70 - 130 mg/dL   POC Glucose Once    Specimen: Blood   Result Value Ref Range    Glucose 129 70 - 130 mg/dL   POC Glucose Once    Specimen: Blood   Result Value Ref Range    Glucose 209 (H) 70 - 130 mg/dL   POC Glucose Once    Specimen: Blood   Result Value Ref Range    Glucose 146 (H) 70 - 130 mg/dL   POC Glucose Once    Specimen: Blood   Result Value Ref Range    Glucose 117 70 - 130 mg/dL   POC Glucose Once    Specimen: Blood   Result Value Ref Range    Glucose 135 (H) 70 - 130 mg/dL   POC Glucose Once    Specimen: Blood   Result Value Ref Range    Glucose 200 (H) 70 - 130 mg/dL   POC Glucose Once    Specimen: Blood   Result Value Ref Range    Glucose 177 (H) 70 - 130 mg/dL   POC Glucose Once    Specimen: Blood   Result Value Ref Range    Glucose 125 70 - 130 mg/dL   POC Glucose Once    Specimen: Blood   Result Value Ref Range    Glucose 129 70 - 130 mg/dL   POC Glucose Once    Specimen: Blood   Result Value Ref Range    Glucose 205 (H) 70 - 130 mg/dL   POC Glucose Once    Specimen: Blood   Result Value Ref Range    Glucose 137 (H) 70 - 130 mg/dL   POC Glucose Once    Specimen: Blood   Result Value Ref Range    Glucose 110 70 - 130 mg/dL   POC Glucose Once    Specimen: Blood   Result Value Ref Range    Glucose 104 70 - 130 mg/dL   POC Glucose Once    Specimen: Blood   Result Value Ref Range    Glucose 139 (H) 70 - 130 mg/dL   POC Glucose Once    Specimen: Blood   Result Value Ref  Range    Glucose 300 (H) 70 - 130 mg/dL   POC Glucose Once    Specimen: Blood   Result Value Ref Range    Glucose 90 70 - 130 mg/dL   POC Glucose Once    Specimen: Blood   Result Value Ref Range    Glucose 139 (H) 70 - 130 mg/dL   POC Glucose Once    Specimen: Blood   Result Value Ref Range    Glucose 102 70 - 130 mg/dL   POC Glucose Once    Specimen: Blood   Result Value Ref Range    Glucose 182 (H) 70 - 130 mg/dL   Light Blue Top   Result Value Ref Range    Extra Tube hold for add-on    Green Top (Gel)   Result Value Ref Range    Extra Tube Hold for add-ons.    Lavender Top   Result Value Ref Range    Extra Tube hold for add-on    Gold Top - SST   Result Value Ref Range    Extra Tube Hold for add-ons.    Duplex Renal Artery - Bilateral Complete CAR   Result Value Ref Range    KID L RIGHT 10.8 cm    KID W RIGHT 5.4 cm    PROX JERILYN A PSV LEFT 84.5 cm/sec    PROX JERILYN A PSV RIGHT 88.0 cm/sec    PROX JERILYN A EDV LEFT 13.1 cm/sec    PROX JERILYN A EDV RIGHT 9.1 cm/sec    PROX JERILYN A RI LEFT 0.84     PROX JERILYN A RI RIGHT 0.9     MID JERILYN A PSV LEFT 72.6 cm/sec    MID JERILYN A PSV RIGHT 90.2 cm/sec    MID JERILYN A EDV LEFT 3.1 cm/sec    MID JERILYN A EDV RIGHT 2.8 cm/sec    MID JERILYN A RI LEFT 0.96     MID JERILYN A RI RIGHT 0.97     DIST JERILYN A PSV LEFT 80.1 cm/sec    DIST JERILYN A PSV RIGHT 77.8 cm/sec    DIST JERILYN A EDV LEFT 11.6 cm/sec    DIST JERILYN A EDV RIGHT 13.8 cm/sec    DIST JERILYN A RI LEFT 0.86     DIST JERILYN A RI RIGHT 0.82     Kid L 9.91 cm    Left kidney width 4.75 cm    Right renal upper parenchyma max 15.9 cm/s    Right renal upper parenchyma min 5.07 cm/s    Left renal upper parenchyma max 18.5 cm/s    Hilum Right PSV 22.1 cm/s    Hilum Right EDV 4.41 cm/s    Hilum Left PSV 27.9 cm/s    Hilum Left EDV 5.16 cm/s    Right renal upper parenchyma RI 0.68     BH CV VAS BP RIGHT /65 mmHg    BH CV VAS BP LEFT /70 mmHg    RENAL A ORG PSV LEFT 88.8 cm/s    RENAL A ORG EDV LEFT 8.13 cm/s    RENAL A ORG PSV RIGHT 77.8 cm/s    RENAL A ORG  EDV RIGHT 2.59 cm/s    RAR RIGHT 0.9     Aortic Mid PSV 99.3 cm/s    RAR LEFT 0.9      Imaging Results (Last 72 Hours)     Procedure Component Value Units Date/Time    CT Angiogram Head [[id number]] Collected: 06/04/21 1249     Updated: 06/04/21 1701    Narrative:      CT ANGIOGRAM NECK AND HEAD WITH CONTRAST     HISTORY: TIA, confusion, altered mental status.     Initially, noncontrasted CT examination of brain was performed. There is  no evidence of intracranial hemorrhage, hydrocephalus or of abnormal  extra-axial fluid. Moderate small vessel ischemic disease is noted. Area  of dural calcification is noted lateral to the left frontal lobe  measuring 3 x 4 mm in size consistent with a small meningioma.     A CT angiogram of the neck and head was then performed. Multiplanar as  well as 3-dimensional reconstructions were generated.     FINDINGS: The great vessels are arranged in a classic configuration.  Atherosclerotic disease is appreciated involving the origins of the  great vessels but without stenosis. There is 0% stenosis involving the  carotid bifurcations using NASCET criteria. The distal aspects of the  internal carotid arteries demonstrate moderate vascular calcification  with no evidence of a focal high-grade stenosis. The proximal aspects of  the anterior and middle cerebral arteries appear unremarkable.     Both vertebral arteries were opacified. The left vertebral artery is  slightly larger than that of the right. A small sal of vascular  calcification is noted at the origin of the left vertebral artery. A  mild to moderate stenosis of the left vertebral artery origin could not  be excluded. There is no evidence of a focal high-grade stenosis of the  cervical segments of the vertebral arteries. The basilar artery and the  proximal aspects of the posterior cerebral arteries appear unremarkable.     The thyroid glands are heterogeneous with multiple cysts present  bilaterally the largest of which are  complex and septated. The largest  cyst involves the right lobe of thyroid and measures approximately 2.6  cm in size. These are new versus the CT examination of the chest  performed on 04/04/2009. Sagittal reconstructions demonstrate severe  loss of disc height at C4-5 and moderate loss of disc height at C3-4.  There is grade 1 anterolisthesis of C2 upon C3 estimated to be  approximately 4 mm, 2 mm of anterolisthesis of C3 upon C4 and 3-4 mm of  anterolisthesis of C4 upon C5. Anterior bridging osteophyte is present  at C5-6.       Impression:      1.  No evidence of focal high-grade intracranial stenosis or aneurysm  proximally. There is 0% stenosis involving the carotid bifurcations  using NASCET criteria. See above.  2.  Multiple cysts are noted involving the thyroid bilaterally new  versus 04/04/2009 with the largest measuring approximately 2.6 cm in  diameter and demonstrating thin septations centrally.        Radiation dose reduction techniques were utilized, including automated  exposure control and exposure modulation based on body size.     This report was finalized on 6/4/2021 4:58 PM by Dr. Stanton Hoyos M.D.       CT Angiogram Neck [[id number]] Collected: 06/04/21 1249     Updated: 06/04/21 1701    Narrative:      CT ANGIOGRAM NECK AND HEAD WITH CONTRAST     HISTORY: TIA, confusion, altered mental status.     Initially, noncontrasted CT examination of brain was performed. There is  no evidence of intracranial hemorrhage, hydrocephalus or of abnormal  extra-axial fluid. Moderate small vessel ischemic disease is noted. Area  of dural calcification is noted lateral to the left frontal lobe  measuring 3 x 4 mm in size consistent with a small meningioma.     A CT angiogram of the neck and head was then performed. Multiplanar as  well as 3-dimensional reconstructions were generated.     FINDINGS: The great vessels are arranged in a classic configuration.  Atherosclerotic disease is appreciated involving the  origins of the  great vessels but without stenosis. There is 0% stenosis involving the  carotid bifurcations using NASCET criteria. The distal aspects of the  internal carotid arteries demonstrate moderate vascular calcification  with no evidence of a focal high-grade stenosis. The proximal aspects of  the anterior and middle cerebral arteries appear unremarkable.     Both vertebral arteries were opacified. The left vertebral artery is  slightly larger than that of the right. A small sal of vascular  calcification is noted at the origin of the left vertebral artery. A  mild to moderate stenosis of the left vertebral artery origin could not  be excluded. There is no evidence of a focal high-grade stenosis of the  cervical segments of the vertebral arteries. The basilar artery and the  proximal aspects of the posterior cerebral arteries appear unremarkable.     The thyroid glands are heterogeneous with multiple cysts present  bilaterally the largest of which are complex and septated. The largest  cyst involves the right lobe of thyroid and measures approximately 2.6  cm in size. These are new versus the CT examination of the chest  performed on 04/04/2009. Sagittal reconstructions demonstrate severe  loss of disc height at C4-5 and moderate loss of disc height at C3-4.  There is grade 1 anterolisthesis of C2 upon C3 estimated to be  approximately 4 mm, 2 mm of anterolisthesis of C3 upon C4 and 3-4 mm of  anterolisthesis of C4 upon C5. Anterior bridging osteophyte is present  at C5-6.       Impression:      1.  No evidence of focal high-grade intracranial stenosis or aneurysm  proximally. There is 0% stenosis involving the carotid bifurcations  using NASCET criteria. See above.  2.  Multiple cysts are noted involving the thyroid bilaterally new  versus 04/04/2009 with the largest measuring approximately 2.6 cm in  diameter and demonstrating thin septations centrally.        Radiation dose reduction techniques were  utilized, including automated  exposure control and exposure modulation based on body size.     This report was finalized on 6/4/2021 4:58 PM by Dr. Stanton Hoyos M.D.       MRI Brain Without Contrast [[id number]] Collected: 06/04/21 1101     Updated: 06/04/21 1215    Narrative:      MRI EXAMINATION BRAIN WITHOUT CONTRAST     HISTORY: TIA.     COMPARISON: CT head 06/03/2021.     TECHNIQUE: A MRI examination of brain was performed utilizing sagittal  T1, axial diffusion, T1, T2, T2 FLAIR and gradient echo T2 sequences.      FINDINGS: There is no evidence of restricted diffusion or of  hydrocephalus. Prominent CSF is noted anterolateral and lateral to the  cerebellar hemispheres bilaterally similar in appearance as compared to  the CT examination of 05/11/2012. Moderate small vessel ischemic disease  is noted. There is no evidence of mass or mass effect on this  noncontrasted MRI examination of the brain.     On the axial gradient echo T2 sequence a subtle area of signal loss is  appreciated related which appears to be dural-based overlying the  superolateral aspect of left frontal lobe. This could represent a  partially calcified meningioma and measures approximately 11 mm in the  AP dimension and 8 mm in transverse dimension.        Impression:      1.  Moderate small vessel ischemic disease. There is no evidence of  acute infarction.     This report was finalized on 6/4/2021 12:12 PM by Dr. Stanton Hoyos M.D.       CT Head Without Contrast [[id number]] Collected: 06/03/21 1517     Updated: 06/04/21 1003    Narrative:      CT SCAN HEAD WITHOUT CONTRAST     CLINICAL HISTORY: Less responsive. Right-sided weakness.     CT scan of the head was obtained with 3 mm axial images. No intravenous  contrast was administered.     Comparison is made to previous CT scan of the head dated 07/06/2018.     FINDINGS:     There are moderate changes of chronic small vessel ischemic phenomena.  No significant interval change is seen in  these findings. The  ventricles, sulci, and cisterns are age appropriate. The basal ganglia  and thalami are unremarkable in appearance. The posterior fossa  structures are within normal limits. Atherosclerotic changes are seen  within the intracranial vasculature.     Incidental note is made of a 3 mm ossific or densely calcific  extra-axial lesion lateral to the left frontal lobe compatible with  either a densely calcified meningioma or an osteoma. This is unchanged  when compared to the prior head CT dated 07/06/2018.       Impression:         No CT evidence for acute intracranial pathology.      Further evaluation could be performed with MR imaging, as clinically  indicated.     Moderate changes of chronic small vessel ischemic phenomena are again  incidentally noted.     A 3 mm ossific or densely calcific lesion lateral to the left frontal  lobe compatible with either an osteoma or densely calcified meningioma  is unchanged.     Radiation dose reduction techniques were utilized, including automated  exposure control and exposure modulation based on body size.     This report was finalized on 6/4/2021 9:59 AM by Dr. Timbo Coughlin M.D.               aspirin, 325 mg, Oral, Daily   Or  aspirin, 300 mg, Rectal, Daily  atorvastatin, 80 mg, Oral, Nightly  buPROPion, 75 mg, Oral, Q12H  cholecalciferol, 1,000 Units, Oral, Daily  clopidogrel, 75 mg, Oral, Daily  dilTIAZem CD, 300 mg, Oral, Q24H  enoxaparin, 30 mg, Subcutaneous, Q24H  hydrALAZINE, 100 mg, Oral, Q8H  insulin glargine, 10 Units, Subcutaneous, Nightly  insulin lispro, 0-14 Units, Subcutaneous, TID AC  isosorbide mononitrate, 120 mg, Oral, Daily  metoprolol succinate XL, 150 mg, Oral, Nightly  sodium chloride, 10 mL, Intravenous, Q12H  sodium chloride, 10 mL, Intravenous, Q12H  spironolactone, 25 mg, Oral, Daily  [START ON 6/7/2021] torsemide, 20 mg, Oral, Daily           Assessment/Plan   Non olig COLLIN - prerenal azotemia picture initially related to  over-diuresis with initial Cr 1.7, improved to ~ 1 with IVF and diuretic cessation, which was atypically low, and understandably drifting upward with diuretic resumption 1.2 - 1.4 today, still within BL range but she did get IV contrast on 6/4 (48h ago).  K/HCo3 stable.  UA: trace protein, no blood.      CKD stage 3 - multifactorial: DM2, HTN, cardiorenal syn; BL Cr roughly 1.3 - 1.5 lately   Vol overload - vol status tenuous, gather initial vol depletion from diuresis, then became overloaded; torsemide resumed, aldactone added; edema better, no dyspnea; wt's unreliable  -- torsemide reduced 20mg daily today per cardiology  HTN - uncontrolled; though don't need tight control at her age still up to 180 systolic  -- no e/o SRAVANI by doppler; on max hydralazine and hefty doses cardizem & toprol XL, HR 60s - 70s  -- ARB was stopped recent admission, we could resume this tomorrow if Cr stable  DM2, on insulin   PCM, severe, 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  I spoke with another family member after encounter and learned she used to see Dr Hidalgo in our office.          Generalized weakness    PRABHU treated with autoBiPAP    HTN (hypertension)    Type 2 diabetes mellitus, with long-term current use of insulin (CMS/[facility])    CKD (chronic kidney disease) stage 3, GFR 30-59 ml/min (CMS/[facility])    COLLIN (acute kidney injury) (CMS/[facility])    Hypotension    Weight loss    Anemia    Hyperkalemia    Chronic diastolic CHF (congestive heart failure) (CMS/[facility])        I discussed the patient's findings and my recommendations with patient, family and nursing staff    Fili Galindo MD  06/06/21  13:26 EDT      Much of this encounter  note is an electronic transcription/translation of spoken language to printed text. The electronic translation of spoken language may permit erroneous, or at times, nonsensical words or phrases to be inadvertently transcribed; Although I have reviewed the note for such errors, some may still exist

## 2021-06-06 NOTE — PLAN OF CARE
Goal Outcome Evaluation:  Plan of Care Reviewed With: patient  Progress: improving  Outcome Summary: AAOx4 with periodic forgetfulness, no c/o pain, BP elevated periodically no iv labatelol required, room air, up with assistance x2

## 2021-06-06 NOTE — THERAPY TREATMENT NOTE
Patient Name: Denny Ivy  : 8/3/1929    MRN: [id number]                              Today's Date: 2021       Admit Date: 2021    Visit Dx:     ICD-10-CM ICD-9-CM   1. Generalized weakness  R53.1 780.79   2. Acute renal failure, unspecified acute renal failure type (CMS/[facility])  N17.9 584.9   3. Hyperkalemia  E87.5 276.7     Patient Active Problem List   Diagnosis   • PRABHU treated with autoBiPAP   • Hypersomnia due to medical condition   • Chronic congestive heart failure (CMS/[facility])   • HTN (hypertension)   • Type 2 diabetes mellitus, with long-term current use of insulin (CMS/[facility])   • CKD (chronic kidney disease) stage 3, GFR 30-59 ml/min (CMS/HCC)   • COLILN (acute kidney injury) (CMS/HCC)   • Generalized weakness   • Hypotension   • Weight loss   • Anemia   • Hyperkalemia   • Chronic diastolic CHF (congestive heart failure) (CMS/[facility])     Past Medical History:   Diagnosis Date   • Arthritis    • Diabetes 1.5, managed as type 2 (CMS/[facility])    • Edema    • Hypertension    • Hypothyroidism    • Low back pain    • Macular degeneration      Past Surgical History:   Procedure Laterality Date   • CARDIAC SURGERY     • CHOLECYSTECTOMY     • HIATAL HERNIA REPAIR     • HYSTERECTOMY       General Information    No documentation.       Mobility     Row Name 21 145          Bed Mobility    Sit-Supine Gonzales (Bed Mobility)  minimum assist (75% patient effort);verbal cues;2 person assist  -RA     Comment (Bed Mobility)  up in chair at start, did not want L LE touched with sit to supine transition, PT assisted L LE by placing hand under L heel  -RA     Row Name 21 145          Sit-Stand Transfer    Sit-Stand Gonzales (Transfers)  minimum assist (75% patient effort);verbal cues;2 person assist  -RA     Assistive Device (Sit-Stand Transfers)  walker, front-wheeled  -RA     Row Name 21 145          Gait/Stairs (Locomotion)    Gonzales Level (Gait)  minimum assist (75% patient effort) 1-2  person assist  -RA     Assistive Device (Gait)  walker, front-wheeled  -RA     Distance in Feet (Gait)  30'  -RA     Deviations/Abnormal Patterns (Gait)  lenny decreased;gait speed decreased;weight shifting decreased;stride length decreased  -RA     Bilateral Gait Deviations  forward flexed posture;heel strike decreased;weight shift ability decreased  -RA     Comment (Gait/Stairs)  Patient flexed fwd leaning FA on wx which pt. visitor (?dtr) confirmed as the way patient walked with her wx prior to admit.  She became more forward flexed and put hands on front bar of wx w/ FA resting on wx handgrips toward end of ambulation.  -RA       User Key  (r) = Recorded By, (t) = Taken By, (c) = Cosigned By    Initials Name Provider Type    RA Zenaida Valladares, PT Physical Therapist        Obj/Interventions    No documentation.       Goals/Plan    No documentation.       Clinical Impression     Row Name 06/06/21 1457          Pain Scale: Numbers Pre/Post-Treatment    Pain Location - Side  Left  -RA     Pain Location - Orientation  lower  -RA     Pain Location  extremity  -RA     Pre/Posttreatment Pain Comment  patient did not quatify L LE pain when asked, just stated she didn't want anybody to touch her leg  -RA     Pain Intervention(s)  Medication (See MAR);Ambulation/increased activity;Repositioned;Rest  -RA     Row Name 06/06/21 1454          Plan of Care Review    Plan of Care Reviewed With  patient  -RA     Progress  improving  -RA     Outcome Summary  Patient continues to exhibit significantly FF posture with forearms resting on wx handgrips with ambulation which her visitor confirms is the way she used her wx prior to admission.  She required min A of 1-2 for transfers/ambulation and was noted to fatigue quickly with activity.  -RA     Row Name 06/06/21 1457          Positioning and Restraints    Pre-Treatment Position  sitting in chair/recliner  -RA     Post Treatment Position  bed  -RA     In Bed  supine;with  family/caregiver;encouraged to call for assist;call light within reach  -RA       User Key  (r) = Recorded By, (t) = Taken By, (c) = Cosigned By    Initials Name Provider Type    Zenaida Dahl PT Physical Therapist        Outcome Measures     Row Name 06/06/21 1502          How much help from another person do you currently need...    Turning from your back to your side while in flat bed without using bedrails?  3  -RA     Moving from lying on back to sitting on the side of a flat bed without bedrails?  2  -RA     Moving to and from a bed to a chair (including a wheelchair)?  3  -RA     Standing up from a chair using your arms (e.g., wheelchair, bedside chair)?  3  -RA     Climbing 3-5 steps with a railing?  1  -RA     To walk in hospital room?  2  -RA     AM-PAC 6 Clicks Score (PT)  14  -RA     Row Name 06/06/21 1502          Functional Assessment    Outcome Measure Options  AM-PAC 6 Clicks Basic Mobility (PT)  -RA       User Key  (r) = Recorded By, (t) = Taken By, (c) = Cosigned By    Initials Name Provider Type    Zenaida Dahl PT Physical Therapist        Physical Therapy Education                 Title: PT OT SLP Therapies (Done)     Topic: Physical Therapy (Done)     Point: Mobility training (Done)     Learning Progress Summary           Patient Acceptance, E,TB,D, VU,NR by RA at 6/6/2021 1502    Acceptance, E,D, DU,NR by  at 6/1/2021 1500    Acceptance, E, VU by  at 5/31/2021 1444                   Point: Home exercise program (Done)     Learning Progress Summary           Patient Acceptance, E,TB,D, VU,NR by RA at 6/6/2021 1502    Acceptance, E, VU by  at 5/31/2021 1444                   Point: Body mechanics (Done)     Learning Progress Summary           Patient Acceptance, E,TB,D, VU,NR by RA at 6/6/2021 1502    Acceptance, E,D, DU,NR by PC at 6/1/2021 1500    Acceptance, E, VU by  at 5/31/2021 1444                   Point: Precautions (Done)     Learning Progress Summary            Patient Acceptance, E,TB,D, VU,NR by RA at 6/6/2021 1502    Acceptance, E,D, DU,NR by  at 6/1/2021 1500    Acceptance, E, VU by  at 5/31/2021 1444                               User Key     Initials Effective Dates Name Provider Type Discipline     04/06/17 -  Zenaida Valladares, PT Physical Therapist PT    PC 04/03/18 -  Anum Zazueta PT Physical Therapist PT    CH 04/03/18 -  Semaj España PT Physical Therapist PT              PT Recommendation and Plan     Plan of Care Reviewed With: patient  Progress: improving  Outcome Summary: Patient continues to exhibit significantly FF posture with forearms resting on wx handgrips with ambulation which her visitor confirms is the way she used her wx prior to admission.  She required min A of 1-2 for transfers/ambulation and was noted to fatigue quickly with activity.  Patient was intermittently wearing a face mask during this therapy encounter. Therapist used appropriate personal protective equipment including eye protection, mask, and gloves.  Mask used was standard procedure mask. Appropriate PPE was worn during the entire therapy session. Hand hygiene was completed before and after therapy session. Patient is not in enhanced droplet precautions.      Time Calculation:   PT Charges     Row Name 06/06/21 1450             Time Calculation    Start Time  1409  -RA      Stop Time  1430  -      Time Calculation (min)  21 min  -      PT Received On  06/06/21  -      PT - Next Appointment  06/07/21  -        User Key  (r) = Recorded By, (t) = Taken By, (c) = Cosigned By    Initials Name Provider Type     Zenaida Valladares PT Physical Therapist        Therapy Charges for Today     Code Description Service Date Service Provider Modifiers Qty    03047066887 HC PT THER PROC EA 15 MIN 6/6/2021 Zenaida Valladares, PT GP 1    68091190926 HC PT THER SUPP EA 15 MIN 6/6/2021 Zenaida Valladares, PT GP 1          PT G-Codes  Outcome Measure Options: AM-PAC 6 Clicks Basic  Mobility (PT)  AM-PAC 6 Clicks Score (PT): 14  AM-PAC 6 Clicks Score (OT): 13  Modified Morton Scale: 4 - Moderately severe disability.  Unable to walk without assistance, and unable to attend to own bodily needs without assistance.    Zenaida Valladares, PT  6/6/2021

## 2021-06-07 ENCOUNTER — APPOINTMENT (OUTPATIENT)
Dept: CARDIOLOGY | Facility: HOSPITAL | Age: 86
End: 2021-06-07

## 2021-06-07 LAB
ALBUMIN SERPL-MCNC: 3.3 G/DL (ref 3.5–5.2)
ANION GAP SERPL CALCULATED.3IONS-SCNC: 11.2 MMOL/L (ref 5–15)
BUN SERPL-MCNC: 39 MG/DL (ref 8–23)
BUN/CREAT SERPL: 24.7 (ref 7–25)
CALCIUM SPEC-SCNC: 9.2 MG/DL (ref 8.2–9.6)
CHLORIDE SERPL-SCNC: 101 MMOL/L (ref 98–107)
CO2 SERPL-SCNC: 28.8 MMOL/L (ref 22–29)
CREAT SERPL-MCNC: 1.58 MG/DL (ref 0.57–1)
DEPRECATED RDW RBC AUTO: 41.7 FL (ref 37–54)
ERYTHROCYTE [DISTWIDTH] IN BLOOD BY AUTOMATED COUNT: 12.7 % (ref 12.3–15.4)
GFR SERPL CREATININE-BSD FRML MDRD: 37 ML/MIN/1.73
GLUCOSE BLDC GLUCOMTR-MCNC: 222 MG/DL (ref 70–130)
GLUCOSE BLDC GLUCOMTR-MCNC: 248 MG/DL (ref 70–130)
GLUCOSE BLDC GLUCOMTR-MCNC: 92 MG/DL (ref 70–130)
GLUCOSE SERPL-MCNC: 79 MG/DL (ref 65–99)
HCT VFR BLD AUTO: 32.5 % (ref 34–46.6)
HGB BLD-MCNC: 10.9 G/DL (ref 12–15.9)
MAGNESIUM SERPL-MCNC: 1.9 MG/DL (ref 1.7–2.3)
MCH RBC QN AUTO: 30.4 PG (ref 26.6–33)
MCHC RBC AUTO-ENTMCNC: 33.5 G/DL (ref 31.5–35.7)
MCV RBC AUTO: 90.8 FL (ref 79–97)
PHOSPHATE SERPL-MCNC: 4 MG/DL (ref 2.5–4.5)
PLATELET # BLD AUTO: 185 10*3/MM3 (ref 140–450)
PMV BLD AUTO: 10.5 FL (ref 6–12)
POTASSIUM SERPL-SCNC: 3.9 MMOL/L (ref 3.5–5.2)
RBC # BLD AUTO: 3.58 10*6/MM3 (ref 3.77–5.28)
SODIUM SERPL-SCNC: 141 MMOL/L (ref 136–145)
WBC # BLD AUTO: 5.43 10*3/MM3 (ref 3.4–10.8)

## 2021-06-07 PROCEDURE — 99232 SBSQ HOSP IP/OBS MODERATE 35: CPT | Performed by: NURSE PRACTITIONER

## 2021-06-07 PROCEDURE — 80069 RENAL FUNCTION PANEL: CPT | Performed by: INTERNAL MEDICINE

## 2021-06-07 PROCEDURE — 82962 GLUCOSE BLOOD TEST: CPT

## 2021-06-07 PROCEDURE — 25010000002 ENOXAPARIN PER 10 MG: Performed by: INTERNAL MEDICINE

## 2021-06-07 PROCEDURE — 63710000001 INSULIN LISPRO (HUMAN) PER 5 UNITS: Performed by: NURSE PRACTITIONER

## 2021-06-07 PROCEDURE — 85027 COMPLETE CBC AUTOMATED: CPT | Performed by: INTERNAL MEDICINE

## 2021-06-07 PROCEDURE — 83735 ASSAY OF MAGNESIUM: CPT | Performed by: INTERNAL MEDICINE

## 2021-06-07 PROCEDURE — 97530 THERAPEUTIC ACTIVITIES: CPT

## 2021-06-07 PROCEDURE — 63710000001 INSULIN GLARGINE PER 5 UNITS: Performed by: NURSE PRACTITIONER

## 2021-06-07 RX ORDER — ASPIRIN 81 MG/1
81 TABLET ORAL DAILY
Status: DISCONTINUED | OUTPATIENT
Start: 2021-06-08 | End: 2021-06-08 | Stop reason: HOSPADM

## 2021-06-07 RX ORDER — CLONIDINE HYDROCHLORIDE 0.1 MG/1
0.1 TABLET ORAL EVERY 12 HOURS SCHEDULED
Status: DISCONTINUED | OUTPATIENT
Start: 2021-06-07 | End: 2021-06-08 | Stop reason: HOSPADM

## 2021-06-07 RX ORDER — ATORVASTATIN CALCIUM 20 MG/1
40 TABLET, FILM COATED ORAL NIGHTLY
Status: DISCONTINUED | OUTPATIENT
Start: 2021-06-07 | End: 2021-06-08 | Stop reason: HOSPADM

## 2021-06-07 RX ADMIN — CLONIDINE HYDROCHLORIDE 0.1 MG: 0.1 TABLET ORAL at 23:00

## 2021-06-07 RX ADMIN — BUPROPION HYDROCHLORIDE 75 MG: 75 TABLET, FILM COATED ORAL at 09:10

## 2021-06-07 RX ADMIN — HYDRALAZINE HYDROCHLORIDE 100 MG: 50 TABLET, FILM COATED ORAL at 15:42

## 2021-06-07 RX ADMIN — SODIUM CHLORIDE, PRESERVATIVE FREE 10 ML: 5 INJECTION INTRAVENOUS at 09:11

## 2021-06-07 RX ADMIN — CLONIDINE HYDROCHLORIDE 0.1 MG: 0.1 TABLET ORAL at 12:47

## 2021-06-07 RX ADMIN — TORSEMIDE 20 MG: 20 TABLET ORAL at 12:47

## 2021-06-07 RX ADMIN — Medication 1000 UNITS: at 09:10

## 2021-06-07 RX ADMIN — BUPROPION HYDROCHLORIDE 75 MG: 75 TABLET, FILM COATED ORAL at 23:01

## 2021-06-07 RX ADMIN — METOPROLOL SUCCINATE 150 MG: 100 TABLET, EXTENDED RELEASE ORAL at 23:01

## 2021-06-07 RX ADMIN — SODIUM CHLORIDE, PRESERVATIVE FREE 10 ML: 5 INJECTION INTRAVENOUS at 23:02

## 2021-06-07 RX ADMIN — CLOPIDOGREL 75 MG: 75 TABLET, FILM COATED ORAL at 09:09

## 2021-06-07 RX ADMIN — ATORVASTATIN CALCIUM 40 MG: 20 TABLET, FILM COATED ORAL at 23:01

## 2021-06-07 RX ADMIN — INSULIN LISPRO 5 UNITS: 100 INJECTION, SOLUTION INTRAVENOUS; SUBCUTANEOUS at 12:46

## 2021-06-07 RX ADMIN — HYDRALAZINE HYDROCHLORIDE 100 MG: 50 TABLET, FILM COATED ORAL at 23:00

## 2021-06-07 RX ADMIN — DILTIAZEM HYDROCHLORIDE 300 MG: 180 CAPSULE, COATED, EXTENDED RELEASE ORAL at 09:09

## 2021-06-07 RX ADMIN — ISOSORBIDE MONONITRATE 120 MG: 60 TABLET ORAL at 09:10

## 2021-06-07 RX ADMIN — ASPIRIN 325 MG: 325 TABLET ORAL at 09:10

## 2021-06-07 RX ADMIN — ENOXAPARIN SODIUM 30 MG: 100 INJECTION SUBCUTANEOUS at 15:44

## 2021-06-07 RX ADMIN — HYDRALAZINE HYDROCHLORIDE 100 MG: 50 TABLET, FILM COATED ORAL at 05:20

## 2021-06-07 RX ADMIN — INSULIN GLARGINE 10 UNITS: 100 INJECTION, SOLUTION SUBCUTANEOUS at 23:00

## 2021-06-07 NOTE — PROGRESS NOTES
"    Name: Denny Ivy ADMIT: 2021   : 8/3/1929  PCP: Мария Wilks MD    MRN: [id number] LOS: 7 days   AGE/SEX: 89 y.o. female  ROOM: [facility]     Subjective   Subjective     Doing okay today.  No complaints.  Patient's son is at bedside.  He reports to me that he thinks that the pain stent he is \"wilting\".  He says that she perked up when she first presented, but appears slightly more confused and he's concerned that she's slumping down in bed instead of sitting up straight.       Objective   Objective   Vital Signs  Temp:  [98 °F (36.7 °C)-98.8 °F (37.1 °C)] 98.7 °F (37.1 °C)  Heart Rate:  [67-76] 71  Resp:  [14-18] 18  BP: (141-170)/(57-95) 141/60  SpO2:  [93 %-99 %] 96 %  on   ;   Device (Oxygen Therapy): room air  Body mass index is 35.81 kg/m².  Physical Exam  Constitutional:       General: She is not in acute distress.     Appearance: She is obese. She is ill-appearing (chronically).   Cardiovascular:      Rate and Rhythm: Normal rate and regular rhythm.      Heart sounds: Normal heart sounds.   Pulmonary:      Effort: Pulmonary effort is normal.      Breath sounds: Normal breath sounds.   Abdominal:      General: Bowel sounds are normal.      Palpations: Abdomen is soft.   Musculoskeletal:         General: Tenderness present.      Right lower leg: Edema present.      Left lower leg: Edema present.      Comments: 1+ BLE pitting edema   Neurological:      Mental Status: She is alert and oriented to person, place, and time.   Psychiatric:         Mood and Affect: Mood normal.         Behavior: Behavior normal.         Results Review     I reviewed the patient's new clinical results.  Results from last 7 days   Lab Units 21  0553 21  0432 21  0549 21  0450   WBC 10*3/mm3 5.43 4.68 4.81 4.44   HEMOGLOBIN g/dL 10.9* 10.1* 10.3* 10.1*   PLATELETS 10*3/mm3 185 186 182 160     Results from last 7 days   Lab Units 21  0553 21  0432 21  0521 21  0549 "   SODIUM mmol/L 141 140 140 142   POTASSIUM mmol/L 3.9 4.2 4.2 4.4   CHLORIDE mmol/L 101 102 101 102   CO2 mmol/L 28.8 29.0 30.3* 27.1   BUN mg/dL 39* 37* 32* 32*   CREATININE mg/dL 1.58* 1.46* 1.20* 1.27*   GLUCOSE mg/dL 79 98 88 112*   Estimated Creatinine Clearance: 23.1 mL/min (A) (by C-G formula based on SCr of 1.58 mg/dL (H)).  Results from last 7 days   Lab Units 06/07/21  0553 06/06/21  0432 06/04/21  0549 06/03/21  0545 06/01/21  0601   ALBUMIN g/dL 3.30* 3.00* 3.20* 3.70 3.10*   BILIRUBIN mg/dL  --   --   --   --  0.3   ALK PHOS U/L  --   --   --   --  46   AST (SGOT) U/L  --   --   --   --  12   ALT (SGPT) U/L  --   --   --   --  20     Results from last 7 days   Lab Units 06/07/21  0553 06/06/21  0432 06/05/21  0521 06/04/21  0549 06/03/21  0545   CALCIUM mg/dL 9.2 9.1 9.4 9.4 9.6   ALBUMIN g/dL 3.30* 3.00*  --  3.20* 3.70   MAGNESIUM mg/dL 1.9 1.9  --  1.8 1.9   PHOSPHORUS mg/dL 4.0 3.7  --  3.4 2.8       COVID19   Date Value Ref Range Status   05/30/2021 Not Detected Not Detected - Ref. Range Final   05/22/2021 Not Detected Not Detected - Ref. Range Final     Glucose   Date/Time Value Ref Range Status   06/07/2021 1206 222 (H) 70 - 130 mg/dL Final   06/07/2021 0616 92 70 - 130 mg/dL Final   06/06/2021 2055 118 70 - 130 mg/dL Final   06/06/2021 1956 127 70 - 130 mg/dL Final   06/06/2021 1638 144 (H) 70 - 130 mg/dL Final   06/06/2021 1045 182 (H) 70 - 130 mg/dL Final   06/06/2021 0632 102 70 - 130 mg/dL Final       Duplex Renal Artery - Bilateral Complete CAR  · Normal right renal artery.  · Normal left renal artery.       Scheduled Medications  aspirin, 325 mg, Oral, Daily   Or  aspirin, 300 mg, Rectal, Daily  atorvastatin, 80 mg, Oral, Nightly  buPROPion, 75 mg, Oral, Q12H  cholecalciferol, 1,000 Units, Oral, Daily  cloNIDine, 0.1 mg, Oral, Q12H  clopidogrel, 75 mg, Oral, Daily  dilTIAZem CD, 300 mg, Oral, Q24H  enoxaparin, 30 mg, Subcutaneous, Q24H  hydrALAZINE, 100 mg, Oral, Q8H  insulin glargine, 10  Units, Subcutaneous, Nightly  insulin lispro, 0-14 Units, Subcutaneous, TID AC  isosorbide mononitrate, 120 mg, Oral, Daily  metoprolol succinate XL, 150 mg, Oral, Nightly  sodium chloride, 10 mL, Intravenous, Q12H  sodium chloride, 10 mL, Intravenous, Q12H  spironolactone, 25 mg, Oral, Daily  torsemide, 20 mg, Oral, Daily    Infusions   Diet  Diet Regular; Consistent Carbohydrate       Assessment/Plan     Active Hospital Problems    Diagnosis  POA   • **Generalized weakness [R53.1]  Yes   • Hypotension [I95.9]  Yes   • Weight loss [R63.4]  Yes   • Anemia [D64.9]  Yes   • Hyperkalemia [E87.5]  Yes   • Chronic diastolic CHF (congestive heart failure) (CMS/[facility]) [I50.32]  Yes   • COLLIN (acute kidney injury) (CMS/[facility]) [N17.9]  Yes   • HTN (hypertension) [I10]  Yes   • Type 2 diabetes mellitus, with long-term current use of insulin (CMS/[facility]) [E11.9, Z79.4]  Not Applicable   • CKD (chronic kidney disease) stage 3, GFR 30-59 ml/min (CMS/[facility]) [N18.30]  Yes   • PRABHU treated with autoBiPAP [G47.33]  Yes      Resolved Hospital Problems   No resolved problems to display.       89 y.o. female admitted with Generalized weakness.    Hypotension from over diuresis-resolved  Essential hypertension-BP elevated but ok for hospital setting. Currently on clonidine, dilt, imdur, hydralazine, metoprolol, spironolactone, torsemide  DM2-on insulin. a1c 6.36. glucose at goal.   COLLIN on CKD3-due to diuresis. Overall improved since admission, but slightly worse than yesterday. Baseline 1.3-1.5. nephology assisting   Brief episode of metabolic encephalopathy-stroke ruled out. [place] to represent TIA. Neurology recommends DAPT for 21 days followed by  indefinitely.  Incidental chronic small left frontal meningioma  Thyroid cysts, bilaterally, with the largest measuring 2.6 cm in diameter and demonstrating thin septations centrally seen on CT. TSH was normal. U/S as an outpatient for further evaluation.  Chronic diastolic heart failure-on oral  diuretics  Left leg pain-doppler u/s negative  HLD  PRABHU    · Lovenox 30 mg SC daily for DVT prophylaxis.  · Full code.  · Discussed with patient, family and nursing staff.  · Anticipate discharge home with home health in 1-2 days. with cleared by nephrology      Chauncey Dyer MD  [facility]  06/07/21  13:05 EDT    I wore protective equipment throughout this patient encounter including a face mask, gloves and protective eyewear.  Hand hygiene was performed before donning protective equipment and after removal when leaving the room.

## 2021-06-07 NOTE — PLAN OF CARE
Goal Outcome Evaluation:  Plan of Care Reviewed With: patient        Progress: improving  Outcome Summary: Pt tolerated treatment with c/o left LE pain when touched. Pt is Ravi with rolling left and right and ModA with supine to sit. Pt performed sit<->stand transfer with MinAX2 and a bed to bsc transfer with MinAX2.  Pt needing to use bsc due to having another BM and needed to sit for a few minutes. Will continue to progress pt as tolerated.    ..Patient was not wearing a face mask during this therapy encounter. Therapist used appropriate personal protective equipment including eye protection, mask, and gloves.  Mask used was standard procedure mask. Appropriate PPE was worn during the entire therapy session. Hand hygiene was completed before and after therapy session. Patient is not in enhanced droplet precautions.

## 2021-06-07 NOTE — THERAPY TREATMENT NOTE
Patient Name: Denny Ivy  : 8/3/1929    MRN: [id number]                              Today's Date: 2021       Admit Date: 2021    Visit Dx:     ICD-10-CM ICD-9-CM   1. Generalized weakness  R53.1 780.79   2. Acute renal failure, unspecified acute renal failure type (CMS/[facility])  N17.9 584.9   3. Hyperkalemia  E87.5 276.7     Patient Active Problem List   Diagnosis   • PRABHU treated with autoBiPAP   • Hypersomnia due to medical condition   • Chronic congestive heart failure (CMS/[facility])   • HTN (hypertension)   • Type 2 diabetes mellitus, with long-term current use of insulin (CMS/[facility])   • CKD (chronic kidney disease) stage 3, GFR 30-59 ml/min (CMS/[facility])   • COLLIN (acute kidney injury) (CMS/[facility])   • Generalized weakness   • Hypotension   • Weight loss   • Anemia   • Hyperkalemia   • Chronic diastolic CHF (congestive heart failure) (CMS/[facility])     Past Medical History:   Diagnosis Date   • Arthritis    • Diabetes 1.5, managed as type 2 (CMS/[facility])    • Edema    • Hypertension    • Hypothyroidism    • Low back pain    • Macular degeneration      Past Surgical History:   Procedure Laterality Date   • CARDIAC SURGERY     • CHOLECYSTECTOMY     • HIATAL HERNIA REPAIR     • HYSTERECTOMY       General Information     Row Name 21 165          Physical Therapy Time and Intention    Document Type  therapy note (daily note)  -     Mode of Treatment  individual therapy;physical therapy  -     Row Name 21          General Information    Existing Precautions/Restrictions  fall  -     Row Name 21          Cognition    Orientation Status (Cognition)  oriented to;person;place  -     Row Name 21          Safety Issues, Functional Mobility    Impairments Affecting Function (Mobility)  balance;strength;endurance/activity tolerance;postural/trunk control;pain  -EB       User Key  (r) = Recorded By, (t) = Taken By, (c) = Cosigned By    Initials Name Provider Type    EB Rahda Garcia  PTA Physical Therapy Assistant        Mobility     Row Name 06/07/21 1700          Bed Mobility    Bed Mobility  rolling right;rolling left  -EB     All Activities, Bucks (Bed Mobility)  minimum assist (75% patient effort)  -EB     Rolling Left Bucks (Bed Mobility)  minimum assist (75% patient effort)  -EB     Supine-Sit Bucks (Bed Mobility)  moderate assist (50% patient effort);verbal cues;nonverbal cues (demo/gesture)  -EB     Sit-Supine Bucks (Bed Mobility)  not tested  -EB     Assistive Device (Bed Mobility)  bed rails;head of bed elevated  -EB     Comment (Bed Mobility)  pt does not want left LE touched due to pain. Pt rolled side to side due to toileting needs.  -     Row Name 06/07/21 1700          Transfers    Comment (Transfers)  transferrred pt from bed to bsc. Pt started to have another BM. notified RN and nsg aid.  -     Row Name 06/07/21 1700          Bed-Chair Transfer    Bed-Chair Bucks (Transfers)  minimum assist (75% patient effort);2 person assist;nonverbal cues (demo/gesture);verbal cues  -     Assistive Device (Bed-Chair Transfers)  -- HHA  -     Row Name 06/07/21 1700          Sit-Stand Transfer    Sit-Stand Bucks (Transfers)  minimum assist (75% patient effort);verbal cues;2 person assist  -     Assistive Device (Sit-Stand Transfers)  -- HHA  -     Row Name 06/07/21 1700          Gait/Stairs (Locomotion)    Comment (Gait/Stairs)  deferred ambulation due to pt having another BM and needing to sit on BSC for a few minutes.  -       User Key  (r) = Recorded By, (t) = Taken By, (c) = Cosigned By    Initials Name Provider Type    EB Radha Garcia PTA Physical Therapy Assistant        Obj/Interventions    No documentation.       Goals/Plan    No documentation.       Clinical Impression     Row Name 06/07/21 1704          Pain Scale: Numbers Pre/Post-Treatment    Pain Location - Side  Left  -     Pain Location - Orientation  lower  -      Pain Location  extremity  -EB     Row Name 06/07/21 1704          Plan of Care Review    Plan of Care Reviewed With  patient  -EB     Progress  improving  -EB     Outcome Summary  Pt tolerated treatment with c/o left LE pain when touched. Pt is Ravi with rolling left and right and ModA with supine to sit. Pt performed sit<->stand transfer with MinAX2 and a bed to bsc transfer with MinAX2.  Pt needing to use bsc due to having another BM and needed to sit for a few minutes. Will continue to progress pt as tolerated.  -EB     Row Name 06/07/21 1704          Positioning and Restraints    Pre-Treatment Position  in bed  -EB     Post Treatment Position  bsc  -EB     On BS commode  sitting;call light within reach;with nsg;encouraged to call for assist  -EB       User Key  (r) = Recorded By, (t) = Taken By, (c) = Cosigned By    Initials Name Provider Type    Radha Adhikari PTA Physical Therapy Assistant        Outcome Measures     Row Name 06/07/21 1706          How much help from another person do you currently need...    Turning from your back to your side while in flat bed without using bedrails?  3  -EB     Moving from lying on back to sitting on the side of a flat bed without bedrails?  2  -EB     Moving to and from a bed to a chair (including a wheelchair)?  3  -EB     Standing up from a chair using your arms (e.g., wheelchair, bedside chair)?  3  -EB     Climbing 3-5 steps with a railing?  1  -EB     To walk in hospital room?  2  -EB     AM-PAC 6 Clicks Score (PT)  14  -EB     Row Name 06/07/21 1706          Modified [place] Scale    Modified [place]ip Scale  4 - Moderately severe disability.  Unable to walk without assistance, and unable to attend to own bodily needs without assistance.  -EB       User Key  (r) = Recorded By, (t) = Taken By, (c) = Cosigned By    Initials Name Provider Type    Radha Adhikari PTA Physical Therapy Assistant        Physical Therapy Education                 Title: PT OT SLP Therapies  (Done)     Topic: Physical Therapy (Done)     Point: Mobility training (Done)     Learning Progress Summary           Patient Acceptance, E,D, VU,NR by EB at 6/7/2021 1706    Acceptance, E,TB,D, VU,NR by RA at 6/6/2021 1502    Acceptance, E,D, DU,NR by PC at 6/1/2021 1500    Acceptance, E, VU by  at 5/31/2021 1444                   Point: Home exercise program (Done)     Learning Progress Summary           Patient Acceptance, E,D, VU,NR by EB at 6/7/2021 1706    Acceptance, E,TB,D, VU,NR by RA at 6/6/2021 1502    Acceptance, E, VU by  at 5/31/2021 1444                   Point: Body mechanics (Done)     Learning Progress Summary           Patient Acceptance, E,D, VU,NR by EB at 6/7/2021 1706    Acceptance, E,TB,D, VU,NR by RA at 6/6/2021 1502    Acceptance, E,D, DU,NR by PC at 6/1/2021 1500    Acceptance, E, VU by  at 5/31/2021 1444                   Point: Precautions (Done)     Learning Progress Summary           Patient Acceptance, E,D, VU,NR by EB at 6/7/2021 1706    Acceptance, E,TB,D, VU,NR by RA at 6/6/2021 1502    Acceptance, E,D, DU,NR by  at 6/1/2021 1500    Acceptance, E, VU by  at 5/31/2021 1444                               User Key     Initials Effective Dates Name Provider Type Discipline    RA 04/06/17 -  Zenaida Valladares, PT Physical Therapist PT    PC 04/03/18 -  Anum Zazueta PT Physical Therapist PT    EB 03/10/21 -  Radha Garcia PTA Physical Therapy Assistant PT     04/03/18 -  Semaj España PT Physical Therapist PT              PT Recommendation and Plan     Plan of Care Reviewed With: patient  Progress: improving  Outcome Summary: Pt tolerated treatment with c/o left LE pain when touched. Pt is Ravi with rolling left and right and ModA with supine to sit. Pt performed sit<->stand transfer with MinAX2 and a bed to bsc transfer with MinAX2.  Pt needing to use bsc due to having another BM and needed to sit for a few minutes. Will continue to progress pt as tolerated.     Time  Calculation:   PT Charges     Row Name 06/07/21 1659             Time Calculation    Start Time  1621  -EB      Stop Time  1638  -EB      Time Calculation (min)  17 min  -EB      PT Received On  06/07/21  -EB      PT - Next Appointment  06/08/21  -EB         Time Calculation- PT    Total Timed Code Minutes- PT  17 minute(s)  -EB        User Key  (r) = Recorded By, (t) = Taken By, (c) = Cosigned By    Initials Name Provider Type    EB Radha Garcia PTA Physical Therapy Assistant        Therapy Charges for Today     Code Description Service Date Service Provider Modifiers Qty    81536423893 HC PT THERAPEUTIC ACT EA 15 MIN 6/7/2021 Radha Garcia PTA GP 1    38585860987 HC PT THER SUPP EA 15 MIN 6/7/2021 Radha Garcia PTA GP 1          PT G-Codes  Outcome Measure Options: AM-PAC 6 Clicks Basic Mobility (PT)  AM-PAC 6 Clicks Score (PT): 14  AM-PAC 6 Clicks Score (OT): 13  Modified [place] Scale: 4 - Moderately severe disability.  Unable to walk without assistance, and unable to attend to own bodily needs without assistance.    Radha Garcia PTA  6/7/2021

## 2021-06-07 NOTE — CONSULTS
"Adult Nutrition  Assessment/PES    Patient Name:  Denny Ivy  YOB: 1929  MRN: [id number]  Admit Date:  5/30/2021    Assessment Date:  6/7/2021  Nutrition assessment triggered by MD consult re:nutrition supplement.  EMR reviewed. Admitted with generalized weakness, dyspnea, COLLIN on CKD3, hypertensive urgency.   Spoke with patient's family-poor appetite/po intake; they are assisting with meals and encouraging po intake. Agreed to try nutritional supplement-ordered boost glucose control TID.  Will continue to follow/monitor.    Reason for Assessment     Row Name 06/07/21 1349          Reason for Assessment    Reason For Assessment  physician consult     Diagnosis   generalized weakness, dyspnea 2* HF, COLLIN, hypertensive urgency; CHD3, HTN,DM, anemia         Nutrition/Diet History     Row Name 06/07/21 1348          Nutrition/Diet History    Typical Food/Fluid Intake  poor appetite, family encouraging po intake at meal time-average 25%; agreed to nutritional supplement-ordered boost glucose control TID         Anthropometrics     Row Name 06/07/21 1346          Anthropometrics    Height  154.9 cm (60.98\")        Admit Weight    Admit Weight  86 kg (189 lb 9.5 oz)        Ideal Body Weight (IBW)    Ideal Body Weight (IBW) (kg)  48.11     % of Ideal Body Weight Assessment  -- 178%        Body Mass Index (BMI)    BMI Assessment  BMI 35-39.9: obesity grade II BMI-35.8         Labs/Tests/Procedures/Meds     Row Name 06/07/21 1343          Labs/Procedures/Meds    Lab Results Reviewed  reviewed, pertinent     Lab Results Comments  Glu, Creat, BUN        Diagnostic Tests/Procedures    Diagnostic Test/Procedure Reviewed  reviewed, pertinent        Medications    Pertinent Medications Reviewed  reviewed, pertinent     Pertinent Medications Comments  vitamin D3, insulin, NaCl         Physical Findings     Row Name 06/07/21 1349          Physical Findings    Overall Physical Appearance  overweight B=18     " "    Estimated/Assessed Needs     Row Name 06/07/21 1348          Calculation Measurements    Height  154.9 cm (60.98\")         Nutrition Prescription Ordered     Row Name 06/07/21 1350          Nutrition Prescription PO    Common Modifiers  Consistent Carbohydrate         Evaluation of Received Nutrient/Fluid Intake     Row Name 06/07/21 1350          PO Evaluation    % PO Intake  25               Problem/Interventions:  Problem 1     Row Name 06/07/21 1352          Nutrition Diagnoses Problem 1    Problem 1  Inadequate Nutrient Intake     Etiology (related to)  Functional Diagnosis     Functional Diagnosis  Cognitive deficit     Signs/Symptoms (evidenced by)  Report of Mnimal PO Intake               Intervention Goal     Row Name 06/07/21 1352          Intervention Goal    General  Maintain nutrition;Reduce/improve symptoms     PO  Tolerate PO;Increase intake     Weight  Maintain weight         Nutrition Intervention     Row Name 06/07/21 1352          Nutrition Intervention    RD/Tech Action  Care plan reviewd;Follow Tx progress;Supplement provided         Nutrition Prescription     Row Name 06/07/21 1352          Nutrition Prescription PO    PO Prescription  Begin/change supplement     Supplement  Boost Glucose Control     Supplement Frequency  3 times a day     New PO Prescription Ordered?  Yes         Education/Evaluation     Row Name 06/07/21 1353          Education    Education  Will Instruct as appropriate        Monitor/Evaluation    Monitor  Per protocol           Electronically signed by:  Emmanuelle Wang RD  06/07/21 13:53 EDT  "

## 2021-06-07 NOTE — PROGRESS NOTES
"    Patient Name: Denny Ivy  :8/3/1929  89 y.o.      Patient Care Team:  Мария Wilks MD as PCP - General (Internal Medicine)  Мария Wilks MD as PCP - Internal Medicine    Chief Complaint: follow up hypertension, chronic diastolic heart failure    Interval History:  Creatinine just slightly higher.  Objective   Vital Signs  Temp:  [98 °F (36.7 °C)-98.8 °F (37.1 °C)] 98 °F (36.7 °C)  Heart Rate:  [67-76] 73  Resp:  [14-18] 14  BP: (148-170)/(57-95) 156/58    Intake/Output Summary (Last 24 hours) at 2021 1210  Last data filed at 2021 0521  Gross per 24 hour   Intake 660 ml   Output 700 ml   Net -40 ml     Flowsheet Rows      First Filed Value   Admission Height  154.9 cm (61\") Documented at 2021   Admission Weight  83.8 kg (184 lb 11.2 oz) Documented at 2021          Physical Exam:   General Appearance:   awake. Up in chair.    Lungs:     Clear to auscultation.  Normal respiratory effort and rate.      Heart:    Regular rhythm and normal rate, normal S1 and S2, no murmurs, gallops or rubs.     Chest Wall:    No abnormalities observed   Abdomen:     Soft, nontender, positive bowel sounds.     Extremities:   no cyanosis, clubbing or edema.  No marked joint deformities.  Adequate musculoskeletal strength.       Results Review:    Results from last 7 days   Lab Units 21  0553   SODIUM mmol/L 141   POTASSIUM mmol/L 3.9   CHLORIDE mmol/L 101   CO2 mmol/L 28.8   BUN mg/dL 39*   CREATININE mg/dL 1.58*   GLUCOSE mg/dL 79   CALCIUM mg/dL 9.2         Results from last 7 days   Lab Units 21  0553   WBC 10*3/mm3 5.43   HEMOGLOBIN g/dL 10.9*   HEMATOCRIT % 32.5*   PLATELETS 10*3/mm3 185         Results from last 7 days   Lab Units 21  0553   MAGNESIUM mg/dL 1.9     Results from last 7 days   Lab Units 21  0549   CHOLESTEROL mg/dL 133   TRIGLYCERIDES mg/dL 69   HDL CHOL mg/dL 55   LDL CHOL mg/dL 64               Medication Review:   aspirin, 325 mg, Oral, " Daily   Or  aspirin, 300 mg, Rectal, Daily  atorvastatin, 80 mg, Oral, Nightly  buPROPion, 75 mg, Oral, Q12H  cholecalciferol, 1,000 Units, Oral, Daily  cloNIDine, 0.1 mg, Oral, Q12H  clopidogrel, 75 mg, Oral, Daily  dilTIAZem CD, 300 mg, Oral, Q24H  enoxaparin, 30 mg, Subcutaneous, Q24H  hydrALAZINE, 100 mg, Oral, Q8H  insulin glargine, 10 Units, Subcutaneous, Nightly  insulin lispro, 0-14 Units, Subcutaneous, TID AC  isosorbide mononitrate, 120 mg, Oral, Daily  metoprolol succinate XL, 150 mg, Oral, Nightly  sodium chloride, 10 mL, Intravenous, Q12H  sodium chloride, 10 mL, Intravenous, Q12H  spironolactone, 25 mg, Oral, Daily  torsemide, 20 mg, Oral, Daily              Assessment/Plan       1.  Dyspnea most likely secondary to acute diastolic heart failure. She is back on oral diuretics. Denies SOA now.  2.  Hypertensive urgency   3.  acute on Chronic kidney disease  4.  Diabetes  5.  Obstructive sleep apnea.  6.  Acute mental status change    - she was initially hypotensive with COLLIN. This has resolved and she is back on her home regimen and BP remains uncontrolled. Spironolactone added 6/4. BP improving.creatinine bumped a little yesterday. torsemide reduced.   - nephrology now following and holding spironolactone. Her creatinine is on the higher side of her baseline.   - renal artery duplex without renal artery stenosis  - discussed with son at bedside.  - BP stable enough for discharge from my standpoint. Appreciate nephrology input as her kidney function and volume status are tenuous.     DENNY Lopez  [facility]  06/07/21  12:10 EDT

## 2021-06-07 NOTE — PROGRESS NOTES
" LOS: 7 days   Patient Care Team:  Мария Wilks MD as PCP - General (Internal Medicine)  Мария Wilks MD as PCP - Internal Medicine        Subjective   She denies any shortness of breath.  Complaining of bilateral lower extremity edema.   Has intermittent episodes of orthopnea.           Objective     Vital Sign Min/Max for last 24 hours  Temp  Min: 98 °F (36.7 °C)  Max: 98.8 °F (37.1 °C)   BP  Min: 148/60  Max: 170/65   Pulse  Min: 67  Max: 76   Resp  Min: 14  Max: 18   SpO2  Min: 93 %  Max: 99 %   No data recorded   Weight  Min: 86 kg (189 lb 8 oz)  Max: 86 kg (189 lb 8 oz)     Flowsheet Rows      First Filed Value   Admission Height  154.9 cm (61\") Documented at 05/30/2021 2110   Admission Weight  83.8 kg (184 lb 11.2 oz) Documented at 05/30/2021 2110          No intake/output data recorded.  I/O last 3 completed shifts:  In: 780 [P.O.:780]  Out: 1050 [Urine:1050]    Objective  General: Awake, not in distress, comfortable.  HEENT: Anicteric sclera, conjunctiva injected, no sinus tenderness, oral mucosa moist.  Neck: supple, no thyromegaly and trachea is midline. No JVD.  Heart: Regular rate and rhythm no murmur S1-S2 normal.  Lungs: Bilateral crackles more prominent on the left.  Abdomen:  soft, not distended, positive bowel sounds.  Nontender, no rebound tenderness.  No  CVA tenderness   Genitourinary:   Extremities trace  lower extremity edema.  Positive peripheral pulse wrinkles present  Neurologic  examination: Alert, oriented.  Moves all extremities    Results Review:     I reviewed the patient's new clinical results.    WBC WBC   Date Value Ref Range Status   06/07/2021 5.43 3.40 - 10.80 10*3/mm3 Final   06/06/2021 4.68 3.40 - 10.80 10*3/mm3 Final      HGB Hemoglobin   Date Value Ref Range Status   06/07/2021 10.9 (L) 12.0 - 15.9 g/dL Final   06/06/2021 10.1 (L) 12.0 - 15.9 g/dL Final      HCT Hematocrit   Date Value Ref Range Status   06/07/2021 32.5 (L) 34.0 - 46.6 % Final   06/06/2021 31.6 (L) 34.0 " - 46.6 % Final      Platlets No results found for: LABPLAT   MCV MCV   Date Value Ref Range Status   06/07/2021 90.8 79.0 - 97.0 fL Final   06/06/2021 92.1 79.0 - 97.0 fL Final          Sodium Sodium   Date Value Ref Range Status   06/07/2021 141 136 - 145 mmol/L Final   06/06/2021 140 136 - 145 mmol/L Final   06/05/2021 140 136 - 145 mmol/L Final      Potassium Potassium   Date Value Ref Range Status   06/07/2021 3.9 3.5 - 5.2 mmol/L Final   06/06/2021 4.2 3.5 - 5.2 mmol/L Final   06/05/2021 4.2 3.5 - 5.2 mmol/L Final     Comment:     Slight hemolysis detected by analyzer. Results may be affected.      Chloride Chloride   Date Value Ref Range Status   06/07/2021 101 98 - 107 mmol/L Final   06/06/2021 102 98 - 107 mmol/L Final   06/05/2021 101 98 - 107 mmol/L Final      CO2 CO2   Date Value Ref Range Status   06/07/2021 28.8 22.0 - 29.0 mmol/L Final   06/06/2021 29.0 22.0 - 29.0 mmol/L Final   06/05/2021 30.3 (H) 22.0 - 29.0 mmol/L Final      BUN BUN   Date Value Ref Range Status   06/07/2021 39 (H) 8 - 23 mg/dL Final   06/06/2021 37 (H) 8 - 23 mg/dL Final   06/05/2021 32 (H) 8 - 23 mg/dL Final      Creatinine Creatinine   Date Value Ref Range Status   06/07/2021 1.58 (H) 0.57 - 1.00 mg/dL Final   06/06/2021 1.46 (H) 0.57 - 1.00 mg/dL Final   06/05/2021 1.20 (H) 0.57 - 1.00 mg/dL Final      Calcium Calcium   Date Value Ref Range Status   06/07/2021 9.2 8.2 - 9.6 mg/dL Final   06/06/2021 9.1 8.2 - 9.6 mg/dL Final   06/05/2021 9.4 8.2 - 9.6 mg/dL Final      PO4 No results found for: CAPO4   Albumin Albumin   Date Value Ref Range Status   06/07/2021 3.30 (L) 3.50 - 5.20 g/dL Final   06/06/2021 3.00 (L) 3.50 - 5.20 g/dL Final      Magnesium Magnesium   Date Value Ref Range Status   06/07/2021 1.9 1.7 - 2.3 mg/dL Final   06/06/2021 1.9 1.7 - 2.3 mg/dL Final      Uric Acid No results found for: URICACID     Medication Review:      Current Facility-Administered Medications:   •  acetaminophen (TYLENOL) tablet 650 mg, 650  mg, Oral, Q4H PRN, 650 mg at 06/05/21 2125 **OR** acetaminophen (TYLENOL) 160 MG/5ML solution 650 mg, 650 mg, Oral, Q4H PRN **OR** acetaminophen (TYLENOL) suppository 650 mg, 650 mg, Rectal, Q4H PRN, Marlene Rizvi APRN  •  aspirin tablet 325 mg, 325 mg, Oral, Daily, 325 mg at 06/07/21 0910 **OR** aspirin suppository 300 mg, 300 mg, Rectal, Daily, Nico Raines MD  •  atorvastatin (LIPITOR) tablet 80 mg, 80 mg, Oral, Nightly, Nico Raines MD, 80 mg at 06/06/21 2010  •  buPROPion (WELLBUTRIN) tablet 75 mg, 75 mg, Oral, Q12H, Kelsi Wilks APRN, 75 mg at 06/07/21 0910  •  cholecalciferol (VITAMIN D3) tablet 1,000 Units, 1,000 Units, Oral, Daily, Geoffrey Potts DO, 1,000 Units at 06/07/21 0910  •  clopidogrel (PLAVIX) tablet 75 mg, 75 mg, Oral, Daily, Nico Raines MD, 75 mg at 06/07/21 0909  •  dextrose (D50W) 25 g/ 50mL Intravenous Solution 25 g, 25 g, Intravenous, Q15 Min PRN, Marlene Rizvi, DENNY  •  dextrose (GLUTOSE) oral gel 15 g, 15 g, Oral, Q15 Min PRN, Marlene Rizvi APRN  •  dilTIAZem CD (CARDIZEM CD) 24 hr capsule 300 mg, 300 mg, Oral, Q24H, Mariangel West APRN, 300 mg at 06/07/21 0909  •  enoxaparin (LOVENOX) syringe 30 mg, 30 mg, Subcutaneous, Q24H, Geoffrey Potts DO, 30 mg at 06/06/21 1531  •  glucagon (human recombinant) (GLUCAGEN DIAGNOSTIC) injection 1 mg, 1 mg, Subcutaneous, PRN, Marlene Rizvi APRN  •  hydrALAZINE (APRESOLINE) tablet 100 mg, 100 mg, Oral, Q8H, Geoffrey Potts, DO, 100 mg at 06/07/21 0520  •  insulin glargine (LANTUS, SEMGLEE) injection 10 Units, 10 Units, Subcutaneous, Nightly, Kelsi Wilks, APRN, 10 Units at 06/06/21 2224  •  insulin lispro (ADMELOG) injection 0-14 Units, 0-14 Units, Subcutaneous, TID AC, Marlene Rizvi, APRN, 3 Units at 06/06/21 1150  •  ipratropium-albuterol (DUO-NEB) nebulizer solution 3 mL, 3 mL, Nebulization, Q4H PRN, Disha Silva, APRN, 3 mL at 06/03/21 0707  •  isosorbide  mononitrate (IMDUR) 24 hr tablet 120 mg, 120 mg, Oral, Daily, Kelsi Wilks APRN, 120 mg at 06/07/21 0910  •  labetalol (NORMODYNE,TRANDATE) injection 10 mg, 10 mg, Intravenous, Q4H PRN, Mariangel West APRN, 10 mg at 06/03/21 1209  •  metoprolol succinate XL (TOPROL-XL) 24 hr tablet 150 mg, 150 mg, Oral, Nightly, Kelsi Wilks APRN, 150 mg at 06/06/21 2010  •  nitroglycerin (NITROSTAT) SL tablet 0.4 mg, 0.4 mg, Sublingual, Q5 Min PRN, Marlene Rizvi APRN  •  ondansetron (ZOFRAN) injection 4 mg, 4 mg, Intravenous, Q6H PRN, Marlene Rizvi APRN  •  [COMPLETED] Insert peripheral IV, , , Once **AND** sodium chloride 0.9 % flush 10 mL, 10 mL, Intravenous, PRN, Dereje Byrne MD  •  sodium chloride 0.9 % flush 10 mL, 10 mL, Intravenous, Q12H, Marlene Rizvi APRN, 10 mL at 06/06/21 0818  •  sodium chloride 0.9 % flush 10 mL, 10 mL, Intravenous, PRN, Marlene Rizvi APRN  •  sodium chloride 0.9 % flush 10 mL, 10 mL, Intravenous, Q12H, Nico Raines MD, 10 mL at 06/07/21 0911  •  sodium chloride 0.9 % flush 10 mL, 10 mL, Intravenous, PRN, Nico Raines MD  •  spironolactone (ALDACTONE) tablet 25 mg, 25 mg, Oral, Daily, Mariangel West APRN, Stopped at 06/07/21 0900  •  torsemide (DEMADEX) tablet 20 mg, 20 mg, Oral, Daily, Mariangel West APRN, Stopped at 06/07/21 0900      Assessment/Plan     Non olig COLLIN - prerenal azotemia picture initially related to over-diuresis with initial Cr 1.7, improved to ~ 1 with IVF and diuretic cessation, which was atypically low, and understandably drifting upward with diuretic resumption   Creatinine is  Slightly up today to 1.58. Baseline between 1.2-1.5    CKD stage 3 - multifactorial: DM2, HTN, cardiorenal syn; BL Cr roughly 1.3 - 1.5 lately   Vol overload - vol status tenuous, gather initial vol depletion from diuresis, then became overloaded; torsemide resumed, aldactone added; edema better, no dyspnea; wt's unreliable  -- torsemide  reduced 20mg daily today per cardiology  HTN - uncontrolled; though don't need tight control at her age still up to 180 systolic  -- no e/o SRAVANI by doppler; on max hydralazine and hefty doses cardizem & toprol XL, HR 60s - 70s  -- ARB was stopped recent admission,  DM2, on insulin   PCM, severe, 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   Kandi Delgado MD  06/07/21  10:31 EDT

## 2021-06-07 NOTE — PLAN OF CARE
Goal Outcome Evaluation:  Plan of Care Reviewed With: patient  Progress: improving  Outcome Summary: VSS - BP much improved. 5am reading highest at 170/65 prior to 6am Hydralazine dose.  Pt drowsy at beginning of shift but perked up after assessment. Per cardiology note hold diuretics until they round - held on MAR for oncoming shift. Patient family hopeful for pt discharge soon.  Will continue to monitor.

## 2021-06-08 ENCOUNTER — NURSE TRIAGE (OUTPATIENT)
Dept: CALL CENTER | Facility: HOSPITAL | Age: 86
End: 2021-06-08

## 2021-06-08 VITALS
SYSTOLIC BLOOD PRESSURE: 144 MMHG | DIASTOLIC BLOOD PRESSURE: 54 MMHG | BODY MASS INDEX: 37.38 KG/M2 | HEIGHT: 61 IN | TEMPERATURE: 97.8 F | RESPIRATION RATE: 18 BRPM | WEIGHT: 198 LBS | OXYGEN SATURATION: 100 % | HEART RATE: 77 BPM

## 2021-06-08 PROBLEM — N17.9 AKI (ACUTE KIDNEY INJURY) (HCC): Status: RESOLVED | Noted: 2021-05-26 | Resolved: 2021-06-08

## 2021-06-08 PROBLEM — E87.5 HYPERKALEMIA: Status: RESOLVED | Noted: 2021-05-31 | Resolved: 2021-06-08

## 2021-06-08 PROBLEM — I95.9 HYPOTENSION: Status: RESOLVED | Noted: 2021-05-31 | Resolved: 2021-06-08

## 2021-06-08 LAB
ALBUMIN SERPL-MCNC: 3.2 G/DL (ref 3.5–5.2)
ANION GAP SERPL CALCULATED.3IONS-SCNC: 7.6 MMOL/L (ref 5–15)
BUN SERPL-MCNC: 47 MG/DL (ref 8–23)
BUN/CREAT SERPL: 29.7 (ref 7–25)
CALCIUM SPEC-SCNC: 9 MG/DL (ref 8.2–9.6)
CHLORIDE SERPL-SCNC: 102 MMOL/L (ref 98–107)
CO2 SERPL-SCNC: 28.4 MMOL/L (ref 22–29)
CREAT SERPL-MCNC: 1.58 MG/DL (ref 0.57–1)
DEPRECATED RDW RBC AUTO: 43.8 FL (ref 37–54)
ERYTHROCYTE [DISTWIDTH] IN BLOOD BY AUTOMATED COUNT: 13.1 % (ref 12.3–15.4)
GFR SERPL CREATININE-BSD FRML MDRD: 37 ML/MIN/1.73
GLUCOSE BLDC GLUCOMTR-MCNC: 171 MG/DL (ref 70–130)
GLUCOSE BLDC GLUCOMTR-MCNC: 199 MG/DL (ref 70–130)
GLUCOSE BLDC GLUCOMTR-MCNC: 210 MG/DL (ref 70–130)
GLUCOSE SERPL-MCNC: 171 MG/DL (ref 65–99)
HCT VFR BLD AUTO: 31.3 % (ref 34–46.6)
HGB BLD-MCNC: 10.4 G/DL (ref 12–15.9)
MCH RBC QN AUTO: 30.7 PG (ref 26.6–33)
MCHC RBC AUTO-ENTMCNC: 33.2 G/DL (ref 31.5–35.7)
MCV RBC AUTO: 92.3 FL (ref 79–97)
PHOSPHATE SERPL-MCNC: 3 MG/DL (ref 2.5–4.5)
PLATELET # BLD AUTO: 178 10*3/MM3 (ref 140–450)
PMV BLD AUTO: 10.2 FL (ref 6–12)
POTASSIUM SERPL-SCNC: 4.3 MMOL/L (ref 3.5–5.2)
RBC # BLD AUTO: 3.39 10*6/MM3 (ref 3.77–5.28)
SODIUM SERPL-SCNC: 138 MMOL/L (ref 136–145)
WBC # BLD AUTO: 5.51 10*3/MM3 (ref 3.4–10.8)

## 2021-06-08 PROCEDURE — 97530 THERAPEUTIC ACTIVITIES: CPT

## 2021-06-08 PROCEDURE — 99231 SBSQ HOSP IP/OBS SF/LOW 25: CPT | Performed by: NURSE PRACTITIONER

## 2021-06-08 PROCEDURE — 85027 COMPLETE CBC AUTOMATED: CPT | Performed by: STUDENT IN AN ORGANIZED HEALTH CARE EDUCATION/TRAINING PROGRAM

## 2021-06-08 PROCEDURE — 25010000002 ENOXAPARIN PER 10 MG: Performed by: INTERNAL MEDICINE

## 2021-06-08 PROCEDURE — 63710000001 INSULIN LISPRO (HUMAN) PER 5 UNITS: Performed by: NURSE PRACTITIONER

## 2021-06-08 PROCEDURE — 82962 GLUCOSE BLOOD TEST: CPT

## 2021-06-08 PROCEDURE — 80069 RENAL FUNCTION PANEL: CPT | Performed by: HOSPITALIST

## 2021-06-08 PROCEDURE — 97110 THERAPEUTIC EXERCISES: CPT

## 2021-06-08 RX ORDER — TORSEMIDE 20 MG/1
20 TABLET ORAL DAILY
Qty: 30 TABLET | Refills: 0 | Status: SHIPPED | OUTPATIENT
Start: 2021-06-09 | End: 2021-06-21 | Stop reason: SDUPTHER

## 2021-06-08 RX ORDER — METOPROLOL SUCCINATE 50 MG/1
150 TABLET, EXTENDED RELEASE ORAL NIGHTLY
Qty: 90 TABLET | Refills: 0 | Status: SHIPPED | OUTPATIENT
Start: 2021-06-08 | End: 2021-06-30 | Stop reason: SDUPTHER

## 2021-06-08 RX ORDER — ASPIRIN 81 MG/1
81 TABLET ORAL DAILY
Qty: 16 TABLET | Refills: 0 | Status: SHIPPED | OUTPATIENT
Start: 2021-06-09 | End: 2021-06-21

## 2021-06-08 RX ORDER — MELATONIN
1000 DAILY
Qty: 30 TABLET | Refills: 0 | Status: SHIPPED | OUTPATIENT
Start: 2021-06-09 | End: 2021-07-09

## 2021-06-08 RX ORDER — ASPIRIN 325 MG
325 TABLET, DELAYED RELEASE (ENTERIC COATED) ORAL DAILY
Qty: 30 TABLET | Refills: 0 | Status: SHIPPED | OUTPATIENT
Start: 2021-06-26 | End: 2021-07-26

## 2021-06-08 RX ORDER — CLONIDINE HYDROCHLORIDE 0.1 MG/1
0.1 TABLET ORAL EVERY 12 HOURS SCHEDULED
Qty: 60 TABLET | Refills: 0 | Status: SHIPPED | OUTPATIENT
Start: 2021-06-08 | End: 2021-07-08 | Stop reason: SDUPTHER

## 2021-06-08 RX ORDER — DILTIAZEM HYDROCHLORIDE 300 MG/1
300 CAPSULE, COATED, EXTENDED RELEASE ORAL
Qty: 30 CAPSULE | Refills: 0 | Status: SHIPPED | OUTPATIENT
Start: 2021-06-09 | End: 2021-07-08 | Stop reason: SDUPTHER

## 2021-06-08 RX ORDER — CLOPIDOGREL BISULFATE 75 MG/1
75 TABLET ORAL DAILY
Qty: 16 TABLET | Refills: 0 | Status: SHIPPED | OUTPATIENT
Start: 2021-06-09 | End: 2021-06-25

## 2021-06-08 RX ORDER — ATORVASTATIN CALCIUM 40 MG/1
40 TABLET, FILM COATED ORAL NIGHTLY
Qty: 30 TABLET | Refills: 0 | Status: SHIPPED | OUTPATIENT
Start: 2021-06-08 | End: 2021-06-30 | Stop reason: SDUPTHER

## 2021-06-08 RX ORDER — HYDRALAZINE HYDROCHLORIDE 100 MG/1
100 TABLET, FILM COATED ORAL 3 TIMES DAILY
Qty: 90 TABLET | Refills: 0 | Status: SHIPPED | OUTPATIENT
Start: 2021-06-08 | End: 2021-07-22 | Stop reason: SDUPTHER

## 2021-06-08 RX ADMIN — INSULIN LISPRO 3 UNITS: 100 INJECTION, SOLUTION INTRAVENOUS; SUBCUTANEOUS at 11:49

## 2021-06-08 RX ADMIN — INSULIN LISPRO 3 UNITS: 100 INJECTION, SOLUTION INTRAVENOUS; SUBCUTANEOUS at 08:32

## 2021-06-08 RX ADMIN — TORSEMIDE 20 MG: 20 TABLET ORAL at 08:35

## 2021-06-08 RX ADMIN — SODIUM CHLORIDE, PRESERVATIVE FREE 10 ML: 5 INJECTION INTRAVENOUS at 08:33

## 2021-06-08 RX ADMIN — SODIUM CHLORIDE, PRESERVATIVE FREE 10 ML: 5 INJECTION INTRAVENOUS at 08:34

## 2021-06-08 RX ADMIN — ASPIRIN 81 MG: 81 TABLET, COATED ORAL at 08:33

## 2021-06-08 RX ADMIN — ISOSORBIDE MONONITRATE 120 MG: 60 TABLET ORAL at 08:33

## 2021-06-08 RX ADMIN — CLOPIDOGREL 75 MG: 75 TABLET, FILM COATED ORAL at 08:34

## 2021-06-08 RX ADMIN — Medication 1000 UNITS: at 08:34

## 2021-06-08 RX ADMIN — CLONIDINE HYDROCHLORIDE 0.1 MG: 0.1 TABLET ORAL at 08:35

## 2021-06-08 RX ADMIN — HYDRALAZINE HYDROCHLORIDE 100 MG: 50 TABLET, FILM COATED ORAL at 15:29

## 2021-06-08 RX ADMIN — DILTIAZEM HYDROCHLORIDE 300 MG: 180 CAPSULE, COATED, EXTENDED RELEASE ORAL at 08:35

## 2021-06-08 RX ADMIN — BUPROPION HYDROCHLORIDE 75 MG: 75 TABLET, FILM COATED ORAL at 08:35

## 2021-06-08 RX ADMIN — INSULIN LISPRO 5 UNITS: 100 INJECTION, SOLUTION INTRAVENOUS; SUBCUTANEOUS at 17:28

## 2021-06-08 RX ADMIN — ENOXAPARIN SODIUM 30 MG: 100 INJECTION SUBCUTANEOUS at 15:31

## 2021-06-08 RX ADMIN — HYDRALAZINE HYDROCHLORIDE 100 MG: 50 TABLET, FILM COATED ORAL at 05:28

## 2021-06-08 NOTE — PROGRESS NOTES
"   LOS: 8 days    Patient Care Team:  Мария Wilks MD as PCP - General (Internal Medicine)  Мария Wilks MD as PCP - Internal Medicine    Chief Complaint:    Chief Complaint   Patient presents with   • Altered Mental Status     Follow UP Melvina on CKD 3  Subjective     Interval History:     Crying when I entered the room.  I asked what was wrong.  She tells me she is just \"happy to be with the Lord. \"  Very angry that I am asking her questions.  Says she is dying today.  Will not answer when asked what makes her think this.  Adamantly refuses to allow me to examine her .  Urine not measured. Weight up, but not consistent.  Sats 98% on RA.   Review of Systems:   As noted above    Objective     Vital Signs  Temp:  [97.6 °F (36.4 °C)-98.9 °F (37.2 °C)] 97.9 °F (36.6 °C)  Heart Rate:  [66-80] 72  Resp:  [18] 18  BP: (126-152)/(52-78) 152/60    Flowsheet Rows      First Filed Value   Admission Height  154.9 cm (61\") Documented at 05/30/2021 2110   Admission Weight  83.8 kg (184 lb 11.2 oz) Documented at 05/30/2021 2110          No intake/output data recorded.  I/O last 3 completed shifts:  In: 180 [P.O.:180]  Out: 400 [Urine:400]    Intake/Output Summary (Last 24 hours) at 6/8/2021 0913  Last data filed at 6/8/2021 0400  Gross per 24 hour   Intake 0 ml   Output --   Net 0 ml       Physical Exam:  Patient will not allow exam.      Results Review:    Results from last 7 days   Lab Units 06/08/21  0615 06/07/21  0553 06/06/21  0432   SODIUM mmol/L 138 141 140   POTASSIUM mmol/L 4.3 3.9 4.2   CHLORIDE mmol/L 102 101 102   CO2 mmol/L 28.4 28.8 29.0   BUN mg/dL 47* 39* 37*   CREATININE mg/dL 1.58* 1.58* 1.46*   CALCIUM mg/dL 9.0 9.2 9.1   GLUCOSE mg/dL 171* 79 98       Estimated Creatinine Clearance: 23.7 mL/min (A) (by C-G formula based on SCr of 1.58 mg/dL (H)).    Results from last 7 days   Lab Units 06/08/21  0615 06/07/21  0553 06/06/21  0432 06/04/21  0549   MAGNESIUM mg/dL  --  1.9 1.9 1.8   PHOSPHORUS mg/dL 3.0 4.0 " 3.7 3.4             Results from last 7 days   Lab Units 06/08/21  0615 06/07/21  0553 06/06/21  0432 06/04/21  0549 06/02/21  0450   WBC 10*3/mm3 5.51 5.43 4.68 4.81 4.44   HEMOGLOBIN g/dL 10.4* 10.9* 10.1* 10.3* 10.1*   PLATELETS 10*3/mm3 178 185 186 182 160               Imaging Results (Last 24 Hours)     ** No results found for the last 24 hours. **        aspirin, 81 mg, Oral, Daily  atorvastatin, 40 mg, Oral, Nightly  buPROPion, 75 mg, Oral, Q12H  cholecalciferol, 1,000 Units, Oral, Daily  cloNIDine, 0.1 mg, Oral, Q12H  clopidogrel, 75 mg, Oral, Daily  dilTIAZem CD, 300 mg, Oral, Q24H  enoxaparin, 30 mg, Subcutaneous, Q24H  hydrALAZINE, 100 mg, Oral, Q8H  insulin glargine, 10 Units, Subcutaneous, Nightly  insulin lispro, 0-14 Units, Subcutaneous, TID AC  isosorbide mononitrate, 120 mg, Oral, Daily  metoprolol succinate XL, 150 mg, Oral, Nightly  sodium chloride, 10 mL, Intravenous, Q12H  sodium chloride, 10 mL, Intravenous, Q12H  spironolactone, 25 mg, Oral, Daily  torsemide, 20 mg, Oral, Daily           Medication Review:   Current Facility-Administered Medications   Medication Dose Route Frequency Provider Last Rate Last Admin   • acetaminophen (TYLENOL) tablet 650 mg  650 mg Oral Q4H PRN Marlene Rizvi APRN   650 mg at 06/05/21 2125    Or   • acetaminophen (TYLENOL) 160 MG/5ML solution 650 mg  650 mg Oral Q4H PRN Marlene Rizvi APRN        Or   • acetaminophen (TYLENOL) suppository 650 mg  650 mg Rectal Q4H PRN Marlene Rizvi APRN       • aspirin EC tablet 81 mg  81 mg Oral Daily Chauncey Dyer MD   81 mg at 06/08/21 0833   • atorvastatin (LIPITOR) tablet 40 mg  40 mg Oral Nightly Chauncey Dyer MD   40 mg at 06/07/21 2301   • buPROPion (WELLBUTRIN) tablet 75 mg  75 mg Oral Q12H Kelsi Wilks APRN   75 mg at 06/08/21 0835   • cholecalciferol (VITAMIN D3) tablet 1,000 Units  1,000 Units Oral Daily Geoffrey Potts DO   1,000 Units at 06/08/21 0834   • cloNIDine (CATAPRES) tablet 0.1 mg   0.1 mg Oral Q12H Kandi Delgado MD   0.1 mg at 06/08/21 0835   • clopidogrel (PLAVIX) tablet 75 mg  75 mg Oral Daily Nico Raines MD   75 mg at 06/08/21 0834   • dextrose (D50W) 25 g/ 50mL Intravenous Solution 25 g  25 g Intravenous Q15 Min PRN Marlene Rizvi APRN       • dextrose (GLUTOSE) oral gel 15 g  15 g Oral Q15 Min PRN Marlene Rizvi APRN       • dilTIAZem CD (CARDIZEM CD) 24 hr capsule 300 mg  300 mg Oral Q24H Mariangel West APRN   300 mg at 06/08/21 0835   • enoxaparin (LOVENOX) syringe 30 mg  30 mg Subcutaneous Q24H Geoffrey Potts, DO   30 mg at 06/07/21 1544   • glucagon (human recombinant) (GLUCAGEN DIAGNOSTIC) injection 1 mg  1 mg Subcutaneous PRN Marlene Rizvi APRN       • hydrALAZINE (APRESOLINE) tablet 100 mg  100 mg Oral Q8H Geoffrey Potts, DO   100 mg at 06/08/21 0528   • insulin glargine (LANTUS, SEMGLEE) injection 10 Units  10 Units Subcutaneous Nightly Kelsi Wilks APRN   10 Units at 06/07/21 2300   • insulin lispro (ADMELOG) injection 0-14 Units  0-14 Units Subcutaneous TID AC Marlene Rizvi APRN   3 Units at 06/08/21 0832   • ipratropium-albuterol (DUO-NEB) nebulizer solution 3 mL  3 mL Nebulization Q4H PRN Disha Silva APRN   3 mL at 06/03/21 0707   • isosorbide mononitrate (IMDUR) 24 hr tablet 120 mg  120 mg Oral Daily Kelsi Wilks APRN   120 mg at 06/08/21 0833   • labetalol (NORMODYNE,TRANDATE) injection 10 mg  10 mg Intravenous Q4H PRN Mariangel West APRN   10 mg at 06/03/21 1209   • metoprolol succinate XL (TOPROL-XL) 24 hr tablet 150 mg  150 mg Oral Nightly Kelsi Wilks APRN   150 mg at 06/07/21 2301   • nitroglycerin (NITROSTAT) SL tablet 0.4 mg  0.4 mg Sublingual Q5 Min PRN Marlene Rizvi APRN       • ondansetron (ZOFRAN) injection 4 mg  4 mg Intravenous Q6H PRN Marlene Rizvi APRN       • sodium chloride 0.9 % flush 10 mL  10 mL Intravenous PRN Dereje Byrne MD       • sodium chloride 0.9 % flush  10 mL  10 mL Intravenous Q12H Marlene Rizvi APRN   10 mL at 06/08/21 0834   • sodium chloride 0.9 % flush 10 mL  10 mL Intravenous PRN Marlene Rizvi APRN       • sodium chloride 0.9 % flush 10 mL  10 mL Intravenous Q12H Nico Raines MD   10 mL at 06/08/21 0833   • sodium chloride 0.9 % flush 10 mL  10 mL Intravenous PRN Nico Raines MD       • spironolactone (ALDACTONE) tablet 25 mg  25 mg Oral Daily Mariangel West APRN   Stopped at 06/07/21 0900   • torsemide (DEMADEX) tablet 20 mg  20 mg Oral Daily Mariangel West APRN   20 mg at 06/08/21 0835       Assessment/Plan   1. COLLIN on CKD3. ( IV contrast 6/4 and hypotension)Creatinine stable today at 1.58.  K 4.3. I recommend stopping aldactone as she did have K 5.6 5/30/21 and renal function with GFR 37 cc per min.  I think that the risk outweighs the benefit.   2. Chronic diastolic heart failure.  Her weight is up, but not consistent with prior days.  She will not allow me to examine her .  3. DM2   -248.   4. PRABHU  5. HTN.  Clonidine added yesterday. Requiring multiple meds to control. Watch pulse closely on combination of clonidine and beta blocker .    JULIETA RN.  Since this is my first encounter with patient, RN Confirms that this has been patient pattern of behavior.  Not a new alteration in mental status.     Jordana Cowan MD  06/08/21  09:13 EDT

## 2021-06-08 NOTE — PLAN OF CARE
Problem: Adult Inpatient Plan of Care  Goal: Plan of Care Review  Outcome: Ongoing, Progressing  Goal: Patient-Specific Goal (Individualized)  Outcome: Ongoing, Progressing  Goal: Absence of Hospital-Acquired Illness or Injury  Outcome: Ongoing, Progressing  Intervention: Identify and Manage Fall Risk  Recent Flowsheet Documentation  Taken 6/7/2021 2000 by Didi Martinez RN  Safety Promotion/Fall Prevention:   activity supervised   clutter free environment maintained   assistive device/personal items within reach   fall prevention program maintained   nonskid shoes/slippers when out of bed   room organization consistent   safety round/check completed  Intervention: Prevent Skin Injury  Recent Flowsheet Documentation  Taken 6/7/2021 2000 by Didi Martinez RN  Body Position: supine  Intervention: Prevent and Manage VTE (venous thromboembolism) Risk  Recent Flowsheet Documentation  Taken 6/7/2021 2000 by Didi Martinez RN  VTE Prevention/Management:   bilateral   dorsiflexion/plantar flexion performed  Intervention: Prevent Infection  Recent Flowsheet Documentation  Taken 6/7/2021 2000 by Didi Martinez RN  Infection Prevention:   rest/sleep promoted   single patient room provided  Goal: Optimal Comfort and Wellbeing  Outcome: Ongoing, Progressing  Intervention: Provide Person-Centered Care  Recent Flowsheet Documentation  Taken 6/7/2021 2000 by Didi Martinez RN  Trust Relationship/Rapport:   care explained   choices provided   emotional support provided   questions answered   questions encouraged  Goal: Readiness for Transition of Care  Outcome: Ongoing, Progressing     Problem: Fall Injury Risk  Goal: Absence of Fall and Fall-Related Injury  Outcome: Ongoing, Progressing  Intervention: Identify and Manage Contributors to Fall Injury Risk  Recent Flowsheet Documentation  Taken 6/7/2021 2000 by Didi Martinez RN  Medication Review/Management: medications reviewed  Intervention: Promote Injury-Free  Environment  Recent Flowsheet Documentation  Taken 6/7/2021 2000 by Didi Martinez, RN  Safety Promotion/Fall Prevention:   activity supervised   clutter free environment maintained   assistive device/personal items within reach   fall prevention program maintained   nonskid shoes/slippers when out of bed   room organization consistent   safety round/check completed     Problem: Skin Injury Risk Increased  Goal: Skin Health and Integrity  Outcome: Ongoing, Progressing  Intervention: Optimize Skin Protection  Recent Flowsheet Documentation  Taken 6/7/2021 2000 by Didi Martinez, RN  Head of Bed (HOB): HOB elevated   Goal Outcome Evaluation:    VSS. A&Ox4. Pt has had multiple loose stools today. Notified [facility]. NNO at this time, wait and see what labs look like in AM. Possibly home tomorrow pending nephrology input. Will continue to monitor.

## 2021-06-08 NOTE — THERAPY TREATMENT NOTE
Patient Name: Denny Ivy  : 8/3/1929    MRN: [id number]                              Today's Date: 2021       Admit Date: 2021    Visit Dx:     ICD-10-CM ICD-9-CM   1. Generalized weakness  R53.1 780.79   2. Acute renal failure, unspecified acute renal failure type (CMS/[facility])  N17.9 584.9   3. Hyperkalemia  E87.5 276.7     Patient Active Problem List   Diagnosis   • PRABHU treated with autoBiPAP   • Hypersomnia due to medical condition   • Chronic congestive heart failure (CMS/[facility])   • HTN (hypertension)   • Type 2 diabetes mellitus, with long-term current use of insulin (CMS/[facility])   • CKD (chronic kidney disease) stage 3, GFR 30-59 ml/min (CMS/[facility])   • COLLIN (acute kidney injury) (CMS/[facility])   • Generalized weakness   • Hypotension   • Weight loss   • Anemia   • Hyperkalemia   • Chronic diastolic CHF (congestive heart failure) (CMS/[facility])     Past Medical History:   Diagnosis Date   • Arthritis    • Diabetes 1.5, managed as type 2 (CMS/[facility])    • Edema    • Hypertension    • Hypothyroidism    • Low back pain    • Macular degeneration      Past Surgical History:   Procedure Laterality Date   • CARDIAC SURGERY     • CHOLECYSTECTOMY     • HIATAL HERNIA REPAIR     • HYSTERECTOMY       General Information     [facility] Name 21 1325          Physical Therapy Time and Intention    Document Type  therapy note (daily note)  -     Mode of Treatment  individual therapy;physical therapy  -     Row Name 21 1325          General Information    Patient Profile Reviewed  yes  -     Existing Precautions/Restrictions  fall  -     Row Name 21 1325          Cognition    Orientation Status (Cognition)  oriented to;person;place  -     Row Name 21 1325          Safety Issues, Functional Mobility    Safety Issues Affecting Function (Mobility)  judgment;positioning of assistive device;problem-solving;safety precaution awareness  -     Impairments Affecting Function (Mobility)   balance;coordination;endurance/activity tolerance;postural/trunk control;strength  -       User Key  (r) = Recorded By, (t) = Taken By, (c) = Cosigned By    Initials Name Provider Type    Disha Vivar PTA Physical Therapy Assistant        Mobility     Row Name 06/08/21 1326          Bed Mobility    Supine-Sit [place] (Bed Mobility)  moderate assist (50% patient effort);2 person assist  -     Sit-Supine [place] (Bed Mobility)  not tested  -     Assistive Device (Bed Mobility)  bed rails;draw sheet;head of bed elevated  -     Row Name 06/08/21 1326          Sit-Stand Transfer    Sit-Stand [place] (Transfers)  2 person assist;minimum assist (75% patient effort);verbal cues;nonverbal cues (demo/gesture)  -     Assistive Device (Sit-Stand Transfers)  walker, front-wheeled  -     Row Name 06/08/21 1326          Gait/Stairs (Locomotion)    [place] Level (Gait)  minimum assist (75% patient effort);verbal cues;nonverbal cues (demo/gesture)  -     Assistive Device (Gait)  walker, front-wheeled  -     Distance in Feet (Gait)  35ft, 2 standing rests, fatigued but denies pain, LLE sensitive to touch -did not give pn #  -     Deviations/Abnormal Patterns (Gait)  lenny decreased;festinating/shuffling;base of support, wide;weight shifting decreased;stride length decreased  -     Bilateral Gait Deviations  forward flexed posture  -     Comment (Gait/Stairs)  pt leans over rwx , leans on forearms most of amb, has been doing this for yrs per pt-educ offered  -       User Key  (r) = Recorded By, (t) = Taken By, (c) = Cosigned By    Initials Name Provider Type    Disha Vivar PTA Physical Therapy Assistant        Obj/Interventions    No documentation.       Goals/Plan    No documentation.       Clinical Impression     Row Name 06/08/21 1329          Pain Scale: Numbers Pre/Post-Treatment    Pretreatment Pain Rating  0/10 - no pain  -     Pre/Posttreatment Pain Comment  but  clearly has pain LLE when assisted to move it  -     Pain Intervention(s)  Repositioned  -     Row Name 06/08/21 1329          Plan of Care Review    Plan of Care Reviewed With  patient;caregiver  -     Progress  improving  -     Outcome Summary  Pt agreed to get OOB, but wasn't initially sure how far she could tolerate amb; pt got to doorway and took a few more steps before turning around, last 10ft of amb was slower paced and incr leaning over rwx-offered chair to rest and pt declined; plans HOME at [place] as she has 24/7 caregivers  -     Row Name 06/08/21 1329          Therapy Assessment/Plan (PT)    Rehab Potential (PT)  good, to achieve stated therapy goals  -     Criteria for Skilled Interventions Met (PT)  yes  -     Row Name 06/08/21 1329          Vital Signs    O2 Delivery Pre Treatment  room air  -     Row Name 06/08/21 1329          Positioning and Restraints    Pre-Treatment Position  in bed  -     Post Treatment Position  chair  -     In Chair  reclined;call light within reach;encouraged to call for assist;exit alarm on;notified nsg;with family/caregiver  -       User Key  (r) = Recorded By, (t) = Taken By, (c) = Cosigned By    Initials Name Provider Type    Disha Vivar PTA Physical Therapy Assistant        Outcome Measures     Row Name 06/08/21 1333          How much help from another person do you currently need...    Turning from your back to your side while in flat bed without using bedrails?  2  -JM     Moving from lying on back to sitting on the side of a flat bed without bedrails?  2  -JM     Moving to and from a bed to a chair (including a wheelchair)?  3  -JM     Standing up from a chair using your arms (e.g., wheelchair, bedside chair)?  3  -JM     Climbing 3-5 steps with a railing?  1  -JM     To walk in hospital room?  2  -JM     AM-PAC 6 Clicks Score (PT)  13  -       User Key  (r) = Recorded By, (t) = Taken By, (c) = Cosigned By    Initials Name Provider Type     Disha Vivar PTA Physical Therapy Assistant        Physical Therapy Education                 Title: PT OT SLP Therapies (Done)     Topic: Physical Therapy (Done)     Point: Mobility training (Done)     Learning Progress Summary           Patient Acceptance, E,TB,D, VU,NR by ANDREW at 6/8/2021 1334    Acceptance, E,D, VU,NR by ZARIA at 6/7/2021 1706    Acceptance, E,TB,D, VU,NR by RA at 6/6/2021 1502    Acceptance, E,D, DU,NR by PC at 6/1/2021 1500    Acceptance, E, VU by  at 5/31/2021 1444   Caregiver Acceptance, E,TB,D, VU,NR by ANDREW at 6/8/2021 1334                   Point: Home exercise program (Done)     Learning Progress Summary           Patient Acceptance, E,TB,D, VU,NR by ANDREW at 6/8/2021 1334    Acceptance, E,D, VU,NR by ZARIA at 6/7/2021 1706    Acceptance, E,TB,D, VU,NR by FOREST at 6/6/2021 1502    Acceptance, E, VU by  at 5/31/2021 1444   Caregiver Acceptance, E,TB,D, VU,NR by ANDREW at 6/8/2021 1334                   Point: Body mechanics (Done)     Learning Progress Summary           Patient Acceptance, E,TB,D, VU,NR by ANDREW at 6/8/2021 1334    Acceptance, E,D, VU,NR by ZARIA at 6/7/2021 1706    Acceptance, E,TB,D, VU,NR by RA at 6/6/2021 1502    Acceptance, E,D, DU,NR by PC at 6/1/2021 1500    Acceptance, E, VU by  at 5/31/2021 1444   Caregiver Acceptance, E,TB,D, VU,NR by ANDREW at 6/8/2021 1334                   Point: Precautions (Done)     Learning Progress Summary           Patient Acceptance, E,TB,D, VU,NR by ANDREW at 6/8/2021 1334    Acceptance, E,D, VU,NR by ZARIA at 6/7/2021 1706    Acceptance, E,TB,D, VU,NR by RA at 6/6/2021 1502    Acceptance, E,D, DU,NR by PC at 6/1/2021 1500    Acceptance, E, VU by  at 5/31/2021 1444   Caregiver Acceptance, E,TB,D, VU,NR by  at 6/8/2021 1334                               User Key     Initials Effective Dates Name Provider Type Discipline    RA 04/06/17 -  Zenaida Valladares, PT Physical Therapist PT    PC 04/03/18 -  Anum Zazueta, PT Physical Therapist PT      03/07/18 -  Disha Edwards PTA Physical Therapy Assistant PT    EB 03/10/21 -  Radha Garcia PTA Physical Therapy Assistant PT    CH 04/03/18 -  Semaj España PT Physical Therapist PT              PT Recommendation and Plan     Plan of Care Reviewed With: patient, caregiver  Progress: improving  Outcome Summary: Pt agreed to get OOB, but wasn't initially sure how far she could tolerate amb; pt got to doorway and took a few more steps before turning around, last 10ft of amb was slower paced and incr leaning over rwx-offered chair to rest and pt declined; plans HOME at [place] as she has 24/7 caregivers     Time Calculation:   PT Charges     Row Name 06/08/21 1212             Time Calculation    Start Time  0945  -      Stop Time  1008  -      Time Calculation (min)  23 min  -ANDREW      PT Received On  06/08/21  -ANDREW      PT - Next Appointment  06/09/21  -ANDREW        User Key  (r) = Recorded By, (t) = Taken By, (c) = Cosigned By    Initials Name Provider Type    Disha Vivar PTA Physical Therapy Assistant        Therapy Charges for Today     Code Description Service Date Service Provider Modifiers Qty    78460874581 HC PT THER PROC EA 15 MIN 6/8/2021 Disha Edwards PTA GP 2    02082181574 HC PT THER SUPP EA 15 MIN 6/8/2021 Disha Edwards PTA GP 1          PT G-Codes  Outcome Measure Options: AM-PAC 6 Clicks Basic Mobility (PT)  AM-PAC 6 Clicks Score (PT): 13  AM-PAC 6 Clicks Score (OT): 13  Modified [place] Scale: 4 - Moderately severe disability.  Unable to walk without assistance, and unable to attend to own bodily needs without assistance.    Disha Edwards PTA  6/8/2021

## 2021-06-08 NOTE — PLAN OF CARE
Goal Outcome Evaluation:  Plan of Care Reviewed With: patient           Outcome Summary: vss, nephrology saw today torsemide given, aldactone was on a continued hold  today as per nephrology.  A/o x 4.  Pt continues with lethergy.  Pt is very sleepy.  Pt reported right lower abd pain that resovled with large bm today.  Pt reqires help with eating.  Ast x2.  Pt did work with pt today.  Cardiology feels that pt is stable enough to discharge.  Family was kept informed of orders and plan of care through out the day.  Pt will most likely discharge home with family tomorrow as pt has round the clock caregivers.

## 2021-06-08 NOTE — CASE MANAGEMENT/SOCIAL WORK
Continued Stay Note  [facility]     Patient Name: Denny Ivy  MRN: [id number]  Today's Date: 6/8/2021    Admit Date: 5/30/2021    Discharge Plan     Row Name 06/08/21 1332       Plan    Plan  Home with [facility] and caregivers    Patient/Family in Agreement with Plan  yes    Plan Comments  Spoke with pt's granddaughter/Smitha who confirms plan for pt to return home at discharge with [facility] and caregivers.  Granddaughter states that pt's spouse recenlty passed and was under \[facility]\"" care.  Family would like for pt to utilize grief counseling offered by \[facility]\"".  Voicemail left for grief counseling dept of \[facility]\"" to reach out to [facility].  No further needs identified.  CCP continues to follow.  SAAD Schmidt RN        Discharge Codes    No documentation.       Expected Discharge Date and Time     Expected Discharge Date Expected Discharge Time    Nick 3, 2021             Ciara Schmidt, RN

## 2021-06-08 NOTE — THERAPY TREATMENT NOTE
Patient Name: Denny Ivy  : 8/3/1929    MRN: [id number]                              Today's Date: 2021       Admit Date: 2021    Visit Dx:     ICD-10-CM ICD-9-CM   1. Generalized weakness  R53.1 780.79   2. Acute renal failure, unspecified acute renal failure type (CMS/[facility])  N17.9 584.9   3. Hyperkalemia  E87.5 276.7     Patient Active Problem List   Diagnosis   • PRABHU treated with autoBiPAP   • Hypersomnia due to medical condition   • Chronic congestive heart failure (CMS/[facility])   • HTN (hypertension)   • Type 2 diabetes mellitus, with long-term current use of insulin (CMS/[facility])   • CKD (chronic kidney disease) stage 3, GFR 30-59 ml/min (CMS/[facility])   • COLLIN (acute kidney injury) (CMS/[facility])   • Generalized weakness   • Hypotension   • Weight loss   • Anemia   • Hyperkalemia   • Chronic diastolic CHF (congestive heart failure) (CMS/[facility])     Past Medical History:   Diagnosis Date   • Arthritis    • Diabetes 1.5, managed as type 2 (CMS/[facility])    • Edema    • Hypertension    • Hypothyroidism    • Low back pain    • Macular degeneration      Past Surgical History:   Procedure Laterality Date   • CARDIAC SURGERY     • CHOLECYSTECTOMY     • HIATAL HERNIA REPAIR     • HYSTERECTOMY       General Information     Row Name 21 1513          OT Time and Intention    Document Type  therapy note (daily note)  -BT     Mode of Treatment  individual therapy;occupational therapy  -BT     Row Name 21 1513          General Information    Patient Profile Reviewed  yes  -BT     Existing Precautions/Restrictions  fall  -BT     Row Name 21 1513          Cognition    Orientation Status (Cognition)  oriented x 3  -BT     Row Name 21 1513          Safety Issues, Functional Mobility    Impairments Affecting Function (Mobility)  balance;coordination;endurance/activity tolerance;postural/trunk control;strength  -BT       User Key  (r) = Recorded By, (t) = Taken By, (c) = Cosigned By    Initials Name Provider  Type    BT Claritza Adams OT Occupational Therapist          Mobility/ADL's     Row Name 06/08/21 1513          Bed Mobility    Comment (Bed Mobility)  pt up in chair upon arrival  -BT     Row Name 06/08/21 1513          Transfers    Transfers  sit-stand transfer  -BT     Sit-Stand [place] (Transfers)  minimum assist (75% patient effort);1 person assist;verbal cues;nonverbal cues (demo/gesture)  -BT     Row Name 06/08/21 1513          Sit-Stand Transfer    Assistive Device (Sit-Stand Transfers)  walker, front-wheeled  -BT     Row Name 06/08/21 1513          Activities of Daily Living    BADL Assessment/Intervention  -- pt declined adls on this date  -BT       User Key  (r) = Recorded By, (t) = Taken By, (c) = Cosigned By    Initials Name Provider Type    BT Claritza Adams OT Occupational Therapist        Obj/Interventions     Row Name 06/08/21 1514          Balance    Balance Assessment  standing static balance;standing dynamic balance  -BT     Static Standing Balance  mild impairment  -BT     Dynamic Standing Balance  mild impairment  -BT     Balance Interventions  standing;static;dynamic  -BT     [facility] Name 06/08/21 1514          Therapeutic Exercise    Therapeutic Exercise  -- x5 chair pushups  -BT       User Key  (r) = Recorded By, (t) = Taken By, (c) = Cosigned By    Initials Name Provider Type    BT Claritza Adams OT Occupational Therapist        Goals/Plan    No documentation.       Clinical Impression     [facility] Name 06/08/21 1515          Pain Assessment    Additional Documentation  Pain Scale: Numbers Pre/Post-Treatment (Group)  -BT     Row Name 06/08/21 1515          Pain Scale: Numbers Pre/Post-Treatment    Pretreatment Pain Rating  0/10 - no pain  -BT     Posttreatment Pain Rating  0/10 - no pain  -BT     Row Name 06/08/21 1515          Plan of Care Review    Plan of Care Reviewed With  patient;caregiver  -BT     Progress  improving  -BT     Outcome Summary  Pt sitting in recliner chair upon OT arrival,  caregiver present, and pt agreeable to work with OT this PM. Pt declined adls, but agreeable to work on functional transfer training. Pt completed 3 STS transfers on this date with min A and cues for positioning and hand placement with rwx. Pt able to stand for 1 minute increments with focus on dynamic standing and reaching outside of [place]TT[place] to improve standing balance and functional reach. Pt completed x5 reps chair pushups to practice clearing bottom from surface and improve STS transfers. Pt will benefit from continued OT.  -BT       User Key  (r) = Recorded By, (t) = Taken By, (c) = Cosigned By    Initials Name Provider Type    BT Claritza Adams OT Occupational Therapist        Outcome Measures     Row Name 06/08/21 1518          How much help from another is currently needed...    Putting on and taking off regular lower body clothing?  2  -BT     Bathing (including washing, rinsing, and drying)  2  -BT     Toileting (which includes using toilet bed pan or urinal)  2  -BT     Putting on and taking off regular upper body clothing  2  -BT     Taking care of personal grooming (such as brushing teeth)  3  -BT     Eating meals  3  -BT     AM-PAC 6 Clicks Score (OT)  14  -BT     Row Name 06/08/21 1333          How much help from another person do you currently need...    Turning from your back to your side while in flat bed without using bedrails?  2  -JM     Moving from lying on back to sitting on the side of a flat bed without bedrails?  2  -JM     Moving to and from a bed to a chair (including a wheelchair)?  3  -JM     Standing up from a chair using your arms (e.g., wheelchair, bedside chair)?  3  -JM     Climbing 3-5 steps with a railing?  1  -JM     To walk in hospital room?  2  -JM     AM-PAC 6 Clicks Score (PT)  13  -JM     Row Name 06/08/21 1518          Functional Assessment    Outcome Measure Options  AM-PAC 6 Clicks Daily Activity (OT)  -BT       User Key  (r) = Recorded By, (t) = Taken By, (c) = Cosigned By     Initials Name Provider Type    Disha Vivar, ESTEBAN Physical Therapy Assistant    BT Claritza Adams OT Occupational Therapist        Occupational Therapy Education                 Title: PT OT SLP Therapies (Done)     Topic: Occupational Therapy (Done)     Point: ADL training (Done)     Description:   Instruct learner(s) on proper safety adaptation and remediation techniques during self care or transfers.   Instruct in proper use of assistive devices.              Learning Progress Summary           Patient Acceptance, E, VU by BT at 6/8/2021 1518    Comment: purpose of OT, functional transfer training    Acceptance, E,TB,D, VU,NR by RA at 6/6/2021 1502    Acceptance, E,D, VU by CURLY at 6/4/2021 1501    Comment: role of OT,plan of care, fall risk, use of call light and call to get up. ed benefit of activity and xfer tech.   Caregiver Acceptance, E,D, VU by CURLY at 6/4/2021 1501    Comment: role of OT,plan of care, fall risk, use of call light and call to get up. ed benefit of activity and xfer tech.                   Point: Precautions (Done)     Description:   Instruct learner(s) on prescribed precautions during self-care and functional transfers.              Learning Progress Summary           Patient Acceptance, E, VU by BT at 6/8/2021 1518    Comment: purpose of OT, functional transfer training    Acceptance, E,TB,D, VU,NR by RA at 6/6/2021 1502    Acceptance, E,D, VU by CURLY at 6/4/2021 1501    Comment: role of OT,plan of care, fall risk, use of call light and call to get up. ed benefit of activity and xfer tech.   Caregiver Acceptance, E,D, VU by CURLY at 6/4/2021 1501    Comment: role of OT,plan of care, fall risk, use of call light and call to get up. ed benefit of activity and xfer tech.                   Point: Body mechanics (Done)     Description:   Instruct learner(s) on proper positioning and spine alignment during self-care, functional mobility activities and/or exercises.              Learning  Progress Summary           Patient Acceptance, E, VU by BT at 6/8/2021 1518    Comment: purpose of OT, functional transfer training    Acceptance, E,TB,D, VU,NR by RA at 6/6/2021 1502    Acceptance, E,D, VU by LE at 6/4/2021 1501    Comment: role of OT,plan of care, fall risk, use of call light and call to get up. ed benefit of activity and xfer tech.   Caregiver Acceptance, E,D, VU by LE at 6/4/2021 1501    Comment: role of OT,plan of care, fall risk, use of call light and call to get up. ed benefit of activity and xfer tech.                               User Key     Initials Effective Dates Name Provider Type Discipline    RA 04/06/17 -  Zenaida Valladares, PT Physical Therapist PT    CURLY 06/08/18 -  Marianna White OTR Occupational Therapist OT    BT 11/16/20 -  Claritza Adams, OT Occupational Therapist OT              OT Recommendation and Plan     Plan of Care Review  Plan of Care Reviewed With: patient, caregiver  Progress: improving  Outcome Summary: Pt sitting in recliner chair upon OT arrival, caregiver present, and pt agreeable to work with OT this PM. Pt declined adls, but agreeable to work on functional transfer training. Pt completed 3 STS transfers on this date with min A and cues for positioning and hand placement with rwx. Pt able to stand for 1 minute increments with focus on dynamic standing and reaching outside of [place] to improve standing balance and functional reach. Pt completed x5 reps chair pushups to practice clearing bottom from surface and improve STS transfers. Pt will benefit from continued OT.     Time Calculation:   Time Calculation- OT     Row Name 06/08/21 1519             Time Calculation- OT    OT Start Time  1338  -BT      OT Stop Time  1401  -BT      OT Time Calculation (min)  23 min  -BT      Total Timed Code Minutes- OT  23 minute(s)  -BT      OT Received On  06/08/21  -BT      OT - Next Appointment  06/10/21  -BT         Timed Charges    38642 - OT Therapeutic Activity Minutes  23   -BT         Total Minutes    Timed Charges Total Minutes  23  -BT       Total Minutes  23  -BT        User Key  (r) = Recorded By, (t) = Taken By, (c) = Cosigned By    Initials Name Provider Type    BT Claritza Adams OT Occupational Therapist        Therapy Charges for Today     Code Description Service Date Service Provider Modifiers Qty    47477916747  OT THERAPEUTIC ACT EA 15 MIN 6/8/2021 Claritza Adams OT GO 2               Claritza Adams OT  6/8/2021

## 2021-06-08 NOTE — PLAN OF CARE
Goal Outcome Evaluation:  Plan of Care Reviewed With: patient, caregiver        Progress: improving  Outcome Summary: Pt sitting in recliner chair upon OT arrival, caregiver present, and pt agreeable to work with OT this PM. Pt declined adls, but agreeable to work on functional transfer training. Pt completed 3 STS transfers on this date with min A and cues for positioning and hand placement with rwx. Pt able to stand for 1 minute increments with focus on dynamic standing and reaching outside of [place] to improve standing balance and functional reach. Pt completed x5 reps chair pushups to practice clearing bottom from surface and improve STS transfers. Pt will benefit from continued OT.    Pt wore face mask. OT wore all PPE and hand hygiene completed before and after.

## 2021-06-08 NOTE — DISCHARGE SUMMARY
Patient Name: Denny Ivy  : 8/3/1929  MRN: [id number]    Date of Admission: 2021  Date of Discharge:  2021  Primary Care Physician: Мария Wilks MD      Chief Complaint:   Altered Mental Status      Discharge Diagnoses     Active Hospital Problems    Diagnosis  POA   • **Generalized weakness [R53.1]  Yes   • Weight loss [R63.4]  Yes   • Anemia [D64.9]  Yes   • Chronic diastolic CHF (congestive heart failure) (CMS/Formerly [facility]) [I50.32]  Yes   • HTN (hypertension) [I10]  Yes   • Type 2 diabetes mellitus, with long-term current use of insulin (CMS/Formerly [facility]) [E11.9, Z79.4]  Not Applicable   • CKD (chronic kidney disease) stage 3, GFR 30-59 ml/min (CMS/Formerly [facility]) [N18.30]  Yes   • PRABHU treated with autoBiPAP [G47.33]  Yes      Resolved Hospital Problems    Diagnosis Date Resolved POA   • Hypotension [I95.9] 2021 Yes   • Hyperkalemia [E87.5] 2021 Yes   • COLLIN (acute kidney injury) (CMS/Formerly [facility]) [N17.9] 2021 Yes        Hospital Course     Ms. Ivy is a 89 y.o. female with a history of dm2, htn, hypothyroidism, chronic diastolic heart failure, ckd 3 who was recently discharged from the hospital on 21 after an admission for acute on chronic heart failure who presented to [facility] initially complaining of weight loss and generalized weakness.  Please see the admitting history and physical for further details.  She was found to have low blood pressures and COLLIN on CKD3 and was admitted to the hospital for further evaluation and treatment.      She was seen in consultation by cardiology and nephrology. She was administered fluids, and her bp meds and diuretics were held. Unfortunately, she then developed hypertensive urgency and her medications were titrated back up. A renal artery doppler was performed which was negative for renal artery stenosis. Her creatinine started to creep back up again, and so her diuretics were reduced. At the time of discharge, her creatinine was stable at  1.58    Her course was complicated by confusion on 6/3/21 leading to a rapid response. CT brain was performed and neurology was consulted. CTA head and neck and MRI brain were also performed. These were all relatively normal without any acute intracranial pathology or significant blockages or evidence of stroke. Some thyroid cysts were incidentally noted, however. Neurology recommended treated this as if it was a TIA with 21 days of DAPT followed by full dose aspirin 325 mg daily indefinitely. Her pravastatin was changed to atorvastatin 40 mg daily. She needs to follow up with neurology in 3 months.     For her thyroid nodules, it is recommended that she undergo thyroid ultrasound as an outpatient for further evaluation. Her TSH was normal.    She will resume home health at discharge.    A referral to ambulatory heart failure clinic has been placed.    Recommendations and discharge plan including medications discussed at length with patient's grand daughter, Smitha Ivy, the patient, and her care giver.    Day of Discharge     Subjective:  No events overnight. Pt reports that she feels well. No shortness of breath. Creatinine is stable. All consultants have cleared for discharge home.    Physical Exam:  Temp:  [97.6 °F (36.4 °C)-98.5 °F (36.9 °C)] 98.2 °F (36.8 °C)  Heart Rate:  [62-76] 67  Resp:  [18] 18  BP: (137-153)/(60-78) 153/66  Body mass index is 37.43 kg/m².  Physical Exam  Constitutional:       General: She is not in acute distress.     Appearance: She is obese. She is ill-appearing (chronically).   Cardiovascular:      Rate and Rhythm: Normal rate and regular rhythm.      Heart sounds: Normal heart sounds.   Pulmonary:      Effort: Pulmonary effort is normal.      Breath sounds: Normal breath sounds.   Abdominal:      General: Bowel sounds are normal.      Palpations: Abdomen is soft.   Musculoskeletal:         General: Tenderness present.      Right lower leg: No edema.      Left lower leg: No edema.       Comments: LLE tenderness   Neurological:      Mental Status: She is alert.   Psychiatric:         Mood and Affect: Mood normal.         Behavior: Behavior normal.         Consultants     Consult Orders (all) (From admission, onward)     Start     Ordered    06/06/21 1401  Inpatient Nutrition Consult  Once     Comments: Nutritional supplement recommendation   Provider:  (Not yet assigned)    06/06/21 1400    06/06/21 1356  Inpatient Nutrition Consult  Once,   Status:  Canceled     Provider:  (Not yet assigned)    06/06/21 1356    06/06/21 1152  Inpatient Nephrology Consult  Once     Specialty:  Nephrology  Provider:  Lemuel Soriano MD    06/06/21 1151    06/03/21 2011  Notify Stroke Coordinator  Once     Provider:  (Not yet assigned)    06/03/21 2012 06/03/21 2011  Inpatient Rehab Admission Consult  Once     Provider:  (Not yet assigned)    06/03/21 2012 06/03/21 2011  Inpatient Case Management  Consult  Once     Provider:  (Not yet assigned)    06/03/21 2012 06/03/21 2011  Inpatient Diabetes Educator Consult  Once,   Status:  Canceled     Provider:  (Not yet assigned)    06/03/21 2012 06/03/21 1518  Inpatient Access Center Consult  Once     Provider:  (Not yet assigned)    06/03/21 1517    06/03/21 1206  Inpatient Neurology Consult Stroke  Once     Specialty:  Neurology  Provider:  Nico Raines MD    06/03/21 1207    05/31/21 0107  Inpatient Cardiology Consult  Once     Specialty:  Cardiology  Provider:  Disha Marsh MD    05/31/21 0107    05/30/21 2226  [facility] (on-call MD unless specified) Details  Once     Specialty:  Hospitalist  Provider:  (Not yet assigned)    05/30/21 2226              Procedures     Imaging Results (All)     Procedure Component Value Units Date/Time    CT Angiogram Head [[id number]] Collected: 06/04/21 1249     Updated: 06/04/21 1701    Narrative:      CT ANGIOGRAM NECK AND HEAD WITH CONTRAST     HISTORY: TIA, confusion, altered mental  status.     Initially, noncontrasted CT examination of brain was performed. There is  no evidence of intracranial hemorrhage, hydrocephalus or of abnormal  extra-axial fluid. Moderate small vessel ischemic disease is noted. Area  of dural calcification is noted lateral to the left frontal lobe  measuring 3 x 4 mm in size consistent with a small meningioma.     A CT angiogram of the neck and head was then performed. Multiplanar as  well as 3-dimensional reconstructions were generated.     FINDINGS: The great vessels are arranged in a classic configuration.  Atherosclerotic disease is appreciated involving the origins of the  great vessels but without stenosis. There is 0% stenosis involving the  carotid bifurcations using NASCET criteria. The distal aspects of the  internal carotid arteries demonstrate moderate vascular calcification  with no evidence of a focal high-grade stenosis. The proximal aspects of  the anterior and middle cerebral arteries appear unremarkable.     Both vertebral arteries were opacified. The left vertebral artery is  slightly larger than that of the right. A small sal of vascular  calcification is noted at the origin of the left vertebral artery. A  mild to moderate stenosis of the left vertebral artery origin could not  be excluded. There is no evidence of a focal high-grade stenosis of the  cervical segments of the vertebral arteries. The basilar artery and the  proximal aspects of the posterior cerebral arteries appear unremarkable.     The thyroid glands are heterogeneous with multiple cysts present  bilaterally the largest of which are complex and septated. The largest  cyst involves the right lobe of thyroid and measures approximately 2.6  cm in size. These are new versus the CT examination of the chest  performed on 04/04/2009. Sagittal reconstructions demonstrate severe  loss of disc height at C4-5 and moderate loss of disc height at C3-4.  There is grade 1 anterolisthesis of C2  upon C3 estimated to be  approximately 4 mm, 2 mm of anterolisthesis of C3 upon C4 and 3-4 mm of  anterolisthesis of C4 upon C5. Anterior bridging osteophyte is present  at C5-6.       Impression:      1.  No evidence of focal high-grade intracranial stenosis or aneurysm  proximally. There is 0% stenosis involving the carotid bifurcations  using NASCET criteria. See above.  2.  Multiple cysts are noted involving the thyroid bilaterally new  versus 04/04/2009 with the largest measuring approximately 2.6 cm in  diameter and demonstrating thin septations centrally.        Radiation dose reduction techniques were utilized, including automated  exposure control and exposure modulation based on body size.     This report was finalized on 6/4/2021 4:58 PM by Dr. Stanton Hoyos M.D.       CT Angiogram Neck [[id number]] Collected: 06/04/21 1249     Updated: 06/04/21 1701    Narrative:      CT ANGIOGRAM NECK AND HEAD WITH CONTRAST     HISTORY: TIA, confusion, altered mental status.     Initially, noncontrasted CT examination of brain was performed. There is  no evidence of intracranial hemorrhage, hydrocephalus or of abnormal  extra-axial fluid. Moderate small vessel ischemic disease is noted. Area  of dural calcification is noted lateral to the left frontal lobe  measuring 3 x 4 mm in size consistent with a small meningioma.     A CT angiogram of the neck and head was then performed. Multiplanar as  well as 3-dimensional reconstructions were generated.     FINDINGS: The great vessels are arranged in a classic configuration.  Atherosclerotic disease is appreciated involving the origins of the  great vessels but without stenosis. There is 0% stenosis involving the  carotid bifurcations using NASCET criteria. The distal aspects of the  internal carotid arteries demonstrate moderate vascular calcification  with no evidence of a focal high-grade stenosis. The proximal aspects of  the anterior and middle cerebral arteries appear  unremarkable.     Both vertebral arteries were opacified. The left vertebral artery is  slightly larger than that of the right. A small sal of vascular  calcification is noted at the origin of the left vertebral artery. A  mild to moderate stenosis of the left vertebral artery origin could not  be excluded. There is no evidence of a focal high-grade stenosis of the  cervical segments of the vertebral arteries. The basilar artery and the  proximal aspects of the posterior cerebral arteries appear unremarkable.     The thyroid glands are heterogeneous with multiple cysts present  bilaterally the largest of which are complex and septated. The largest  cyst involves the right lobe of thyroid and measures approximately 2.6  cm in size. These are new versus the CT examination of the chest  performed on 04/04/2009. Sagittal reconstructions demonstrate severe  loss of disc height at C4-5 and moderate loss of disc height at C3-4.  There is grade 1 anterolisthesis of C2 upon C3 estimated to be  approximately 4 mm, 2 mm of anterolisthesis of C3 upon C4 and 3-4 mm of  anterolisthesis of C4 upon C5. Anterior bridging osteophyte is present  at C5-6.       Impression:      1.  No evidence of focal high-grade intracranial stenosis or aneurysm  proximally. There is 0% stenosis involving the carotid bifurcations  using NASCET criteria. See above.  2.  Multiple cysts are noted involving the thyroid bilaterally new  versus 04/04/2009 with the largest measuring approximately 2.6 cm in  diameter and demonstrating thin septations centrally.        Radiation dose reduction techniques were utilized, including automated  exposure control and exposure modulation based on body size.     This report was finalized on 6/4/2021 4:58 PM by Dr. Stanton Hoyos M.D.       MRI Brain Without Contrast [[id number]] Collected: 06/04/21 1101     Updated: 06/04/21 1215    Narrative:      MRI EXAMINATION BRAIN WITHOUT CONTRAST     HISTORY: TIA.      COMPARISON: CT head 06/03/2021.     TECHNIQUE: A MRI examination of brain was performed utilizing sagittal  T1, axial diffusion, T1, T2, T2 FLAIR and gradient echo T2 sequences.      FINDINGS: There is no evidence of restricted diffusion or of  hydrocephalus. Prominent CSF is noted anterolateral and lateral to the  cerebellar hemispheres bilaterally similar in appearance as compared to  the CT examination of 05/11/2012. Moderate small vessel ischemic disease  is noted. There is no evidence of mass or mass effect on this  noncontrasted MRI examination of the brain.     On the axial gradient echo T2 sequence a subtle area of signal loss is  appreciated related which appears to be dural-based overlying the  superolateral aspect of left frontal lobe. This could represent a  partially calcified meningioma and measures approximately 11 mm in the  AP dimension and 8 mm in transverse dimension.        Impression:      1.  Moderate small vessel ischemic disease. There is no evidence of  acute infarction.     This report was finalized on 6/4/2021 12:12 PM by Dr. Stanton Hoyos M.D.       CT Head Without Contrast [[id number]] Collected: 06/03/21 1517     Updated: 06/04/21 1003    Narrative:      CT SCAN HEAD WITHOUT CONTRAST     CLINICAL HISTORY: Less responsive. Right-sided weakness.     CT scan of the head was obtained with 3 mm axial images. No intravenous  contrast was administered.     Comparison is made to previous CT scan of the head dated 07/06/2018.     FINDINGS:     There are moderate changes of chronic small vessel ischemic phenomena.  No significant interval change is seen in these findings. The  ventricles, sulci, and cisterns are age appropriate. The basal ganglia  and thalami are unremarkable in appearance. The posterior fossa  structures are within normal limits. Atherosclerotic changes are seen  within the intracranial vasculature.     Incidental note is made of a 3 mm ossific or densely  calcific  extra-axial lesion lateral to the left frontal lobe compatible with  either a densely calcified meningioma or an osteoma. This is unchanged  when compared to the prior head CT dated 07/06/2018.       Impression:         No CT evidence for acute intracranial pathology.      Further evaluation could be performed with MR imaging, as clinically  indicated.     Moderate changes of chronic small vessel ischemic phenomena are again  incidentally noted.     A 3 mm ossific or densely calcific lesion lateral to the left frontal  lobe compatible with either an osteoma or densely calcified meningioma  is unchanged.     Radiation dose reduction techniques were utilized, including automated  exposure control and exposure modulation based on body size.     This report was finalized on 6/4/2021 9:59 AM by Dr. Timbo Coughlin M.D.             Pertinent Labs     Results from last 7 days   Lab Units 06/08/21 0615 06/07/21  0553 06/06/21 0432 06/04/21  0549   WBC 10*3/mm3 5.51 5.43 4.68 4.81   HEMOGLOBIN g/dL 10.4* 10.9* 10.1* 10.3*   PLATELETS 10*3/mm3 178 185 186 182     Results from last 7 days   Lab Units 06/08/21 0615 06/07/21  0553 06/06/21 0432 06/05/21  0521   SODIUM mmol/L 138 141 140 140   POTASSIUM mmol/L 4.3 3.9 4.2 4.2   CHLORIDE mmol/L 102 101 102 101   CO2 mmol/L 28.4 28.8 29.0 30.3*   BUN mg/dL 47* 39* 37* 32*   CREATININE mg/dL 1.58* 1.58* 1.46* 1.20*   GLUCOSE mg/dL 171* 79 98 88   Estimated Creatinine Clearance: 23.7 mL/min (A) (by C-G formula based on SCr of 1.58 mg/dL (H)).  Results from last 7 days   Lab Units 06/08/21 0615 06/07/21  0553 06/06/21 0432 06/04/21  0549   ALBUMIN g/dL 3.20* 3.30* 3.00* 3.20*     Results from last 7 days   Lab Units 06/08/21  0615 06/07/21  0553 06/06/21  0432 06/05/21  0521 06/04/21  0549 06/03/21  0545   CALCIUM mg/dL 9.0 9.2 9.1 9.4 9.4 9.6   ALBUMIN g/dL 3.20* 3.30* 3.00*  --  3.20* 3.70   MAGNESIUM mg/dL  --  1.9 1.9  --  1.8 1.9   PHOSPHORUS mg/dL 3.0 4.0 3.7  --   3.4 2.8           Results from last 7 days   Lab Units 06/04/21  0549   CHOLESTEROL mg/dL 133   TRIGLYCERIDES mg/dL 69   HDL CHOL mg/dL 55   LDL CHOL mg/dL 64           Test Results Pending at Discharge       Discharge Details        Discharge Medications      New Medications      Instructions Start Date   atorvastatin 40 MG tablet  Commonly known as: LIPITOR   40 mg, Oral, Nightly      cholecalciferol 25 MCG (1000 UT) tablet  Commonly known as: VITAMIN D3   1,000 Units, Oral, Daily   Start Date: June 9, 2021     cloNIDine 0.1 MG tablet  Commonly known as: CATAPRES   0.1 mg, Oral, Every 12 Hours Scheduled      clopidogrel 75 MG tablet  Commonly known as: PLAVIX   75 mg, Oral, Daily   Start Date: June 9, 2021     dilTIAZem  MG 24 hr capsule  Commonly known as: CARDIZEM CD  Replaces: dilTIAZem 120 MG 24 hr capsule   300 mg, Oral, Every 24 Hours Scheduled   Start Date: June 9, 2021        Changes to Medications      Instructions Start Date   aspirin 81 MG EC tablet  What changed: Another medication with the same name was added. Make sure you understand how and when to take each.   81 mg, Oral, Daily   Start Date: June 9, 2021     aspirin  MG tablet  What changed: You were already taking a medication with the same name, and this prescription was added. Make sure you understand how and when to take each.   325 mg, Oral, Daily   Start Date: June 26, 2021     torsemide 20 MG tablet  Commonly known as: DEMADEX  What changed: how much to take   20 mg, Oral, Daily   Start Date: June 9, 2021        Continue These Medications      Instructions Start Date   Accu-Chek Harriet Plus test strip  Generic drug: glucose blood   CBS three times daily      B-D UF III MINI PEN NEEDLES 31G X 5 MM misc  Generic drug: Insulin Pen Needle   400 each, Other      buPROPion 75 MG tablet  Commonly known as: WELLBUTRIN   75 mg, Oral, 2 Times Daily      hydrALAZINE 100 MG tablet  Commonly known as: APRESOLINE   100 mg, Oral, 3 Times  Daily      HYDROcodone-acetaminophen 5-325 MG per tablet  Commonly known as: NORCO   1 tablet, Oral, 4 Times Daily PRN      insulin glargine 100 UNIT/ML injection  Commonly known as: LANTUS, SEMGLEE   22 Units, Subcutaneous, Nightly      insulin lispro 100 UNIT/ML injection  Commonly known as: humaLOG   Subcutaneous, 3 Times Daily Before Meals, Sliding scale      isosorbide mononitrate 120 MG 24 hr tablet  Commonly known as: IMDUR   120 mg, Oral, Daily      meclizine 25 MG tablet  Commonly known as: ANTIVERT   25 mg, Oral, 3 Times Daily PRN      metoprolol succinate XL 50 MG 24 hr tablet  Commonly known as: TOPROL-XL   150 mg, Oral, Nightly      ondansetron ODT 4 MG disintegrating tablet  Commonly known as: ZOFRAN-ODT   4 mg, Oral, Every 6 Hours PRN         Stop These Medications    dilTIAZem 120 MG 24 hr capsule  Commonly known as: TIAZAC  Replaced by: dilTIAZem  MG 24 hr capsule     pravastatin 40 MG tablet  Commonly known as: PRAVACHOL            Allergies   Allergen Reactions   • Vasotec [Enalapril] Anaphylaxis   • Atacand Hct [Candesartan Cilexetil-Hctz]      Pt does not know reaction   • Calan [Verapamil]      Pt does not know reaction   • Glucophage [Metformin Hcl]      Pt does not know reaction   • Hytrin [Terazosin]      Pt does not know reaction   • Metolazone      Pt does not know reaction   • Norvasc [Amlodipine Besylate]      Pt does not know reaction   • Thiazide-Type Diuretics      Pt does not know reaction         Discharge Disposition:  Home or Self Care    Discharge Diet:  Diet Order   Procedures   • Diet Regular; Consistent Carbohydrate       Discharge Activity:   Activity Instructions     Activity as Tolerated            CODE STATUS:    Code Status and Medical Interventions:   Ordered at: 05/31/21 0050     Code Status:    CPR     Medical Interventions (Level of Support Prior to Arrest):    Full       Future Appointments   Date Time Provider Department Center   7/8/2021  1:00 PM Lesley  DENNY Marques [facility]     Additional Instructions for the Follow-ups that You Need to Schedule     Ambulatory Referral to Home Health   As directed      Face to Face Visit Date: 6/8/2021    Follow-up provider for Plan of Care?: I treated the patient in an acute care facility and will not continue treatment after discharge.    Follow-up provider: VENU WILKS [[id number]]    Reason/Clinical Findings: age related debility/hospitalization    Describe mobility limitations that make leaving home difficult: age related debility/hospitalization    Nursing/Therapeutic Services Requested: Skilled Nursing Physical Therapy    Skilled nursing orders: Medication education Pain management    PT orders: Transfer training Home safety assessment Strengthening    Frequency: 1 Week 1         Call MD With Problems / Concerns   As directed      Instructions: call your primary care physician if you experience chest pain, shortness of breath, abdominal pain, nausea, vomiting, fevers, sweats, chills, or worsening of your symptoms    Order Comments: Instructions: call your primary care physician if you experience chest pain, shortness of breath, abdominal pain, nausea, vomiting, fevers, sweats, chills, or worsening of your symptoms          Discharge Follow-up with PCP   As directed       Currently Documented PCP:    Venu Wilks MD    PCP Phone Number:    [phone]     Follow Up Details: in 1-2 weeks         Discharge Follow-up with Specialty: cardiology; 2 Weeks   As directed      Specialty: cardiology    Follow Up: 2 Weeks         Discharge Follow-up with Specified Provider: neurology; 3 Months   As directed      To: neurology    Follow Up: 3 Months           Follow-up Information     [facility] .    Specialty: Cardiology  Contact information:  [address] [place] [id number] [phone]           Venu Wilks MD .    Specialty: Internal Medicine  Why: in 1-2  weeks  Contact information:  [address]  [place] [id number]  [phone]                   Additional Instructions for the Follow-ups that You Need to Schedule     Ambulatory Referral to Home Health   As directed      Face to Face Visit Date: 6/8/2021    Follow-up provider for Plan of Care?: I treated the patient in an acute care facility and will not continue treatment after discharge.    Follow-up provider: VENU WILKS [[id number]]    Reason/Clinical Findings: age related debility/hospitalization    Describe mobility limitations that make leaving home difficult: age related debility/hospitalization    Nursing/Therapeutic Services Requested: Skilled Nursing Physical Therapy    Skilled nursing orders: Medication education Pain management    PT orders: Transfer training Home safety assessment Strengthening    Frequency: 1 Week 1         Call MD With Problems / Concerns   As directed      Instructions: call your primary care physician if you experience chest pain, shortness of breath, abdominal pain, nausea, vomiting, fevers, sweats, chills, or worsening of your symptoms    Order Comments: Instructions: call your primary care physician if you experience chest pain, shortness of breath, abdominal pain, nausea, vomiting, fevers, sweats, chills, or worsening of your symptoms          Discharge Follow-up with PCP   As directed       Currently Documented PCP:    Venu Wilks MD    PCP Phone Number:    [phone]     Follow Up Details: in 1-2 weeks         Discharge Follow-up with Specialty: cardiology; 2 Weeks   As directed      Specialty: cardiology    Follow Up: 2 Weeks         Discharge Follow-up with Specified Provider: neurology; 3 Months   As directed      To: neurology    Follow Up: 3 Months           Time Spent on Discharge:  Greater than 30 minutes      Chauncey Dyer MD  [facility]  06/08/21  16:49 EDT

## 2021-06-08 NOTE — PLAN OF CARE
Goal Outcome Evaluation:  Plan of Care Reviewed With: patient, caregiver        Progress: improving  Outcome Summary: Pt agreed to get OOB, but wasn't initially sure how far she could tolerate amb; pt got to doorway and took a few more steps before turning around, last 10ft of amb was slower paced and incr leaning over rwx-offered chair to rest and pt declined; plans HOME at [place] as she has 24/7 caregivers    Patient was wearing a face mask during this therapy encounter. Therapist used appropriate personal protective equipment including eye protection, mask, and gloves.  Mask used was standard procedure mask. Appropriate PPE was worn during the entire therapy session. Hand hygiene was completed before and after therapy session. Patient is not in enhanced droplet precautions.

## 2021-06-08 NOTE — PROGRESS NOTES
"    Patient Name: Denny Ivy  :8/3/1929  89 y.o.      Patient Care Team:  Мария Wilks MD as PCP - General (Internal Medicine)  Мария Wilks MD as PCP - Internal Medicine    Chief Complaint: follow up hypertension, chronic diastolic heart failure    Interval History:  She is sitting up in the chair. Caregiver at bedside. She says her breathing is fine.     Objective   Vital Signs  Temp:  [97.6 °F (36.4 °C)-98.9 °F (37.2 °C)] 98.5 °F (36.9 °C)  Heart Rate:  [66-80] 67  Resp:  [18] 18  BP: (126-152)/(52-78) 137/65    Intake/Output Summary (Last 24 hours) at 2021 1155  Last data filed at 2021 0900  Gross per 24 hour   Intake 470 ml   Output --   Net 470 ml     Flowsheet Rows      First Filed Value   Admission Height  154.9 cm (61\") Documented at 2021   Admission Weight  83.8 kg (184 lb 11.2 oz) Documented at 2021          Physical Exam:   General Appearance:   awake. Up in chair.    Lungs:     Clear to auscultation.  Normal respiratory effort and rate.      Heart:    Regular rhythm and normal rate, normal S1 and S2, no murmurs, gallops or rubs.     Chest Wall:    No abnormalities observed   Abdomen:     Soft, nontender, positive bowel sounds.     Extremities:   no cyanosis, clubbing or edema.  No marked joint deformities.  Adequate musculoskeletal strength.       Results Review:    Results from last 7 days   Lab Units 21  0615   SODIUM mmol/L 138   POTASSIUM mmol/L 4.3   CHLORIDE mmol/L 102   CO2 mmol/L 28.4   BUN mg/dL 47*   CREATININE mg/dL 1.58*   GLUCOSE mg/dL 171*   CALCIUM mg/dL 9.0         Results from last 7 days   Lab Units 21  0615   WBC 10*3/mm3 5.51   HEMOGLOBIN g/dL 10.4*   HEMATOCRIT % 31.3*   PLATELETS 10*3/mm3 178         Results from last 7 days   Lab Units 21  0553   MAGNESIUM mg/dL 1.9     Results from last 7 days   Lab Units 21  0549   CHOLESTEROL mg/dL 133   TRIGLYCERIDES mg/dL 69   HDL CHOL mg/dL 55   LDL CHOL mg/dL 64       "         Medication Review:   aspirin, 81 mg, Oral, Daily  atorvastatin, 40 mg, Oral, Nightly  buPROPion, 75 mg, Oral, Q12H  cholecalciferol, 1,000 Units, Oral, Daily  cloNIDine, 0.1 mg, Oral, Q12H  clopidogrel, 75 mg, Oral, Daily  dilTIAZem CD, 300 mg, Oral, Q24H  enoxaparin, 30 mg, Subcutaneous, Q24H  hydrALAZINE, 100 mg, Oral, Q8H  insulin glargine, 10 Units, Subcutaneous, Nightly  insulin lispro, 0-14 Units, Subcutaneous, TID AC  isosorbide mononitrate, 120 mg, Oral, Daily  metoprolol succinate XL, 150 mg, Oral, Nightly  sodium chloride, 10 mL, Intravenous, Q12H  sodium chloride, 10 mL, Intravenous, Q12H  torsemide, 20 mg, Oral, Daily              Assessment/Plan       1.  Dyspnea most likely secondary to acute diastolic heart failure. She is back on oral diuretics. Denies SOA now.  2.  Hypertensive urgency   3.  acute on Chronic kidney disease  4.  Diabetes  5.  Obstructive sleep apnea.  6.  Acute mental status change    - she was initially hypotensive with COLLIN. This has resolved and she is back on her home regimen and BP remains uncontrolled. Spironolactone added 6/4. BP improving.creatinine bumped a little yesterday. torsemide reduced. Spironolactone stopped.   - nephrology now following near her baseline.   - renal artery duplex without renal artery stenosis  - BP stable enough for discharge from my standpoint. No spironolactone. Clonidine added yesterday. Appreciate nephrology input as her kidney function and volume status are tenuous.   - ok for discharge from cardiac standpoint.    DENNY Lopez  [facility]  06/08/21  11:55 EDT

## 2021-06-09 ENCOUNTER — TELEPHONE (OUTPATIENT)
Dept: CARDIOLOGY | Facility: HOSPITAL | Age: 86
End: 2021-06-09

## 2021-06-09 ENCOUNTER — TRANSCRIBE ORDERS (OUTPATIENT)
Dept: HOME HEALTH SERVICES | Facility: HOME HEALTHCARE | Age: 86
End: 2021-06-09

## 2021-06-09 ENCOUNTER — READMISSION MANAGEMENT (OUTPATIENT)
Dept: CALL CENTER | Facility: HOSPITAL | Age: 86
End: 2021-06-09

## 2021-06-09 ENCOUNTER — HOME HEALTH ADMISSION (OUTPATIENT)
Dept: HOME HEALTH SERVICES | Facility: HOME HEALTHCARE | Age: 86
End: 2021-06-09

## 2021-06-09 DIAGNOSIS — I10 ESSENTIAL HYPERTENSION, MALIGNANT: Primary | ICD-10-CM

## 2021-06-09 NOTE — TELEPHONE ENCOUNTER
"On Centrum Silver website states that there is no problem with Centrum Silver and Vitamin D 3.  Centrum Silver does have vitamin D 3 in it.  No problem with other medications.      Reason for Disposition  • Caller has medication question only, adult not sick, and triager answers question    Additional Information  • Negative: Drug overdose and triager unable to answer question  • Negative: Caller requesting information unrelated to medicine  • Negative: Caller requesting a prescription for Strep throat and has a positive culture result  • Negative: Rash while taking a medication or within 3 days of stopping it  • Negative: Immunization reaction suspected  • Negative: [1] Asthma and [2] having symptoms of asthma (cough, wheezing, etc.)  • Negative: [1] Influenza symptoms AND [2] anti-viral med prescription request, such as Tamiflu  • Negative: [1] Symptom of illness (e.g., headache, abdominal pain, earache, vomiting) AND [2] more than mild  • Negative: MORE THAN A DOUBLE DOSE of a prescription or over-the-counter (OTC) drug  • Negative: [1] DOUBLE DOSE (an extra dose or lesser amount) of over-the-counter (OTC) drug AND [2] any symptoms (e.g., dizziness, nausea, pain, sleepiness)  • Negative: [1] DOUBLE DOSE (an extra dose or lesser amount) of prescription drug AND [2] any symptoms (e.g., dizziness, nausea, pain, sleepiness)  • Negative: Took another person's prescription drug  • Negative: [1] DOUBLE DOSE (an extra dose or lesser amount) of prescription drug AND [2] NO symptoms (Exception: a double dose of antibiotics)  • Negative: Diabetes drug error or overdose (e.g., took wrong type of insulin or took extra dose)  • Negative: [1] Request for URGENT new prescription or refill of \"essential\" medication (i.e., likelihood of harm to patient if not taken) AND [2] triager unable to fill per unit policy  • Negative: [1] Prescription not at pharmacy AND [2] was prescribed by PCP recently  • Negative: [1] Pharmacy calling " "with prescription questions AND [2] triager unable to answer question  • Negative: [1] Caller has URGENT medication question about med that PCP or specialist prescribed AND [2] triager unable to answer question  • Negative: [1] Caller has NON-URGENT medication question about med that PCP prescribed AND [2] triager unable to answer question  • Negative: [1] Caller requesting a NON-URGENT new prescription or refill AND [2] triager unable to refill per unit policy  • Negative: [1] Caller has medication question about med not prescribed by PCP AND [2] triager unable to answer question (e.g., compatibility with other med, storage)  • Negative: Caller requesting a CONTROLLED substance prescription refill (e.g., narcotics, ADHD medicines)  • Negative: Caller wants to use a complementary or alternative medicine  • Negative: [1] Prescription prescribed recently is not at pharmacy AND [2] triager has access to patient's EMR AND [3] prescription is recorded in the EMR  • Negative: [1] DOUBLE DOSE (an extra dose or lesser amount) of over-the-counter (OTC) drug AND [2] NO symptoms  • Negative: [1] DOUBLE DOSE (an extra dose or lesser amount) of antibiotic drug AND [2] NO symptoms    Answer Assessment - Initial Assessment Questions  1.   NAME of MEDICATION: \"What medicine are you calling about?\"      On discharge from the hospital patient was prescribed vitamin D3.  They are asking can she take her Centrum Silver with her other medications.    2.   QUESTION: \"What is your question?\"      Can she take her Centrum Silver with her other medications.    3.   PRESCRIBING HCP: \"Who prescribed it?\" Reason: if prescribed by specialist, call should be referred to that group.      Unknown.    4. SYMPTOMS: \"Do you have any symptoms?\"      No   5. SEVERITY: If symptoms are present, ask \"Are they mild, moderate or severe?\"      No   6.  PREGNANCY:  \"Is there any chance that you are pregnant?\" \"When was your last menstrual period?\"      " No.    Protocols used: MEDICATION QUESTION CALL-ADULT-

## 2021-06-09 NOTE — NURSING NOTE
D/C education completed w/ patient, granddaughter and son. Went over medications and follow up appts

## 2021-06-09 NOTE — TELEPHONE ENCOUNTER
Called Pt to schedule appt.  Care provider gave me phone number of granddaughter Smitha to call for setting up appt. ([phone]).  Will call Pt's granddaughter.     Eugenia Soria RN  [facility]

## 2021-06-09 NOTE — CASE MANAGEMENT/SOCIAL WORK
Case Management Discharge Note      Final Note: DC'd home with [facility] . Connie Johnson RN         Selected Continued Care - Discharged on 6/8/2021 Admission date: 5/30/2021 - Discharge disposition: Home or Self Care    Destination    No services have been selected for the patient.              Durable Medical Equipment    No services have been selected for the patient.              Dialysis/Infusion    No services have been selected for the patient.              Home Medical Care Coordination complete    Service Provider Selected Services Address Phone Fax Patient Preferred     [facility]  [facility] Services [address] [address] [phone] --          Therapy    No services have been selected for the patient.              Community Resources    No services have been selected for the patient.              Community & DME    No services have been selected for the patient.                Selected Continued Care - Prior Encounters Includes selections from prior encounters from 3/1/2021 to 6/8/2021    Discharged on 5/28/2021 Admission date: 5/22/2021 - Discharge disposition: Home-Health Care c    Home Medical Care     Service Provider Selected Services Address Phone Fax Patient Preferred     [facility]  Home Health Services [address] [address] [phone] --                    Transportation Services  Private: Car    Final Discharge Disposition Code: 06 - home with home health care

## 2021-06-09 NOTE — TELEPHONE ENCOUNTER
Called Pt's granddaughter to schedule Pt appt.  No answer.  VM box full, so unable to leave message.  Will try to call back later today.    Eugenia Soria RN  [facility]

## 2021-06-09 NOTE — TELEPHONE ENCOUNTER
Pt's granddaughter called.  Appt. scheduled on 6/17/21 at 1:00PM.  I  provided her with directions to the clinic and office telephone number. Asked her to bring Pt's pill bottles to appt.if possible. Understanding and agreement verbalized.    Eugenia Soria RN  [facility]

## 2021-06-09 NOTE — PROGRESS NOTES
[facility] to provide Home Care services. Dr Wilks PCP has approved following POC for HH. Contact information confirmed.

## 2021-06-09 NOTE — OUTREACH NOTE
Prep Survey      Responses   [facility] facility patient discharged from?  [facility]   Is LACE score < 7 ?  No   Emergency Room discharge w/ pulse ox?  No   Eligibility  Readm Mgmt   Discharge diagnosis  Generalized Weakness, Wt Loss, Anemia, CHF, HTH, COLLIN   Does the patient have one of the following disease processes/diagnoses(primary or secondary)?  CHF   Does the patient have Home health ordered?  Yes   What is the Home health agency?   [facility]   Is there a DME ordered?  No   Comments regarding appointments  See AVS   General alerts for this patient  -- [ recently passed away/under hospice care]   Prep survey completed?  Yes          Danna Hawthorne RN

## 2021-06-10 ENCOUNTER — READMISSION MANAGEMENT (OUTPATIENT)
Dept: CALL CENTER | Facility: HOSPITAL | Age: 86
End: 2021-06-10

## 2021-06-10 ENCOUNTER — HOME CARE VISIT (OUTPATIENT)
Dept: HOME HEALTH SERVICES | Facility: HOME HEALTHCARE | Age: 86
End: 2021-06-10

## 2021-06-10 VITALS
RESPIRATION RATE: 18 BRPM | HEART RATE: 71 BPM | SYSTOLIC BLOOD PRESSURE: 134 MMHG | TEMPERATURE: 98 F | DIASTOLIC BLOOD PRESSURE: 68 MMHG | OXYGEN SATURATION: 96 %

## 2021-06-10 VITALS
DIASTOLIC BLOOD PRESSURE: 68 MMHG | WEIGHT: 180.25 LBS | RESPIRATION RATE: 16 BRPM | SYSTOLIC BLOOD PRESSURE: 134 MMHG | HEART RATE: 71 BPM | BODY MASS INDEX: 34.08 KG/M2 | TEMPERATURE: 98 F

## 2021-06-10 PROCEDURE — G0151 HHCP-SERV OF PT,EA 15 MIN: HCPCS

## 2021-06-10 PROCEDURE — G0299 HHS/HOSPICE OF RN EA 15 MIN: HCPCS

## 2021-06-10 NOTE — HOME HEALTH
Patient reports she is doing fine.  She feels she is doing as well as she was prior to going to the hospital.  Patient's caregiver and family report patient's coordination and mobility have slowed and are currently effected.  They would like patient to improve strength and coordination.  Also questioning ramp placement.

## 2021-06-10 NOTE — OUTREACH NOTE
CHF Week 1 Survey      Responses   [facility] patient discharged from?  [facility]   Does the patient have one of the following disease processes/diagnoses(primary or secondary)?  CHF   CHF Week 1 attempt successful?  Yes   Call start time  1708   Call end time  1717   Discharge diagnosis  Generalized Weakness, Wt Loss, Anemia, CHF, HTH, COLLIN   Is patient permission given to speak with other caregiver?  Yes   Person spoke with today (if not patient) and relationship  Smitha   Meds reviewed with patient/caregiver?  Yes   Is the patient having any side effects they believe may be caused by any medication additions or changes?  No   Does the patient have all medications ordered at discharge?  Yes   Is the patient taking all medications as directed (includes completed medication regime)?  Yes   Does the patient have a primary care provider?   Yes   Does the patient have an appointment with their PCP within 7 days of discharge?  Yes   Has the patient kept scheduled appointments due by today?  N/A   What is the Home health agency?   [facility]   Has home health visited the patient within 72 hours of discharge?  Yes   Pulse Ox monitoring  None   Psychosocial issues?  No   Did the patient receive a copy of their discharge instructions?  Yes   Nursing interventions  Reviewed instructions with patient   What is the patient's perception of their health status since discharge?  Improving   Nursing interventions  Nurse provided patient education   Is the patient weighing daily?  Yes   Does the patient have scales?  Yes   Daily weight interventions  Education provided on importance of daily weight   Is the patient able to teach back Heart Failure diet management?  Yes   Is the patient able to teach back Heart Failure Zones?  Yes   Is the patient able to teach back signs and symptoms of worsening condition? (i.e. weight gain, shortness of air, etc.)  Yes   If the patient is a current smoker, are they able to teach back  resources for cessation?  Not a smoker   Is the patient/caregiver able to teach back the hierarchy of who to call/visit for symptoms/problems? PCP, Specialist, Home health nurse, Urgent Care, ED, 911  Yes   Additional teach back comments  BS in the 100's, She is eating better. She is using her cpap. Will work to decrease caffeine, she weighs daily.   Wrap up additional comments  She is improving, irritable yesterday, some better today she says.          Angelica Marcial, RN

## 2021-06-15 ENCOUNTER — HOME CARE VISIT (OUTPATIENT)
Dept: HOME HEALTH SERVICES | Facility: HOME HEALTHCARE | Age: 86
End: 2021-06-15

## 2021-06-15 VITALS
DIASTOLIC BLOOD PRESSURE: 82 MMHG | SYSTOLIC BLOOD PRESSURE: 148 MMHG | HEART RATE: 84 BPM | TEMPERATURE: 97.9 F | OXYGEN SATURATION: 97 %

## 2021-06-15 VITALS
RESPIRATION RATE: 18 BRPM | TEMPERATURE: 97.7 F | SYSTOLIC BLOOD PRESSURE: 180 MMHG | OXYGEN SATURATION: 93 % | DIASTOLIC BLOOD PRESSURE: 85 MMHG | HEART RATE: 78 BPM

## 2021-06-15 PROCEDURE — G0151 HHCP-SERV OF PT,EA 15 MIN: HCPCS

## 2021-06-15 PROCEDURE — G0152 HHCP-SERV OF OT,EA 15 MIN: HCPCS

## 2021-06-15 NOTE — HOME HEALTH
Patient upset that she was not made aware PT was coming today.  Granddaughter did not have time to consult with patient after scheduling with PT.  Patient states she does not feel like doing any exercise and frequently asks PT to leave.  She eventually agreed to minimal activity and was agreeable to therapist returning in a few days.  Caregiver currently present states she has been getting up well for her but normal caregiver cancelled on family today.

## 2021-06-15 NOTE — HOME HEALTH
CURRENT SITUATION: @ [facility] 05/30/21 - 06/08/21 w/AMS changes, CHF, weakness.   Pt's spouse passed away around [place] time. Since then, she has been w/CGs around the clock (paid and/or family) and that will continue. Grand daughter states she is ~80% of her baseline.    CHIEF COMPLAINT: LLE pain  SUBJECTIVE: Grumpy, but agreeable to OT evaluation.  PREVIOUS O.T. SERVICES: yes  PLOF: assist needed w/most ADLs and all IADLs.  SOCIAL/ENVIRONMENT: Pt lives alone with 24/7 CGs from paid help and family. Home is cluttered.  NEXT M.D. APPT: Friday 06/18/21 @ 2:00

## 2021-06-17 ENCOUNTER — HOME CARE VISIT (OUTPATIENT)
Dept: HOME HEALTH SERVICES | Facility: HOME HEALTHCARE | Age: 86
End: 2021-06-17

## 2021-06-17 ENCOUNTER — TELEPHONE (OUTPATIENT)
Dept: CARDIOLOGY | Facility: HOSPITAL | Age: 86
End: 2021-06-17

## 2021-06-17 VITALS
OXYGEN SATURATION: 97 % | TEMPERATURE: 97.7 F | DIASTOLIC BLOOD PRESSURE: 64 MMHG | SYSTOLIC BLOOD PRESSURE: 122 MMHG | HEART RATE: 74 BPM

## 2021-06-17 VITALS
SYSTOLIC BLOOD PRESSURE: 150 MMHG | BODY MASS INDEX: 34.27 KG/M2 | HEART RATE: 74 BPM | WEIGHT: 181.3 LBS | DIASTOLIC BLOOD PRESSURE: 82 MMHG

## 2021-06-17 PROCEDURE — G0495 RN CARE TRAIN/EDU IN HH: HCPCS

## 2021-06-17 PROCEDURE — G0151 HHCP-SERV OF PT,EA 15 MIN: HCPCS

## 2021-06-17 NOTE — HOME HEALTH
Patient reports she is doing fairly well today.  Still complaining of some left leg tenderness and family is concerned regarding weight gain over the last several days but their records appear inconsistent.  She also has been noting high blood pressure in the mornings with good adjustment after taking medication.  Caregiver feels she is getting up better but patient is still concerned about neck pain, her vision and according to caregiver report, patient is not wanting to wear her CPAP at night over the last 3 to 4 days.    PT did discuss with nursing, Vikcie Ruiz, her findings and the concerns.  Patient will be assessed following PT session.  She has an appointment with cardiology tomorrow.

## 2021-06-17 NOTE — TELEPHONE ENCOUNTER
Called Pt's granddaughter to reschedule appt. tomorrow.  I explained that the clinic provider needs to be out tomorrow so we will not be able to see patients in clinic.  Pt is a new evaluation, so we have not seen her yet.  Pt's granddaughter is concerned because she feels Pt is not acting like herself and is on too many medication.  Her BPs have been low since she was discharged from the hospital and Pt seems drowsy.  I asked if Pt has been seen for her hospital follow up yet.  Per granddaughter she has not, but has had HH and OT coming.  They are coming this afternoon.  I advised the Pt's granddaughter to call Pt's PCP to let her know what is going on/ schedule Pt's follow-up.   Appt scheduled here on Monday, 6-21-21.  Undesrtanding and agreement verbalized.      Eugenia Soria RN  [facility]

## 2021-06-18 ENCOUNTER — HOME CARE VISIT (OUTPATIENT)
Dept: HOME HEALTH SERVICES | Facility: HOME HEALTHCARE | Age: 86
End: 2021-06-18

## 2021-06-18 ENCOUNTER — APPOINTMENT (OUTPATIENT)
Dept: CARDIOLOGY | Facility: HOSPITAL | Age: 86
End: 2021-06-18

## 2021-06-19 ENCOUNTER — HOME CARE VISIT (OUTPATIENT)
Dept: HOME HEALTH SERVICES | Facility: HOME HEALTHCARE | Age: 86
End: 2021-06-19

## 2021-06-21 ENCOUNTER — HOSPITAL ENCOUNTER (OUTPATIENT)
Dept: CARDIOLOGY | Facility: HOSPITAL | Age: 86
Discharge: HOME | End: 2021-06-21
Admitting: INTERNAL MEDICINE

## 2021-06-21 ENCOUNTER — TELEPHONE (OUTPATIENT)
Dept: CARDIOLOGY | Facility: CLINIC | Age: 86
End: 2021-06-21

## 2021-06-21 ENCOUNTER — HOME CARE VISIT (OUTPATIENT)
Dept: HOME HEALTH SERVICES | Facility: HOME HEALTHCARE | Age: 86
End: 2021-06-21

## 2021-06-21 VITALS
RESPIRATION RATE: 20 BRPM | OXYGEN SATURATION: 95 % | DIASTOLIC BLOOD PRESSURE: 82 MMHG | BODY MASS INDEX: 34.36 KG/M2 | SYSTOLIC BLOOD PRESSURE: 148 MMHG | WEIGHT: 182 LBS | HEIGHT: 61 IN | HEART RATE: 83 BPM

## 2021-06-21 DIAGNOSIS — N18.32 ANEMIA DUE TO STAGE 3B CHRONIC KIDNEY DISEASE (HCC): ICD-10-CM

## 2021-06-21 DIAGNOSIS — D63.1 ANEMIA DUE TO STAGE 3B CHRONIC KIDNEY DISEASE (HCC): ICD-10-CM

## 2021-06-21 DIAGNOSIS — N18.32 STAGE 3B CHRONIC KIDNEY DISEASE (HCC): ICD-10-CM

## 2021-06-21 DIAGNOSIS — G47.33 OSA TREATED WITH BIPAP: ICD-10-CM

## 2021-06-21 DIAGNOSIS — G45.9 TIA (TRANSIENT ISCHEMIC ATTACK): ICD-10-CM

## 2021-06-21 DIAGNOSIS — I50.32 CHRONIC HEART FAILURE WITH PRESERVED EJECTION FRACTION (HCC): Primary | ICD-10-CM

## 2021-06-21 PROBLEM — I27.20 PULMONARY HYPERTENSION (HCC): Status: ACTIVE | Noted: 2021-06-21

## 2021-06-21 PROBLEM — N18.9 ANEMIA DUE TO CHRONIC KIDNEY DISEASE: Status: ACTIVE | Noted: 2021-06-21

## 2021-06-21 PROBLEM — I50.9 CHRONIC CONGESTIVE HEART FAILURE: Status: RESOLVED | Noted: 2021-05-22 | Resolved: 2021-06-21

## 2021-06-21 LAB
ANION GAP SERPL CALCULATED.3IONS-SCNC: 11.7 MMOL/L (ref 5–15)
BUN SERPL-MCNC: 37 MG/DL (ref 8–23)
BUN/CREAT SERPL: 28 (ref 7–25)
CALCIUM SPEC-SCNC: 9.7 MG/DL (ref 8.2–9.6)
CHLORIDE SERPL-SCNC: 102 MMOL/L (ref 98–107)
CO2 SERPL-SCNC: 28.3 MMOL/L (ref 22–29)
CREAT SERPL-MCNC: 1.32 MG/DL (ref 0.57–1)
GFR SERPL CREATININE-BSD FRML MDRD: 46 ML/MIN/1.73
GLUCOSE SERPL-MCNC: 241 MG/DL (ref 65–99)
NT-PROBNP SERPL-MCNC: 1065 PG/ML (ref 0–1800)
POTASSIUM SERPL-SCNC: 4.5 MMOL/L (ref 3.5–5.2)
SODIUM SERPL-SCNC: 142 MMOL/L (ref 136–145)

## 2021-06-21 PROCEDURE — 99214 OFFICE O/P EST MOD 30 MIN: CPT | Performed by: INTERNAL MEDICINE

## 2021-06-21 PROCEDURE — 80048 BASIC METABOLIC PNL TOTAL CA: CPT

## 2021-06-21 PROCEDURE — G0463 HOSPITAL OUTPT CLINIC VISIT: HCPCS

## 2021-06-21 PROCEDURE — 83880 ASSAY OF NATRIURETIC PEPTIDE: CPT

## 2021-06-21 RX ORDER — TORSEMIDE 20 MG/1
20 TABLET ORAL DAILY
Qty: 30 TABLET | Refills: 0 | Status: SHIPPED | OUTPATIENT
Start: 2021-06-21 | End: 2021-08-30

## 2021-06-21 NOTE — PROGRESS NOTES
Heart Failure & Pulmonary Arterial Hypertension  Alejandro Barton M.D., Ph.D., [facility]       Chauncey Dyer MD  [address] [facility] [address]  [address]  [place] [id number]    Thank you for asking me to see Denny Ivy for congestive heart failure.    History of Present Illness  This is a 89 y.o. female with:    1.  HFpEF  2. PRABHU  3. CKD    Denny Ivy is a 89 y.o. female who presents for consultation today.  The patient is typically seen by Мария Wilks MD. The patient has a chief complaint of CHF.    Patient was originally admitted to [facility] on May 22, 2021 with ADHF, as well as some chest pain complaints.  2D TTE showed preserved biventricular function with indeterminate diastolic function.  There was mild left ventricular hypertrophy and LAE.  Patient's presentation was also complicated by CKD.  She was diagnosed with HFpEF.  She underwent IV diuresis and medication adjustments for her hypertension.  She presented back to the emergency department on May 30, 2021 with complaints of fatigue.  She was found to be in COLLIN and slight hyperkalemia.  She was admitted to the hospitalist service.  She received IV fluids and down-titration of some of her anti-hypertensive regimen.  Hospital course was then complicated by hypervolemia and elevated blood pressures.  She required ongoing medication adjustments.  Nephrology was also seeing the patient.  Of note, Aldactone was attempted while the patient was inpatient but she had hyperkalemia with a potassium of 5.6 with a GFR of 37.  Nephrology recommend discontinuation of her Aldactone.  Creatinine was 1.58 upon discharge.  Additionally, her hospitalization was complicated by an episode of confusion.  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  Brain MRI was performed.  CTA head neck was also performed without significant intracranial pathology or evidence of atherosclerosis.  Neurology recommended to the hospitalist service that she be treated as if she had a TIA with 21  days of DAPT followed by 3 and 25 mg aspirin indefinitely.  Pravastatin was also changed to atorvastatin 40 mg      She presents to clinic today in follow-up after her most recent admission for ADHF.  She reports today with her caregiver.  She is seated in a wheelchair.  Since her admission this is her first follow-up appointment.  She has an appointment today with her PCP after our appointment.  Medication compliant.  She currently denies any exertional dyspnea.  She is continued to have some lower extremity edema, though she reports this has improved since her last appointment.  No resting, exertional or nocturnal angina.  No dizziness, lightheadedness or syncope.  No further neurological events.  Caregiver reports no confusion.  The patient and her caregiver states that nephrology did contact her for follow-up, but they deferred that appointment because of her appointments today.  They do report planning on contacting nephrology for follow-up.  They do not report an appointment with neurology.  They report that they are overdue for follow-up with Dr. Park for her PRABHU.  She reports intermittent compliance with her CPAP.      Review of Systems - Review of Systems   Cardiovascular: Positive for dyspnea on exertion and leg swelling. Negative for chest pain, claudication, cyanosis, irregular heartbeat, near-syncope, orthopnea, palpitations, paroxysmal nocturnal dyspnea and syncope.   Respiratory: Positive for shortness of breath and sleep disturbances due to breathing. Negative for cough, hemoptysis, snoring, sputum production and wheezing.    All other systems reviewed and are negative.       All other systems were reviewed and were negative.    family history is not on file.     reports that she has never smoked. She has never used smokeless tobacco. She reports that she does not drink alcohol and does not use drugs.    Allergies   Allergen Reactions   • Vasotec [Enalapril] Anaphylaxis   • Atacand Hct  [Candesartan Cilexetil-Hctz]      Pt does not know reaction   • Calan [Verapamil]      Pt does not know reaction   • Glucophage [Metformin Hcl]      Pt does not know reaction   • Hytrin [Terazosin]      Pt does not know reaction   • Metolazone      Pt does not know reaction   • Norvasc [Amlodipine Besylate]      Pt does not know reaction   • Thiazide-Type Diuretics      Pt does not know reaction         Current Outpatient Medications:   •  aspirin 81 MG EC tablet, Take 1 tablet by mouth Daily for 16 days., Disp: 16 tablet, Rfl: 0  •  [START ON 6/26/2021] aspirin  MG tablet, Take 1 tablet by mouth Daily for 30 days., Disp: 30 tablet, Rfl: 0  •  atorvastatin (LIPITOR) 40 MG tablet, Take 1 tablet by mouth Every Night for 30 days., Disp: 30 tablet, Rfl: 0  •  buPROPion (WELLBUTRIN) 75 MG tablet, Take 75 mg by mouth 2 (two) times a day., Disp: , Rfl:   •  cholecalciferol (VITAMIN D3) 25 MCG (1000 UT) tablet, Take 1 tablet by mouth Daily for 30 days., Disp: 30 tablet, Rfl: 0  •  cloNIDine (CATAPRES) 0.1 MG tablet, Take 1 tablet by mouth Every 12 (Twelve) Hours for 30 days., Disp: 60 tablet, Rfl: 0  •  clopidogrel (PLAVIX) 75 MG tablet, Take 1 tablet by mouth Daily for 16 days., Disp: 16 tablet, Rfl: 0  •  dilTIAZem CD (CARDIZEM CD) 300 MG 24 hr capsule, Take 1 capsule by mouth Daily for 30 days., Disp: 30 capsule, Rfl: 0  •  glucose blood (Accu-Chek Harriet Plus) test strip, CBS three times daily, Disp: , Rfl:   •  hydrALAZINE (APRESOLINE) 100 MG tablet, Take 1 tablet by mouth 3 (Three) Times a Day., Disp: 90 tablet, Rfl: 0  •  HYDROcodone-acetaminophen (NORCO) 5-325 MG per tablet, Take 1 tablet by mouth 4 (four) times a day as needed for severe pain (7-10)., Disp: 12 tablet, Rfl: 0  •  insulin glargine (LANTUS) 100 UNIT/ML injection, Inject 22 Units under the skin into the appropriate area as directed Every Night., Disp: , Rfl:   •  insulin lispro (HumaLOG) 100 UNIT/ML injection, Inject  under the skin into the  appropriate area as directed 3 (Three) Times a Day Before Meals. Sliding scale, Disp: , Rfl:   •  Insulin Pen Needle (B-D UF III MINI PEN NEEDLES) 31G X 5 MM misc, 400 each by Other route., Disp: , Rfl:   •  isosorbide mononitrate (IMDUR) 120 MG 24 hr tablet, Take 1 tablet by mouth Daily., Disp: 30 tablet, Rfl: 0  •  meclizine (ANTIVERT) 25 MG tablet, Take 1 tablet by mouth 3 (Three) Times a Day As Needed for dizziness., Disp: 21 tablet, Rfl: 0  •  metoprolol succinate XL (TOPROL-XL) 50 MG 24 hr tablet, Take 3 tablets by mouth Every Night for 30 days., Disp: 90 tablet, Rfl: 0  •  ondansetron ODT (ZOFRAN-ODT) 4 MG disintegrating tablet, Take 1 tablet by mouth Every 6 (Six) Hours As Needed for Nausea., Disp: 10 tablet, Rfl: 0  •  torsemide (DEMADEX) 20 MG tablet, Take 1 tablet by mouth Daily for 30 days., Disp: 30 tablet, Rfl: 0      Physical Exam:  I have reviewed the patient's current vital signs as documented in the patient's EMR.   Vitals:    06/21/21 1321   BP: 148/82   Pulse: 83   Resp: 20   SpO2: 95%     Body mass index is 34.41 kg/m².   Pulse Pressure: normal  Pulse Ox: Normal  on room air  General: alert, appears stated age and cooperative     Body Habitus: obese    HEENT: Head: Normocephalic, no lesions, without obvious abnormality. No arcus senilis, xanthelasma or xanthomas.  No malar flush, proptosis, xanthomas or malar flush.   Neuro: alert, oriented x3  speech normal in context and clarity  memory intact grossly  Pulses: 2+ and symmetric  JVP: Volume/Pulsation: Normal.  Normal x and y descent.  Waveforms: Normal waveforms with a, x, and v waves.   Physiology: Appropriate inspiratory decrease.  No Kussmaul's. No Domingo's.   Carotid Exam: no bruit normal pulsation bilaterally   Carotid Volume: normal.     Subclavian Bruit: absent  Vertebral Bruit: absent  Respirations: no increased work of breathing   Pulmonary:Normal     Precordium: Normal impulses. P2 is not palpable.  RV Heave: absent  LV Heave:  absent  [place]:  normal size and placement  Palpable S4: absent.  Heart rate: normal    Heart Rhythm: regular     Heart Sounds: S1: normal intensity  S2: increased A2  S3: absent   S4: absent  Opening Snap: absent    A2-OS:  no  Pericardial Rub:  Absent: Yes     Ejection click: None      Murmurs:  present    Murmur 1 Type: systolic  Grade: 1/6    Timing: early systolic  Location:   LSB   Description:ejection  Radiation:  nonradiating  Dynamic Auscultation:  increased with respiration   Extremity: moves all extremities equally.   Edema: 1+ 2mm; disappears rapidly      DATA REVIEWED:     EKG. I personally reviewed and interpreted the EKG.      EKG shows sinus mechanism with a RBBB.  QRS duration 133 ms      TTE/LUCAS:  Results for orders placed during the hospital encounter of 05/22/21    Adult Transthoracic Echo Complete W/ Cont if Necessary Per Protocol    Interpretation Summary  · Estimated right ventricular systolic pressure from tricuspid regurgitation is mildly elevated (35-45 mmHg). Calculated right ventricular systolic pressure from tricuspid regurgitation is 42 mmHg.  · Estimated left ventricular EF = 65% Left ventricular systolic function is normal.  · Left ventricular diastolic function was indeterminate.      My interpretation of the TTE is below.       --------------------------------------------------------------------------------------------------    Laboratory evaluations:    Lab Results   Component Value Date    GLUCOSE 171 (H) 06/08/2021    BUN 47 (H) 06/08/2021    CREATININE 1.58 (H) 06/08/2021    EGFRIFAFRI 37 (L) 06/08/2021    BCR 29.7 (H) 06/08/2021    K 4.3 06/08/2021    CO2 28.4 06/08/2021    CALCIUM 9.0 06/08/2021    ALBUMIN 3.20 (L) 06/08/2021    AST 12 06/01/2021    ALT 20 06/01/2021     Lab Results   Component Value Date    WBC 5.51 06/08/2021    HGB 10.4 (L) 06/08/2021    HCT 31.3 (L) 06/08/2021    MCV 92.3 06/08/2021     06/08/2021     Lab Results   Component Value Date    CHOL 133  06/04/2021    TRIG 69 06/04/2021    HDL 55 06/04/2021    LDL 64 06/04/2021     Lab Results   Component Value Date    TSH 2.200 06/01/2021     Lab Results   Component Value Date    TROPONINT 0.015 05/22/2021     Lab Results   Component Value Date    HGBA1C 6.36 (H) 05/31/2021     No results found for: DDIMER  Lab Results   Component Value Date    ALT 20 06/01/2021     Lab Results   Component Value Date    HGBA1C 6.36 (H) 05/31/2021    HGBA1C 6.22 (H) 05/23/2021     Lab Results   Component Value Date    CREATININE 1.58 (H) 06/08/2021     Lab Results   Component Value Date    IRON 79 06/01/2021    TIBC 289 (L) 06/01/2021    FERRITIN 64.20 06/01/2021     Lab Results   Component Value Date    INR 1.01 05/22/2021    INR 1.07 07/06/2018    PROTIME 13.1 05/22/2021    PROTIME 13.7 07/06/2018       Assessment/Plan     1. Chronic heart failure with preserved ejection fraction (CMS/HCC)    2. PRABHU treated with autoBiPAP    3. Stage 3b chronic kidney disease (CMS/HCC)    4. Anemia due to stage 3b chronic kidney disease (CMS/HCC)    5. TIA (transient ischemic attack)      1. NYHA stage C; Functional class NYHA Functional Class: III.  Patient has developed HFpEF likely secondary to longstanding hypertension.  Structural abnormalities were evident including LVH and LAE.  Diastolic dysfunction with indeterminate.        Today on examination and history, CHF volume status: Patient appears euvolemic.; Perfusion status: good. There is not evidence of decompensation. The patient has had recent ED visits or admissions specifically for ADHF.  Today, her blood pressure is improved.    The Rucker Index was updated today. Most recent score: 3. Score change: 0.     -Disease process and medications discussed. Questions answered fully.  Emphasized salt restriction.  Encouraged daily monitoring of the patient's weight.  Encouraged regular exercise.  -CHF-specific BB:    Metoprolol Succinate 150 mg  -ACE/ARB/ARNI:   She has some allergies to these  medications listed, though she does not know the reactions.  Additionally, she recently saw nephrology inpatient and they did not attempt these medications.  For now, will defer until she has follow-up with nephrology.  MRA:  Patient does have HFpEF, without heart failure admission recently and a GFR greater than 30.  However, she had a potassium of 5.6 when an MRA was attempted.  While MRA therapy might decrease the risk of hospitalization in this patient, currently contraindicated given her CKD and history of hyperkalemia.  We will continue to monitor.    -Diuretic Therapy:  ordered  -Torsemide daily    -Nutrition: no nutritional compromise at this time:  Increase knowledge on diet/nutrition effects on condition/status   -BNP, BMP today    2. The patient has documented PRABHU with PSG. PRABHU is an important modifiable CV and PAH risk factor.   -Avoid ETOH, weight reduction  -Treatment of HTN, per PCP  -CPAP  -We will arrange follow-up with Dr. Park    3/4. Follow-up nephrology.     5. Continue DAPT, per neuro. Follow-up neuro.       Return in about 1 week (around 6/28/2021).           This document has been electronically signed by Alejandro Barton MD PhD on June 21, 2021 14:04 EDT

## 2021-06-21 NOTE — PATIENT INSTRUCTIONS
"Low-Sodium Eating Plan  Sodium, which is an element that makes up salt, helps you maintain a healthy balance of fluids in your body. Too much sodium can increase your blood pressure and cause fluid and waste to be held in your body.  Your health care provider or dietitian may recommend following this plan if you have high blood pressure (hypertension), kidney disease, liver disease, or heart failure. Eating less sodium can help lower your blood pressure, reduce swelling, and protect your heart, liver, and kidneys.  What are tips for following this plan?  Reading food labels  · The Nutrition Facts label lists the amount of sodium in one serving of the food. If you eat more than one serving, you must multiply the listed amount of sodium by the number of servings.  · Choose foods with less than 140 mg of sodium per serving.  · Avoid foods with 300 mg of sodium or more per serving.  Shopping    · Look for lower-sodium products, often labeled as \"low-sodium\" or \"no salt added.\"  · Always check the sodium content, even if foods are labeled as \"unsalted\" or \"no salt added.\"  · Buy fresh foods.  ? Avoid canned foods and pre-made or frozen meals.  ? Avoid canned, cured, or processed meats.  · Buy breads that have less than 80 mg of sodium per slice.  Cooking    · Eat more home-cooked food and less restaurant, buffet, and fast food.  · Avoid adding salt when cooking. Use salt-free seasonings or herbs instead of table salt or sea salt. Check with your health care provider or pharmacist before using salt substitutes.  · Cook with plant-based oils, such as canola, sunflower, or olive oil.  Meal planning  · When eating at a restaurant, ask that your food be prepared with less salt or no salt, if possible. Avoid dishes labeled as brined, pickled, cured, smoked, or made with soy sauce, miso, or teriyaki sauce.  · Avoid foods that contain MSG (monosodium glutamate). MSG is sometimes added to Chinese food, bouillon, and some canned " "foods.  · Make meals that can be grilled, baked, poached, roasted, or steamed. These are generally made with less sodium.  General information  Most people on this plan should limit their sodium intake to 1,500-2,000 mg (milligrams) of sodium each day.  What foods should I eat?  Fruits  Fresh, frozen, or canned fruit. Fruit juice.  Vegetables  Fresh or frozen vegetables. \"No salt added\" canned vegetables. \"No salt added\" tomato sauce and paste. Low-sodium or reduced-sodium tomato and vegetable juice.  Grains  Low-sodium cereals, including oats, puffed wheat and rice, and shredded wheat. Low-sodium crackers. Unsalted rice. Unsalted pasta. Low-sodium bread. Whole-grain breads and whole-grain pasta.  Meats and other proteins  Fresh or frozen (no salt added) meat, poultry, seafood, and fish. Low-sodium canned tuna and salmon. Unsalted nuts. Dried peas, beans, and lentils without added salt. Unsalted canned beans. Eggs. Unsalted nut butters.  Dairy  Milk. Soy milk. Cheese that is naturally low in sodium, such as ricotta cheese, fresh mozzarella, or Swiss cheese. Low-sodium or reduced-sodium cheese. Cream cheese. Yogurt.  Seasonings and condiments  Fresh and dried herbs and spices. Salt-free seasonings. Low-sodium mustard and ketchup. Sodium-free salad dressing. Sodium-free light mayonnaise. Fresh or refrigerated horseradish. Lemon juice. Vinegar.  Other foods  Homemade, reduced-sodium, or low-sodium soups. Unsalted popcorn and pretzels. Low-salt or salt-free chips.  The items listed above may not be a complete list of foods and beverages you can eat. Contact a dietitian for more information.  What foods should I avoid?  Vegetables  Sauerkraut, pickled vegetables, and relishes. Olives. French fries. Onion rings. Regular canned vegetables (not low-sodium or reduced-sodium). Regular canned tomato sauce and paste (not low-sodium or reduced-sodium). Regular tomato and vegetable juice (not low-sodium or reduced-sodium). Frozen " vegetables in sauces.  Grains  Instant hot cereals. Bread stuffing, pancake, and biscuit mixes. Croutons. Seasoned rice or pasta mixes. Noodle soup cups. Boxed or frozen macaroni and cheese. Regular salted crackers. Self-rising flour.  Meats and other proteins  Meat or fish that is salted, canned, smoked, spiced, or pickled. Precooked or cured meat, such as sausages or meat loaves. Hawkins. Ham. Pepperoni. Hot dogs. Corned beef. Chipped beef. Salt pork. Jerky. Pickled herring. Anchovies and sardines. Regular canned tuna. Salted nuts.  Dairy  Processed cheese and cheese spreads. Hard cheeses. Cheese curds. Blue cheese. Feta cheese. String cheese. Regular cottage cheese. Buttermilk. Canned milk.  Fats and oils  Salted butter. Regular margarine. Ghee. Hawkins fat.  Seasonings and condiments  Onion salt, garlic salt, seasoned salt, table salt, and sea salt. Canned and packaged gravies. Worcestershire sauce. Tartar sauce. Barbecue sauce. Teriyaki sauce. Soy sauce, including reduced-sodium. Steak sauce. Fish sauce. Oyster sauce. Cocktail sauce. Horseradish that you find on the shelf. Regular ketchup and mustard. Meat flavorings and tenderizers. Bouillon cubes. Hot sauce. Pre-made or packaged marinades. Pre-made or packaged taco seasonings. Relishes. Regular salad dressings. Salsa.  Other foods  Salted popcorn and pretzels. Corn chips and puffs. Potato and tortilla chips. Canned or dried soups. Pizza. Frozen entrees and pot pies.  The items listed above may not be a complete list of foods and beverages you should avoid. Contact a dietitian for more information.  Summary  · Eating less sodium can help lower your blood pressure, reduce swelling, and protect your heart, liver, and kidneys.  · Most people on this plan should limit their sodium intake to 1,500-2,000 mg (milligrams) of sodium each day.  · Canned, boxed, and frozen foods are high in sodium. Restaurant foods, fast foods, and pizza are also very high in sodium. You  also get sodium by adding salt to food.  · Try to cook at home, eat more fresh fruits and vegetables, and eat less fast food and canned, processed, or prepared foods.  This information is not intended to replace advice given to you by your health care provider. Make sure you discuss any questions you have with your health care provider.  Document Revised: 01/22/2021 Document Reviewed: 11/18/2020  [employer] Patient Education © 2021 [employer]    Fluid Restriction  With some health conditions, you must restrict your fluid intake. This means that you need to limit the amount of fluid that you drink each day (fluid restriction). When you have a fluid restriction, you must carefully measure and keep track of the amount of fluid that you drink. Your health care provider will identify the specific amount of fluid you are allowed each day (fluid allowance). This amount may depend on several things, such as:  · How well your kidneys function.  · How much fluid you are keeping (retaining) in your body tissues.  · Your blood pressure.  · Your heart function.  · Your blood sodium level.  What is my plan?  Your health care provider recommends that you limit your fluid intake to TWO LITERS per day.    What counts toward my fluid intake?  Your fluid intake includes all liquids that you drink, as well as any foods that become liquid at room temperature.  The following are examples of some fluids that you will have to restrict:  · Tea, coffee, soda, lemonade, milk, water, juice, sports drinks, and nutritional supplement beverages.  · Alcoholic beverages.  · Cream.  · Gravy.  · Ice cubes.  · Soup and broth.  The following are examples of foods that become liquid at room temperature. These foods will also count toward your fluid intake.  · Ice cream and ice milk.  · Frozen yogurt and sherbet.  · Frozen ice pops.  · Flavored gelatin.  How do I keep track of my fluid intake?  Each morning, fill a jug with the amount of water that is  equal to your daily fluid allowance. You can use this water as a guideline for fluid allowance. Each time you take in any form of fluid (including ice cubes and foods that become liquid at room temperature), pour an equal amount of water out of the container. This helps you to see how much fluid you are taking in. It also helps you to see how much more fluid you can take in during the rest of the day.  The following conversions may also be helpful in measuring your fluid intake:  · 1 cup equals 8 oz (240 mL).  · ¾ cup equals 6 oz (180 mL).  · ? cup equals 5? oz (160 mL).  · ½ cup equals 4 oz (120 mL).  · ? cup equals 2? oz (80 mL).  · ¼ cup equals 2 oz (60 mL).  · 2 Tbsp equals 1 oz (30 mL).  What are tips for following this plan?  General instructions  · Make sure that you stay within your recommended fluid allowance each day. Always measure and keep track of your fluids (including ice cubes and foods that become liquid at room temperature).  · Use small cups and glasses and learn to sip fluids slowly.  · Try frozen fruits between meals, such as grapes or strawberries. These can satisfy thirst without adding to your fluid intake.  · Swallow your pills along with meals or soft foods such as applesauce or mashed potatoes, instead of with liquids. Doing this helps you to save your fluid allowance for something that you enjoy.  Weigh yourself each day         Weigh yourself every day. Keeping track of your daily weight can help you and your health care provider to notice as soon as possible if you are retaining too much fluid in your body.  · Follow this sequence every mornin. Urinate.  2. Weigh yourself.  3. Eat breakfast.  · Wear the same amount of clothing each time you weigh yourself.  · Write down your daily weight. Give this weight record to your health care provider. If your weight is going up, you may be retaining too much fluid. Every 1 lb (0.45 kg) of body weight that you gain is a sign that your body is  retaining 2 cups (480 mL) of fluid.    Manage your thirst  · Add lemon juice or a slice of fresh lemon to water or ice. Doing this helps to satisfy your thirst.  · Freeze fruit juice or water in an ice cube tray. Use this as part of your fluid allowance. These cubes are useful for quenching your thirst. Before you freeze the juice or water, measure how much liquid you use to fill a cube section of the ice tray. Subtract this amount from your day's allowance each time you consume a frozen cube.  · Avoid salty (high-sodium) foods. These foods make you thirsty and make it more difficult to stay within your daily fluid allowance.  · Keep the temperature in your home at a cooler level.  · Keep the air in your home as humid as possible. Dry air increases thirst.  · Avoid being out in the hot sun, which can cause you to sweat and become thirsty.  · To help avoid dry mouth, brush your teeth often or rinse out your mouth with mouthwash. Lemon wedges, hard sour candies, chewing gum, or breath spray may also help to moisten your mouth.  What are some signs that I may be taking in too much fluid?  You may be taking in too much fluid if:  · Your weight increases. Contact your health care provider if you gain weight rapidly.  · Your face, hands, legs, feet, and abdomen start to swell.  · You have trouble breathing.  Summary  · With some health conditions, you must limit (restrict) your fluid intake. This means that you need to limit the amount of fluid you drink each day (fluid restriction). Your health care provider will identify the specific amount of fluid that you are allowed each day.  · When you have a fluid restriction, you must carefully measure and keep track of the amount of fluid that you drink.  · Your fluid intake includes all liquids that you drink, as well as any foods that become liquid at room temperature (such as ice cream and gelatin).  · You may be taking in too much fluid if your weight increases, your body  starts to swell, or you have trouble breathing.  This information is not intended to replace advice given to you by your health care provider. Make sure you discuss any questions you have with your health care provider.  Document Revised: 04/09/2020 Document Reviewed: 08/22/2018  Elsevier Patient Education © 2021 [employer]

## 2021-06-21 NOTE — ADDENDUM NOTE
Encounter addended by: Eugenia Soria RN on: 6/21/2021 3:05 PM   Actions taken: Charge Capture section accepted

## 2021-06-21 NOTE — PROGRESS NOTES
[facility] HEART FAILURE      Patient Name: Denny Ivy  :8/3/1929  Age: 89 y.o.  Sex: female  Referring Provider: Chauncey Dyer MD   Encounter Provider: Chante Leblanc, PharmD, BCACP      Chief Complaint:   Chief Complaint   Patient presents with   • Congestive Heart Failure       HPI:  Patient presents with her granddaughter for her initial visit in the [facility]. PMH is significant for HFpEF (EF 65%), T2DM, HTN, hypothyroidism, and CKD stage 3. Patient was recently admitted for altered mental status and was discharged on 21. BP meds were held initially due to low blood pressures and COLLIN on CKD3. She then developed hypertensive urgency and her medications were added back and titrated up. Patient did have an episode of confusion while admitted and a rapid response was called. Neurology evaluated the patient and recommended to treat it as a TIA with 21 days of DAPT and then switch to  mg daily. Additionally, spironolactone was stopped during her inpatient stay by nephrology due to some hyperkalemia and renal function. Limited time spent with patient and granddaughter today due to patient having appt with PCP she had to get to. Patient has been doing well since hospitalization but her granddaughter reports she does feel like patient is foggy. She has some lower extremity edema but per granddaughter, she actually thinks this is better than when she was discharged. Home health and PT/OT have been coming to see patient since being home.      Current Regimen:  Heart Failure  Torsemide 20 mg daily  Metoprolol succinate 50 mg (3 tablets) nightly      Other CV Meds  DAPT with ASA 81 mg daily + clopidogrel 75 mg daily x 21 days (through 21) then switch to  mg daily  Atorvastatin 40 mg daily  Clonidine 0.1 mg bid  Diltiazem  mg daily  Hydralazine 100 mg tid  Isosorbide mononitrate 120 mg daily      Medication Use:  Adherence: good, granddaughter helps and she also has caregivers  Past hx of  "medication use/intolerance: spironolactone (hyperkalemia); pravastatin (switched to atorvastatin)  Affordability: Traditional Medicare      Diet:  Defer in-depth discussion to future visit. Granddaughter did ask about switching from half and half coffee to strictly decaf. Discussed switching to decaf could help control patient's BP      Social History:  Tobacco use: none  Exercise: limited by comorbid conditions  PT/OT coming out to home since discharge      Immunization Status:  Pneumococcal: defer discussion to future visit  Influenza: defer discussion to future visit       OBJECTIVE:    /82 (BP Location: Right arm, Patient Position: Sitting, Cuff Size: Large Adult)   Pulse 83   Resp 20   Ht 154.9 cm (60.98\")   Wt 82.6 kg (182 lb)   SpO2 95%   BMI 34.41 kg/m²     Body mass index is 34.41 kg/m².  Wt Readings from Last 1 Encounters:   06/21/21 82.6 kg (182 lb)       Lab Review:  Renal Function: Estimated Creatinine Clearance: 22.6 mL/min (A) (by C-G formula based on SCr of 1.58 mg/dL (H)).    Lab Results   Component Value Date    PROBNP 1,035.0 05/22/2021       Results for orders placed during the hospital encounter of 05/22/21    Adult Transthoracic Echo Complete W/ Cont if Necessary Per Protocol    Interpretation Summary  · Estimated right ventricular systolic pressure from tricuspid regurgitation is mildly elevated (35-45 mmHg). Calculated right ventricular systolic pressure from tricuspid regurgitation is 42 mmHg.  · Estimated left ventricular EF = 65% Left ventricular systolic function is normal.  · Left ventricular diastolic function was indeterminate.      ASSESSMENT/PLAN OF CARE:    1. HFpEF (EF 65%)  - Patient has some lower extremity edema today, but per granddaughter this is better than when she was discharged  - Continue torsemide 20 mg daily  - Continue metoprolol succinate 50 mg (3 tablets) nightly  - If needed based on her symptoms/labs, could increase loop diuretic dose  - Patient does " have allergies listed to multiple cardiac meds but allergy is listed as unknown. Could potentially retry one of them if needed for BP control  - Discussed switching to decaf coffee could improve BP. Will discuss in-depth diet education at future visit, but did discuss limiting fluids to 6-8 (8 oz) glasses of fluid daily   - Advised patient to call clinic with 2-3 lb weight gain in 24 hrs or >5 lb weight gain in 1 week     10 min spent in direct patient care      Thank you for allowing me to participate in the care of your patient,         Chante Leblanc [place]  [facility] HEART FAILURE  06/21/21  12:29 EDT

## 2021-06-22 ENCOUNTER — READMISSION MANAGEMENT (OUTPATIENT)
Dept: CALL CENTER | Facility: HOSPITAL | Age: 86
End: 2021-06-22

## 2021-06-22 ENCOUNTER — HOME CARE VISIT (OUTPATIENT)
Dept: HOME HEALTH SERVICES | Facility: HOME HEALTHCARE | Age: 86
End: 2021-06-22

## 2021-06-22 ENCOUNTER — TELEPHONE (OUTPATIENT)
Dept: CARDIOLOGY | Facility: CLINIC | Age: 86
End: 2021-06-22

## 2021-06-22 VITALS
HEART RATE: 78 BPM | SYSTOLIC BLOOD PRESSURE: 144 MMHG | OXYGEN SATURATION: 97 % | TEMPERATURE: 97.5 F | DIASTOLIC BLOOD PRESSURE: 90 MMHG

## 2021-06-22 PROCEDURE — G0151 HHCP-SERV OF PT,EA 15 MIN: HCPCS

## 2021-06-22 PROCEDURE — G0495 RN CARE TRAIN/EDU IN HH: HCPCS

## 2021-06-22 NOTE — HOME HEALTH
Patient reports she feels as if she didn't swallow her breakfast very well so she has a feeling as if something is stuck in her throat.  Patient denies issues with going to MD appointment yesterday.  Caregiver reports noticing some wheezing today.  Patient denies issues with shortness of breath.

## 2021-06-22 NOTE — OUTREACH NOTE
CHF Week 2 Survey      Responses   [facility] patient discharged from?  [place]   Does the patient have one of the following disease processes/diagnoses(primary or secondary)?  CHF   Week 2 attempt successful?  Yes   Call start time  1459   Call end time  1506   Discharge diagnosis  Generalized Weakness, Wt Loss, Anemia, CHF, HTH, COLLIN   Person spoke with today (if not patient) and relationship  corrie Rudd   Meds reviewed with patient/caregiver?  Yes   Is the patient taking all medications as directed (includes completed medication regime)?  Yes   Has the patient kept scheduled appointments due by today?  Yes   Comments  PCP Cardiology yesterday   What is the Home health agency?   [facility]   Has home health visited the patient within 72 hours of discharge?  Yes   What DME was ordered?  Suppose to wear CPAP at HS   Pulse Ox monitoring  None   Psychosocial issues?  No   Comments  Appetite improving.   203 This am 189   What is the patient's perception of their health status since discharge?  Improving   Is the patient weighing daily?  Yes   Is the patient able to teach back Heart Failure Zones?  Yes   CHF Week 2 call completed?  Yes          Moon Caro RN

## 2021-06-22 NOTE — TELEPHONE ENCOUNTER
Discussed with patient's daughter results of Chem-7.  Continue current medications.    Alejandro Barton MD PhD

## 2021-06-23 VITALS
SYSTOLIC BLOOD PRESSURE: 150 MMHG | BODY MASS INDEX: 34.37 KG/M2 | WEIGHT: 181.8 LBS | HEART RATE: 73 BPM | RESPIRATION RATE: 18 BRPM | OXYGEN SATURATION: 92 % | DIASTOLIC BLOOD PRESSURE: 72 MMHG

## 2021-06-24 ENCOUNTER — HOME CARE VISIT (OUTPATIENT)
Dept: HOME HEALTH SERVICES | Facility: HOME HEALTHCARE | Age: 86
End: 2021-06-24

## 2021-06-24 VITALS
HEART RATE: 72 BPM | DIASTOLIC BLOOD PRESSURE: 64 MMHG | SYSTOLIC BLOOD PRESSURE: 122 MMHG | OXYGEN SATURATION: 97 % | TEMPERATURE: 97.2 F

## 2021-06-24 PROCEDURE — G0158 HHC OT ASSISTANT EA 15: HCPCS

## 2021-06-24 PROCEDURE — G0152 HHCP-SERV OF OT,EA 15 MIN: HCPCS

## 2021-06-24 PROCEDURE — G0151 HHCP-SERV OF PT,EA 15 MIN: HCPCS

## 2021-06-24 NOTE — HOME HEALTH
Patient states she is doing okay today.  She was asleep but easily awakened when PT presented.  Nothing new to report from caregiver or patient.  She denies pain currently.

## 2021-06-25 ENCOUNTER — HOME CARE VISIT (OUTPATIENT)
Dept: HOME HEALTH SERVICES | Facility: HOME HEALTHCARE | Age: 86
End: 2021-06-25

## 2021-06-25 PROCEDURE — G0495 RN CARE TRAIN/EDU IN HH: HCPCS

## 2021-06-27 VITALS
HEART RATE: 72 BPM | BODY MASS INDEX: 34.03 KG/M2 | SYSTOLIC BLOOD PRESSURE: 140 MMHG | WEIGHT: 180 LBS | RESPIRATION RATE: 16 BRPM | OXYGEN SATURATION: 96 % | DIASTOLIC BLOOD PRESSURE: 72 MMHG

## 2021-06-28 ENCOUNTER — HOME CARE VISIT (OUTPATIENT)
Dept: HOME HEALTH SERVICES | Facility: HOME HEALTHCARE | Age: 86
End: 2021-06-28

## 2021-06-30 ENCOUNTER — APPOINTMENT (OUTPATIENT)
Dept: CARDIOLOGY | Facility: HOSPITAL | Age: 86
End: 2021-06-30

## 2021-06-30 ENCOUNTER — IMMUNIZATION (OUTPATIENT)
Dept: VACCINE CLINIC | Facility: HOSPITAL | Age: 86
End: 2021-06-30

## 2021-06-30 ENCOUNTER — TELEPHONE (OUTPATIENT)
Dept: CARDIOLOGY | Facility: HOSPITAL | Age: 86
End: 2021-06-30

## 2021-06-30 ENCOUNTER — HOME CARE VISIT (OUTPATIENT)
Dept: HOME HEALTH SERVICES | Facility: HOME HEALTHCARE | Age: 86
End: 2021-06-30

## 2021-06-30 ENCOUNTER — READMISSION MANAGEMENT (OUTPATIENT)
Dept: CALL CENTER | Facility: HOSPITAL | Age: 86
End: 2021-06-30

## 2021-06-30 ENCOUNTER — HOSPITAL ENCOUNTER (OUTPATIENT)
Dept: CARDIOLOGY | Facility: HOSPITAL | Age: 86
Discharge: HOME | End: 2021-06-30
Admitting: INTERNAL MEDICINE

## 2021-06-30 VITALS
HEIGHT: 61 IN | DIASTOLIC BLOOD PRESSURE: 82 MMHG | RESPIRATION RATE: 20 BRPM | OXYGEN SATURATION: 96 % | HEART RATE: 77 BPM | WEIGHT: 179 LBS | BODY MASS INDEX: 33.79 KG/M2 | SYSTOLIC BLOOD PRESSURE: 146 MMHG

## 2021-06-30 DIAGNOSIS — G47.33 OSA TREATED WITH BIPAP: ICD-10-CM

## 2021-06-30 DIAGNOSIS — I50.32 CHRONIC HEART FAILURE WITH PRESERVED EJECTION FRACTION (HCC): Primary | ICD-10-CM

## 2021-06-30 PROCEDURE — G0157 HHC PT ASSISTANT EA 15: HCPCS

## 2021-06-30 PROCEDURE — 0001A: CPT | Performed by: INTERNAL MEDICINE

## 2021-06-30 PROCEDURE — 91300 HC SARSCOV02 VAC 30MCG/0.3ML IM: CPT | Performed by: INTERNAL MEDICINE

## 2021-06-30 PROCEDURE — G0463 HOSPITAL OUTPT CLINIC VISIT: HCPCS

## 2021-06-30 PROCEDURE — 99214 OFFICE O/P EST MOD 30 MIN: CPT | Performed by: INTERNAL MEDICINE

## 2021-06-30 RX ORDER — METOPROLOL SUCCINATE 50 MG/1
50 TABLET, EXTENDED RELEASE ORAL NIGHTLY
Qty: 31 TABLET | Refills: 3 | Status: SHIPPED | OUTPATIENT
Start: 2021-06-30 | End: 2021-07-08 | Stop reason: SDUPTHER

## 2021-06-30 RX ORDER — ATORVASTATIN CALCIUM 40 MG/1
40 TABLET, FILM COATED ORAL NIGHTLY
Qty: 31 TABLET | Refills: 3 | Status: SHIPPED | OUTPATIENT
Start: 2021-06-30 | End: 2021-07-30

## 2021-06-30 NOTE — TELEPHONE ENCOUNTER
Pt left VM message this AM to confirm time of Pt's appt.  I called back, but no answer. Left VM message informing her that appt, is this morning at 11:30 AM.  Will await Pt call back.    Eugenia Vega RN  [facility] Heart Failure Clinic

## 2021-06-30 NOTE — OUTREACH NOTE
CHF Week 3 Survey      Responses   [facility] patient discharged from?  [place]   Does the patient have one of the following disease processes/diagnoses(primary or secondary)?  CHF   Week 3 attempt successful?  Yes   Call start time  1531   Call end time  1536   Discharge diagnosis  Generalized Weakness, Wt Loss, Anemia, CHF, HTH, COLLIN   List who call center can speak with  lorena dorantesdtr or caregiver   Person spoke with today (if not patient) and relationship  corrie Rudd   Meds reviewed with patient/caregiver?  Yes   Is the patient taking all medications as directed (includes completed medication regime)?  Yes   Has the patient kept scheduled appointments due by today?  Yes   What is the Home health agency?   [facility]   What DME was ordered?  Suppose to wear CPAP at HS   Pulse Ox monitoring  None   Psychosocial issues?  No   Comments  The pt's glucose monitor was not able to read this afternoon after her Covid vaccine. They have called the MD about this issue.    What is the patient's perception of their health status since discharge?  New symptoms unrelated to diagnosis   Is the patient weighing daily?  Yes   Is the patient able to teach back Heart Failure Zones?  Yes   CHF Week 3 call completed?  Yes          Riana Srinivasan RN

## 2021-06-30 NOTE — TELEPHONE ENCOUNTER
"Pt's granddaughter called with a couple of questions after today's clinic visit.  Dr. Barton stopped her isosorbide.  Pt's granddaughter wanted to make sure we know that prior to Pt's last hospitalization Pt was on 60 mg and it was increased to 120 mg in the hospital.  They are wanting to know if this makes any difference to whether or not to stop it.  The second question was about Pt's blood sugar.  Per granddaughter, they have tried checking  it several times this afternoon and per granddaughter Pt's blood is thin and will not \"suck up on the test strip\" .  Pt had her first COVID vaccine  today and she is wondering if this is a side effect of the COVID vaccine.  Advised Pt that I will let Dr. Barton know about the dose clarification and that I will call back if this changes decision to stop it.  As for Pt's blood sugar testing, I informed Pt that I checked with Chante our clinic pharmacist and she did not think this was a side effect of the vaccine.  I suggested that it could be a problem with the glucose machine as they were able to check. Pt's blood sugar this AM.  Advised Pt to wait a little bit and try to check again, maybe using a different finger and making sure alcohol has time to dry on finger before pricking it. Ask Pt to call back later today and let me know if they are able to get a reading. Understanding and agreement.    Eugenia Soria RN  [facility]  "

## 2021-07-01 VITALS
SYSTOLIC BLOOD PRESSURE: 133 MMHG | RESPIRATION RATE: 19 BRPM | HEART RATE: 69 BPM | DIASTOLIC BLOOD PRESSURE: 82 MMHG | OXYGEN SATURATION: 97 % | TEMPERATURE: 97.1 F

## 2021-07-01 NOTE — HOME HEALTH
Granddaughter and caregiver present for training today. Patient has F/U appt today that went well but patient report fatigue and today's session is limited before patient request for session to end.

## 2021-07-02 ENCOUNTER — HOME CARE VISIT (OUTPATIENT)
Dept: HOME HEALTH SERVICES | Facility: HOME HEALTHCARE | Age: 86
End: 2021-07-02

## 2021-07-02 PROCEDURE — G0495 RN CARE TRAIN/EDU IN HH: HCPCS

## 2021-07-02 PROCEDURE — G0152 HHCP-SERV OF OT,EA 15 MIN: HCPCS

## 2021-07-03 VITALS
OXYGEN SATURATION: 96 % | HEART RATE: 72 BPM | TEMPERATURE: 98.3 F | RESPIRATION RATE: 72 BRPM | SYSTOLIC BLOOD PRESSURE: 152 MMHG | DIASTOLIC BLOOD PRESSURE: 64 MMHG

## 2021-07-03 NOTE — HOME HEALTH
plan-assess blood sugars, edema ble, sn to continue to see pt.   pt son states lost the humalog sliding scale and is needing new one and blood sugars have been averaging in the 200's and on the day that pt received the covid vaccine it went up to 423  rn called dr osorio/endocrinologist/Jody andrews above and md states pt has not seen doctor since end of last year  but sliding scale insulin was  8 units at breakfast  7 units at lunch  4 units at supper. MED PROFILE UPDATED  pt to make appt with md asap  pt having 2+edema ble, enc elevate legs above heart several times a day. compression stockings were just applied before nurse arrived  no open wounds  t/i apply compression stockings in am and take off at night, s/s to report md park, safety fall precautions, low salt ncs diet  pt/son voices understanding of education with teach back    RN HAD CASE COMMINATION WITH  GRANDDAUGHTER JENNIFER AND WAS TELLING NURSE THAT THERE IS A BREAKDOWN IN COMMUNICTION BETWEEN DIFFERENT CAREGIVERS. RN WENT OVER CORRECT DOSE OF SLIDING SCALE INSULIN. JENNIFER WAS ALSO ASKING ABOUT PT GOALS WITH NURSING AND OT. RN WENT OVER NURSING GOALS. PT IS SUPPOSE TO START GRIEF COUNSELING WITH [facility] THE WEEK AFTER NEXT WHERE HER  WAS A PT WITH [facility] AND  IN MAY. GRANDDAUGHTER ALSO STATED SHE IS THE MAIN CG AND WOULD LIKE COMMUNICATION RE THE PT TO GO THROUGH HER      CASE COMMUNICATION WITH OLLIE/OT, RODY/RN, gabrielle FERMIN/PT

## 2021-07-05 VITALS
OXYGEN SATURATION: 96 % | DIASTOLIC BLOOD PRESSURE: 64 MMHG | RESPIRATION RATE: 17 BRPM | TEMPERATURE: 97.8 F | HEART RATE: 78 BPM | SYSTOLIC BLOOD PRESSURE: 132 MMHG

## 2021-07-05 NOTE — HOME HEALTH
"Subjective: (pt's son); \"I need to know the best place to put a portable ramp. The side entry or the front. I need to know what kind of bath tub bench to buy and know if you think it'll work.\""

## 2021-07-05 NOTE — CASE COMMUNICATION
The Physical Therapy visit  on 7/2/21  was missed due to failure to contact , therefore, the prescribed frequency of visits was not met.    If you have questions or would like further information about this patient, please contact us at [phone].    Regards,     Danita Barba PTA

## 2021-07-07 ENCOUNTER — HOME CARE VISIT (OUTPATIENT)
Dept: HOME HEALTH SERVICES | Facility: HOME HEALTHCARE | Age: 86
End: 2021-07-07

## 2021-07-07 VITALS
TEMPERATURE: 97.5 F | HEART RATE: 83 BPM | SYSTOLIC BLOOD PRESSURE: 136 MMHG | OXYGEN SATURATION: 98 % | DIASTOLIC BLOOD PRESSURE: 69 MMHG

## 2021-07-07 PROCEDURE — G0151 HHCP-SERV OF PT,EA 15 MIN: HCPCS

## 2021-07-07 PROCEDURE — G0495 RN CARE TRAIN/EDU IN HH: HCPCS

## 2021-07-07 NOTE — HOME HEALTH
Pt presented sitting in kitchen chair with caregiver present. Caregiver reports she has been sitting for 2 hours.   B LE edema (L foot > R foot).   She reports she has been doing her HEP and did this AM.  She declined to go outside to practice entering/exiting the home. Recommend 2 grab bars to be installed along kitchen step that leads to side door while waiting for ramp to be installed.

## 2021-07-08 ENCOUNTER — READMISSION MANAGEMENT (OUTPATIENT)
Dept: CALL CENTER | Facility: HOSPITAL | Age: 86
End: 2021-07-08

## 2021-07-08 ENCOUNTER — TELEPHONE (OUTPATIENT)
Dept: CARDIOLOGY | Facility: HOSPITAL | Age: 86
End: 2021-07-08

## 2021-07-08 ENCOUNTER — OFFICE VISIT (OUTPATIENT)
Dept: CARDIOLOGY | Facility: CLINIC | Age: 86
End: 2021-07-08

## 2021-07-08 VITALS
SYSTOLIC BLOOD PRESSURE: 140 MMHG | HEIGHT: 61 IN | DIASTOLIC BLOOD PRESSURE: 64 MMHG | WEIGHT: 179 LBS | HEART RATE: 79 BPM | BODY MASS INDEX: 33.79 KG/M2

## 2021-07-08 DIAGNOSIS — G45.9 TIA (TRANSIENT ISCHEMIC ATTACK): ICD-10-CM

## 2021-07-08 DIAGNOSIS — I50.32 CHRONIC HEART FAILURE WITH PRESERVED EJECTION FRACTION (HCC): Primary | ICD-10-CM

## 2021-07-08 PROCEDURE — 93000 ELECTROCARDIOGRAM COMPLETE: CPT | Performed by: NURSE PRACTITIONER

## 2021-07-08 PROCEDURE — 99214 OFFICE O/P EST MOD 30 MIN: CPT | Performed by: NURSE PRACTITIONER

## 2021-07-08 RX ORDER — METOPROLOL SUCCINATE 50 MG/1
50 TABLET, EXTENDED RELEASE ORAL NIGHTLY
Qty: 90 TABLET | Refills: 2 | Status: SHIPPED | OUTPATIENT
Start: 2021-07-08 | End: 2021-11-09

## 2021-07-08 RX ORDER — DILTIAZEM HYDROCHLORIDE 300 MG/1
300 CAPSULE, COATED, EXTENDED RELEASE ORAL
Qty: 90 CAPSULE | Refills: 2 | Status: SHIPPED | OUTPATIENT
Start: 2021-07-08 | End: 2022-04-15

## 2021-07-08 RX ORDER — CLONIDINE HYDROCHLORIDE 0.1 MG/1
0.1 TABLET ORAL EVERY 12 HOURS SCHEDULED
Qty: 180 TABLET | Refills: 2 | Status: SHIPPED | OUTPATIENT
Start: 2021-07-08 | End: 2021-08-07

## 2021-07-08 NOTE — PROGRESS NOTES
Date of Office Visit: 21  Encounter Provider: DENNY Talavera  Place of Service: [facility] CARDIOLOGY  Patient Name: Denny Ivy  :8/3/1929    Chief Complaint   Patient presents with   • Congestive Heart Failure   • Hypertension   • Follow-up   :     HPI: Denny Ivy is a 89 y.o. female  with history of hypertension, hypothyroidism, diabetes mellitus, COPD, chronic diastolic congestive heart failure, and chronic kidney disease.    She was previously followed by Dr. Arias.  She is now followed by Dr. Marsh.  I will visit her for the first time and have reviewed her medical record    She was noted to have mild bilateral carotid artery stenosis on carotid duplex in .  She then had issues with congestive heart failure and was admitted.  She had fluid overload and her diuretics were adjusted and then she presented back to the emergency room with confusion.  She was not eating or drinking.  She had an echo May 2021 which showed normal left ventricular systolic function and mildly elevated RVSP.  There was no significant valvular disease.  She was found to have an acute kidney injury CKD 3.  She developed hypertensive urgency after diuretics and blood pressure medication were held. She had a normal bilateral renal artery duplex.  She has had increased confusion in 6/3/2021 leading to a rapid response.  MRI of the brain was relatively normal without acute pathology.  Some thyroid cysts were incidentally noted.  Neurology recommended treating it as a TIA with 21 days of dual antiplatelet therapy followed by full dose aspirin 325 daily indefinitely.  Her pravastatin was changed to atorvastatin.  Thyroid function was normal.  Creatinine at time of discharge is 1.58.  She is referred to ambulatory heart failure clinic.    She is accompanied today by her granddaughter.  Patient has been stable.  She has some lower extremity swelling more prominent in the left leg  "which is much improved since prior to admission.  She is in physical therapy.  She had normal BNP and creatinine was 1.32 with potassium 4.5 and GFR 46 approximately 3 weeks ago.  She needs BiPAP.    Allergies   Allergen Reactions   • Vasotec [Enalapril] Anaphylaxis   • Candesartan Cilexetil Unknown - Low Severity   • Atacand Hct [Candesartan Cilexetil-Hctz]      Pt does not know reaction   • Calan [Verapamil]      Pt does not know reaction   • Glucophage [Metformin Hcl]      Pt does not know reaction   • Hytrin [Terazosin]      Pt does not know reaction   • Metolazone      Pt does not know reaction   • Norvasc [Amlodipine Besylate]      Pt does not know reaction   • Sulfa Antibiotics Unknown - High Severity   • Thiazide-Type Diuretics      Pt does not know reaction           Family and social history reviewed.     ROS  All other systems were reviewed and are negative          Objective:     Vitals:    07/08/21 1353   BP: 140/64   BP Location: Left arm   Patient Position: Sitting   Pulse: 79   Weight: 81.2 kg (179 lb)   Height: 154.9 cm (61\")     Body mass index is 33.82 kg/m².    PHYSICAL EXAM:  Cardiovascular:      Murmurs: There is a grade 1/6 systolic murmur at the URSB.   Edema:     Pretibial: 1+ edema of the left pretibial area and trace edema of the right pretibial area.          ECG 12 Lead    Date/Time: 7/8/2021 5:00 PM  Performed by: Shelli Sutton APRN  Authorized by: Shelli Sutton APRN   Comparison: compared with previous ECG   Similar to previous ECG  Rhythm: sinus rhythm  Rate: normal  Conduction: right bundle branch block  Q waves: III and aVF                  Current Outpatient Medications   Medication Sig Dispense Refill   • aspirin  MG tablet Take 1 tablet by mouth Daily for 30 days. 30 tablet 0   • atorvastatin (LIPITOR) 40 MG tablet Take 1 tablet by mouth Every Night for 30 days. 31 tablet 3   • buPROPion (WELLBUTRIN) 75 MG tablet Take 75 mg by mouth 2 (two) times a day.     • " cholecalciferol (VITAMIN D3) 25 MCG (1000 UT) tablet Take 1 tablet by mouth Daily for 30 days. 30 tablet 0   • cloNIDine (CATAPRES) 0.1 MG tablet Take 1 tablet by mouth Every 12 (Twelve) Hours for 30 days. 180 tablet 2   • dilTIAZem CD (CARDIZEM CD) 300 MG 24 hr capsule Take 1 capsule by mouth Daily. 90 capsule 2   • glucose blood (Accu-Chek Harriet Plus) test strip CBS three times daily     • hydrALAZINE (APRESOLINE) 100 MG tablet Take 1 tablet by mouth 3 (Three) Times a Day. 90 tablet 0   • HYDROcodone-acetaminophen (NORCO) 5-325 MG per tablet Take 1 tablet by mouth 4 (four) times a day as needed for severe pain (7-10). 12 tablet 0   • insulin glargine (LANTUS) 100 UNIT/ML injection Inject 17 Units under the skin into the appropriate area as directed Every Night.     • insulin lispro (HumaLOG) 100 UNIT/ML injection Inject  under the skin into the appropriate area as directed 3 (Three) Times a Day Before Meals. Sliding scale-  8 units at breakfast  7 units at lunch  4 units at supper       • Insulin Pen Needle (B-D UF III MINI PEN NEEDLES) 31G X 5 MM misc 400 each by Other route.     • metoprolol succinate XL (TOPROL-XL) 50 MG 24 hr tablet Take 1 tablet by mouth Every Night for 124 days. 90 tablet 2   • torsemide (DEMADEX) 20 MG tablet Take 1 tablet by mouth Daily for 30 days. 30 tablet 0     No current facility-administered medications for this visit.     Assessment:       Diagnosis Plan   1. Chronic heart failure with preserved ejection fraction (CMS/HCC)     2. TIA (transient ischemic attack)          Orders Placed This Encounter   Procedures   • ECG 12 Lead     This order was created via procedure documentation     Order Specific Question:   Release to patient     Answer:   Immediate         Plan:       1.  89-year-old female with chronic diastolic congestive heart failure, preserved left ventricular systolic function on echo May 2021.  She is stable on current regimen.  She had normal BNP just 3 weeks ago.  -  follows with our heart failure clinic   2.  Hypertension blood pressure stable for her age  3.  Hyperlipidemia continue atorvastatin  Refilled  4.  Chronic kidney disease stage III last creatinine 1.32 with GFR 46 3 weeks ago with normal potassium  5.  TIA June 2021 was treated with aspirin 81 mg and Plavix for 21 days and is now on monotherapy with aspirin 325 mg daily indefinitely. She is to see neurology in a couple months  6.Diabetes on insulin  7. COPD  8. PRABHU treated with BiPAP  9. Chronic right bundle branch block      Keep follow-up in our heart failure clinic otherwise follow-up with Dr. Marsh in 6 months.            It has been a pleasure to participate in this patient's care.      Thank you,  DENNY Talavera      **I used Dragon to dictate this note:**

## 2021-07-08 NOTE — TELEPHONE ENCOUNTER
Pt 's granddaughter called for refills on several medications that Pt was prescribed during her last hospitalization: Clonidine, diltiazem and cholecalciferol.  Pt is out of these meds and is not sure how to get them refilled as they were filled at the [facility] retail pharmacy when Pt discharged from the hospital.  I told Pt's granddaughter I will ask our clinic physician about refills or find out who the refill requests need to go to.  Understanding and agreement verbalized.    Eugenia Soria RN  [facility]      Addendum:  Called Pt's granddaughter.  No answer. Left VM message advising  Pt that per clinic MD, these refills will need to be submitted to her PCP.  Pt's PCP is Мария Wilks.

## 2021-07-08 NOTE — OUTREACH NOTE
CHF Week 4 Survey      Responses   [facility] patient discharged from?  [place]   Does the patient have one of the following disease processes/diagnoses(primary or secondary)?  CHF   Week 4 attempt successful?  No   Rescheduled  Revoked          Justa Cruz RN

## 2021-07-09 ENCOUNTER — HOME CARE VISIT (OUTPATIENT)
Dept: HOME HEALTH SERVICES | Facility: HOME HEALTHCARE | Age: 86
End: 2021-07-09

## 2021-07-09 PROCEDURE — G0152 HHCP-SERV OF OT,EA 15 MIN: HCPCS

## 2021-07-11 VITALS
TEMPERATURE: 97.5 F | OXYGEN SATURATION: 98 % | DIASTOLIC BLOOD PRESSURE: 70 MMHG | HEART RATE: 85 BPM | SYSTOLIC BLOOD PRESSURE: 134 MMHG
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
## 2021-07-13 ENCOUNTER — HOME CARE VISIT (OUTPATIENT)
Dept: HOME HEALTH SERVICES | Facility: HOME HEALTHCARE | Age: 86
End: 2021-07-13

## 2021-07-13 VITALS
DIASTOLIC BLOOD PRESSURE: 88 MMHG | RESPIRATION RATE: 18 BRPM | OXYGEN SATURATION: 95 % | SYSTOLIC BLOOD PRESSURE: 158 MMHG | HEART RATE: 72 BPM

## 2021-07-15 ENCOUNTER — HOME CARE VISIT (OUTPATIENT)
Dept: HOME HEALTH SERVICES | Facility: HOME HEALTHCARE | Age: 86
End: 2021-07-15

## 2021-07-15 PROCEDURE — G0495 RN CARE TRAIN/EDU IN HH: HCPCS

## 2021-07-16 ENCOUNTER — HOME CARE VISIT (OUTPATIENT)
Dept: HOME HEALTH SERVICES | Facility: HOME HEALTHCARE | Age: 86
End: 2021-07-16

## 2021-07-16 VITALS — HEART RATE: 68 BPM | DIASTOLIC BLOOD PRESSURE: 72 MMHG | SYSTOLIC BLOOD PRESSURE: 134 MMHG

## 2021-07-16 VITALS
HEART RATE: 72 BPM | TEMPERATURE: 97.4 F | SYSTOLIC BLOOD PRESSURE: 186 MMHG | OXYGEN SATURATION: 97 % | RESPIRATION RATE: 18 BRPM | DIASTOLIC BLOOD PRESSURE: 86 MMHG

## 2021-07-16 NOTE — HOME HEALTH
Subjective: agreeable to OT d/c  No transfer tub bench purchased since last visit. CG could not find one that she liked. She is wanting the style that has the sliding seat on rails. She will order it online and follow the directions on assembly and use that come w/it. At end of tx session today, pt's BP was coming down; 170/80 (autocuff)

## 2021-07-21 ENCOUNTER — OFFICE VISIT (OUTPATIENT)
Dept: SLEEP MEDICINE | Facility: HOSPITAL | Age: 86
End: 2021-07-21

## 2021-07-21 ENCOUNTER — IMMUNIZATION (OUTPATIENT)
Dept: VACCINE CLINIC | Facility: HOSPITAL | Age: 86
End: 2021-07-21

## 2021-07-21 VITALS — HEIGHT: 62 IN | BODY MASS INDEX: 32.39 KG/M2 | WEIGHT: 176 LBS

## 2021-07-21 DIAGNOSIS — G47.33 OSA TREATED WITH BIPAP: ICD-10-CM

## 2021-07-21 DIAGNOSIS — G47.14 HYPERSOMNIA DUE TO MEDICAL CONDITION: Primary | ICD-10-CM

## 2021-07-21 PROCEDURE — 91300 HC SARSCOV02 VAC 30MCG/0.3ML IM: CPT | Performed by: INTERNAL MEDICINE

## 2021-07-21 PROCEDURE — 0002A: CPT | Performed by: INTERNAL MEDICINE

## 2021-07-21 PROCEDURE — 99214 OFFICE O/P EST MOD 30 MIN: CPT | Performed by: INTERNAL MEDICINE

## 2021-07-21 PROCEDURE — G0463 HOSPITAL OUTPT CLINIC VISIT: HCPCS

## 2021-07-21 NOTE — PROGRESS NOTES
Follow Up Sleep Disorders Center Note     Chief Complaint:  PRABHU     Primary Care Physician: Мария Wilks MD    Interval History:   The patient is a 89 y.o. female  who I last saw 1/2/2019 and that note was reviewed.  The patient has obstructive sleep apnea treated with auto BiPAP.  The patient was hospitalized and discharged 5/28/2021 and 6/8/2021.  The first admission was for acute on chronic diastolic congestive heart failure.  The second admission was due to low blood pressure with acute kidney injury on chronic kidney disease stage III.  Prior to her hospitalization, the patient had been doing well on BiPAP.  During the time of her hospitalizations, she was not using her device quite as regularly.  The patient is seen today because her auto BiPAP stopped working and had an error message.    The patient goes to bed around midnight and her wake time varies.  With treatment of her heart failure, and since I last saw her, her weight has decreased from 192 pounds down to 176 pounds.    Self-administered Adrian Sleepiness Scale test results: 15-16  0-5 Lower normal daytime sleepiness  6-10 Higher normal daytime sleepiness  11-12 Mild, 13-15 Moderate, & 16-24 Severe excessive daytime sleepiness    Review of Systems:    A complete review of systems was done and all were negative with the exception of shortness of breath and occasional wheezing and cough, the patient is here in a wheelchair    Social History:    Social History     Socioeconomic History   • Marital status:      Spouse name: Not on file   • Number of children: Not on file   • Years of education: Not on file   • Highest education level: Not on file   Tobacco Use   • Smoking status: Never Smoker   • Smokeless tobacco: Never Used   • Tobacco comment: caffeine - 2-4 cups daily    Substance and Sexual Activity   • Alcohol use: No   • Drug use: No   • Sexual activity: Defer       Allergies:  Vasotec [enalapril], Candesartan cilexetil, Atacand hct  "[candesartan cilexetil-hctz], Calan [verapamil], Glucophage [metformin hcl], Hytrin [terazosin], Metolazone, Norvasc [amlodipine besylate], Sulfa antibiotics, and Thiazide-type diuretics     Medication Review:  Reviewed.      Vital Signs:    Vitals:    07/21/21 1100   Weight: 79.8 kg (176 lb)   Height: 157.5 cm (62.01\")     Body mass index is 32.18 kg/m².    Physical Exam:    Constitutional:  Well developed 89 y.o. female that appears in no apparent distress.  Awake & oriented times 3.  Normal mood with normal recent and remote memory and normal judgement.  Eyes:  Conjunctivae normal.  Oropharynx: Previously, moist mucous membranes without exudate and a large tongue and normal uvula and class II-3 Mallampati airway, patient is wearing a facemask.     Downloaded PAP Data Reviewed For Compliance:  DME is [facility] and she uses a nasal interface.  Downloads between 4/17 and 7/15/2021 compliance 90%.  Average usage is 6 hours and 54 minutes.  Average AHI is 14.2 but the patient does have a large leak of 5 hours and 26 minutes.  Average auto BiPAP pressure is IPAP of 12 and EPAP of 10.    I have reviewed the above results and compared them with the patient's last downloads and reviewed with the patient.  When compared to previous downloads, the patient leak is worse.    Impression:   Obstructive sleep apnea adequately treated with auto BiPAP, however, her device stopped working with an error message. The patient appears to be at goal with good compliance and usage. The patient has persistent complaints of hypersomnolence.    Plan:  Good sleep hygiene measures should be maintained.  Weight loss would be beneficial in this patient who is obese by BMI.      After evaluating the patient and assessing results available, the patient is benefiting from the treatment being provided.     Due to the patient's multiple comorbidities outlined above, chronic diastolic congestive heart failure and chronic kidney disease, not treating " her with auto BiPAP increases her risk of morbidity.  Additionally, she has an increased risk even when using auto BiPAP due to her large leak that needs to be corrected.    The patient will continue auto BiPAP.  After clinical evaluation and review of downloads, I recommend changes to the patient's pressures.  Max IPAP will be decreased down to 15 and minimum EPAP will be decreased to 7 due to weight loss.  A new prescription will be sent to the patient's DME to repair or replace or provide a loaner for her device that is no longer functioning.    I answered all of the patient's questions.  The patient will call for any problems and will follow up in 3 months.      Carlos Eduardo Park MD  Sleep Medicine  07/21/21  12:05 EDT

## 2021-07-22 RX ORDER — HYDRALAZINE HYDROCHLORIDE 100 MG/1
100 TABLET, FILM COATED ORAL 3 TIMES DAILY
Qty: 30 TABLET | Refills: 3 | Status: SHIPPED | OUTPATIENT
Start: 2021-07-22

## 2021-07-22 NOTE — TELEPHONE ENCOUNTER
Pt's granddaughter Smitha called to request refill of Pt's hydralazine. Pt takes 100 mg TID and is out.  Advised Pt that I will submit refill request.  Would like Rx sent to [facility] pharmacy. Understanding and agreement verbalized.    Eugenia Soria RN  [facility]

## 2021-07-30 ENCOUNTER — TELEPHONE (OUTPATIENT)
Dept: SLEEP MEDICINE | Facility: HOSPITAL | Age: 86
End: 2021-07-30

## 2021-08-10 ENCOUNTER — TELEPHONE (OUTPATIENT)
Dept: CARDIOLOGY | Facility: HOSPITAL | Age: 86
End: 2021-08-10

## 2021-08-10 NOTE — TELEPHONE ENCOUNTER
Pt's caregiver Amisha called to see if it was ok for Pt to take tylenol for her hip and leg pain.  Pt's granddaughter Smitha called and let us know the caregiver would call us about the Tylenol to let us know what dose she had at home.  Advised Amisha that Pt can have up to 3000 mg of tylenol daily. Amisha said that Pt is having a lot of leg and hip pain and is having trouble getting comfortable.  Advised her to call Pt's PCP about Pt's increased leg/hip pain.  understanding and agreement verbalized.    Eugenia Soria RN  [facility]

## 2021-08-16 ENCOUNTER — APPOINTMENT (OUTPATIENT)
Dept: CARDIOLOGY | Facility: HOSPITAL | Age: 86
End: 2021-08-16

## 2021-08-30 ENCOUNTER — HOSPITAL ENCOUNTER (OUTPATIENT)
Dept: CARDIOLOGY | Facility: HOSPITAL | Age: 86
Discharge: HOME | End: 2021-08-30
Admitting: INTERNAL MEDICINE

## 2021-08-30 VITALS
WEIGHT: 176.2 LBS | DIASTOLIC BLOOD PRESSURE: 94 MMHG | OXYGEN SATURATION: 94 % | HEART RATE: 82 BPM | HEIGHT: 62 IN | BODY MASS INDEX: 32.42 KG/M2 | SYSTOLIC BLOOD PRESSURE: 162 MMHG

## 2021-08-30 DIAGNOSIS — I50.32 CHRONIC HEART FAILURE WITH PRESERVED EJECTION FRACTION (HCC): ICD-10-CM

## 2021-08-30 DIAGNOSIS — G47.33 OSA TREATED WITH BIPAP: ICD-10-CM

## 2021-08-30 DIAGNOSIS — I27.20 PULMONARY HYPERTENSION (HCC): Primary | ICD-10-CM

## 2021-08-30 PROCEDURE — G0463 HOSPITAL OUTPT CLINIC VISIT: HCPCS

## 2021-08-30 PROCEDURE — 99214 OFFICE O/P EST MOD 30 MIN: CPT | Performed by: INTERNAL MEDICINE

## 2021-08-30 RX ORDER — TORSEMIDE 20 MG/1
40 TABLET ORAL DAILY
COMMUNITY

## 2021-08-30 RX ORDER — ASPIRIN 81 MG/1
81 TABLET ORAL DAILY
Qty: 31 TABLET | Refills: 0
Start: 2021-08-30

## 2021-08-30 RX ORDER — ATORVASTATIN CALCIUM 40 MG/1
40 TABLET, FILM COATED ORAL DAILY
Qty: 31 TABLET | Refills: 0
Start: 2021-08-30 | End: 2022-01-19 | Stop reason: SDUPTHER

## 2021-08-30 NOTE — PROGRESS NOTES
Heart Failure & Pulmonary Arterial Hypertension  Alejandro Barton M.D., Ph.D., [facility]       Mindi Vazquez, APRN  [address] [facility] [address] [address]    Thank you for asking me to see Denny Ivy for HF.     History of Present Illness  This is a 89 y.o. female with:    This is a 89 y.o. female with:     1.  HFpEF  2. PRABHU  3. CKD     Denny Ivy is a 89 y.o. female who returns to the clinic today.  The patient is typically seen by Мария Wilks MD. The patient has a chief complaint of CHF.     As a matter of history, the following was reviewed and updated today.     Patient was originally admitted to [facility] on May 22, 2021 with ADHF, as well as some chest pain complaints.  2D TTE showed preserved biventricular function with indeterminate diastolic function.  There was mild left ventricular hypertrophy and LAE.  Patient's presentation was also complicated by CKD.  She was diagnosed with HFpEF.  She underwent IV diuresis and medication adjustments for her hypertension.  She presented back to the emergency department on May 30, 2021 with complaints of fatigue.  She was found to be in COLLIN and slight hyperkalemia.  She was admitted to the hospitalist service.  She received IV fluids and down-titration of some of her anti-hypertensive regimen.  Hospital course was then complicated by hypervolemia and elevated blood pressures.  She required ongoing medication adjustments.  Nephrology was also seeing the patient.  Of note, Aldactone was attempted while the patient was inpatient but she had hyperkalemia with a potassium of 5.6 with a GFR of 37.  Nephrology recommend discontinuation of her Aldactone.  Creatinine was 1.58 upon discharge.  Additionally, her hospitalization was complicated by an episode of confusion.  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  Brain MRI was performed.  CTA head neck was also performed without significant intracranial pathology or evidence of atherosclerosis.   Neurology recommended to the hospitalist service that she be treated as if she had a TIA with 21 days of DAPT followed by 3 and 25 mg aspirin indefinitely.  Pravastatin was also changed to atorvastatin 40 mg    She was seen in the heart failure clinic initially after an admission for ADHF.  She had improved edema and dyspnea.  Her current regimen includes Toprol-XL at 50 mg and torsemide.  She returns to clinic today.  Stable exertional dyspnea.  Stable edema.  No dizziness, lightheadedness or syncope.  No resting, exertional or nocturnal angina. Since her last visit, she reports she has seen nephrology. They went up on her Torsemide to 40mg a day. She is supposed to get labs this week. She did not take any medications prior to her appt. They report her SBP has 140s.        Review of Systems - Review of Systems   Constitutional: Negative.   Cardiovascular: Positive for dyspnea on exertion and leg swelling. Negative for chest pain, claudication, cyanosis, irregular heartbeat, near-syncope, orthopnea, palpitations, paroxysmal nocturnal dyspnea and syncope.   Respiratory: Positive for shortness of breath. Negative for cough, hemoptysis, sleep disturbances due to breathing, snoring, sputum production and wheezing.         All other systems were reviewed and were negative.    family history includes Diabetes in her son; Hypertension in her daughter, son, and son.     reports that she has never smoked. She has never used smokeless tobacco. She reports that she does not drink alcohol and does not use drugs.    Allergies   Allergen Reactions   • Vasotec [Enalapril] Anaphylaxis   • Candesartan Cilexetil Unknown - Low Severity   • Atacand Hct [Candesartan Cilexetil-Hctz]      Pt does not know reaction   • Calan [Verapamil]      Pt does not know reaction   • Glucophage [Metformin Hcl]      Pt does not know reaction   • Hytrin [Terazosin]      Pt does not know reaction   • Metolazone      Pt does not know reaction   • Norvasc  [Amlodipine Besylate]      Pt does not know reaction   • Sulfa Antibiotics Unknown - High Severity   • Thiazide-Type Diuretics      Pt does not know reaction         Current Outpatient Medications:   •  buPROPion (WELLBUTRIN) 75 MG tablet, Take 75 mg by mouth 2 (two) times a day., Disp: , Rfl:   •  dilTIAZem CD (CARDIZEM CD) 300 MG 24 hr capsule, Take 1 capsule by mouth Daily., Disp: 90 capsule, Rfl: 2  •  glucose blood (Accu-Chek Harriet Plus) test strip, CBS three times daily, Disp: , Rfl:   •  hydrALAZINE (APRESOLINE) 100 MG tablet, Take 1 tablet by mouth 3 (Three) Times a Day., Disp: 30 tablet, Rfl: 3  •  insulin glargine (LANTUS) 100 UNIT/ML injection, Inject 17 Units under the skin into the appropriate area as directed Every Night., Disp: , Rfl:   •  insulin lispro (HumaLOG) 100 UNIT/ML injection, Inject  under the skin into the appropriate area as directed 3 (Three) Times a Day Before Meals. Sliding scale- 8 units at breakfast 7 units at lunch 4 units at supper , Disp: , Rfl:   •  Insulin Pen Needle (B-D UF III MINI PEN NEEDLES) 31G X 5 MM misc, 400 each by Other route., Disp: , Rfl:   •  metoprolol succinate XL (TOPROL-XL) 50 MG 24 hr tablet, Take 1 tablet by mouth Every Night for 124 days., Disp: 90 tablet, Rfl: 2  •  torsemide (DEMADEX) 20 MG tablet, Take 40 mg by mouth Daily., Disp: , Rfl:   •  cloNIDine (CATAPRES) 0.1 MG tablet, Take 1 tablet by mouth Every 12 (Twelve) Hours for 30 days., Disp: 180 tablet, Rfl: 2  •  HYDROcodone-acetaminophen (NORCO) 5-325 MG per tablet, Take 1 tablet by mouth 4 (four) times a day as needed for severe pain (7-10)., Disp: 12 tablet, Rfl: 0      Physical Exam:  I have reviewed the patient's current vital signs as documented in the patient's EMR.   Vitals:    08/30/21 1155   BP: 162/94   Pulse: 82   SpO2: 94%     Body mass index is 32.22 kg/m².   Pulse Pressure: high  Pulse Ox: Normal  on room air  General: alert, appears stated age, cooperative and Seated in wheelchair      Body Habitus: obese    HEENT: Head: Normocephalic, no lesions, without obvious abnormality. No arcus senilis, xanthelasma or xanthomas.  No malar flush, proptosis, xanthomas or malar flush.   Neuro: alert, oriented x3  speech normal in context and clarity  memory intact grossly  Pulses: 2+ and symmetric  JVP: Volume/Pulsation: Normal.  Normal x and y descent.  Waveforms: Normal waveforms with a, x, and v waves.   Physiology: Appropriate inspiratory decrease.  No Kussmaul's. No Domingo's.     Pulmonary:Normal     Precordium: Normal impulses. P2 is not palpable.  RV Heave: absent  LV Heave: absent  [place]:  normal size and placement  Palpable S4: absent.  Heart rate: normal    Heart Rhythm: regular     Heart Sounds: S1: normal intensity  S2: normal intensity  S3: absent   S4: absent  Opening Snap: absent    A2-OS:  no  Pericardial Rub:  Absent: Yes  Ejection click: None      Murmurs:  present    Murmur 1 Type: systolic  Grade: 1/6    Timing: early systolic  Location:   LSB   Description:ejection  Radiation:  nonradiating  Extremity: moves all extremities equally.       DATA REVIEWED:       TTE/LUCAS:  Results for orders placed during the hospital encounter of 05/22/21    Adult Transthoracic Echo Complete W/ Cont if Necessary Per Protocol    Interpretation Summary  · Estimated right ventricular systolic pressure from tricuspid regurgitation is mildly elevated (35-45 mmHg). Calculated right ventricular systolic pressure from tricuspid regurgitation is 42 mmHg.  · Estimated left ventricular EF = 65% Left ventricular systolic function is normal.  · Left ventricular diastolic function was indeterminate.      My interpretation of the TTE is below.     LVEF is 65%.  Pulmonary hypertension.      Laboratory evaluations:    Lab Results   Component Value Date    GLUCOSE 241 (H) 06/21/2021    BUN 37 (H) 06/21/2021    CREATININE 1.32 (H) 06/21/2021    EGFRIFAFRI 46 (L) 06/21/2021    BCR 28.0 (H) 06/21/2021    K 4.5 06/21/2021     CO2 28.3 06/21/2021    CALCIUM 9.7 (H) 06/21/2021    ALBUMIN 3.20 (L) 06/08/2021    AST 12 06/01/2021    ALT 20 06/01/2021     Lab Results   Component Value Date    WBC 5.51 06/08/2021    HGB 10.4 (L) 06/08/2021    HCT 31.3 (L) 06/08/2021    MCV 92.3 06/08/2021     06/08/2021     Lab Results   Component Value Date    CHOL 133 06/04/2021    TRIG 69 06/04/2021    HDL 55 06/04/2021    LDL 64 06/04/2021     Lab Results   Component Value Date    TSH 2.200 06/01/2021     Lab Results   Component Value Date    TROPONINT 0.015 05/22/2021     Lab Results   Component Value Date    HGBA1C 6.36 (H) 05/31/2021     No results found for: DDIMER  Lab Results   Component Value Date    ALT 20 06/01/2021     Lab Results   Component Value Date    HGBA1C 6.36 (H) 05/31/2021    HGBA1C 6.22 (H) 05/23/2021     Lab Results   Component Value Date    CREATININE 1.32 (H) 06/21/2021     Lab Results   Component Value Date    IRON 79 06/01/2021    TIBC 289 (L) 06/01/2021    FERRITIN 64.20 06/01/2021     Lab Results   Component Value Date    INR 1.01 05/22/2021    INR 1.07 07/06/2018    PROTIME 13.1 05/22/2021    PROTIME 13.7 07/06/2018       Assessment/Plan     1. Pulmonary hypertension (CMS/HCC). PAH/PVH. WHO Group 2/3; FC: III.  RV status: Normal.      The patient's PA pressures are in-proportion to the known disease. There is not evidence of decompensation. Oxygenation: normal oxygenation    -Discussed evaluation, treatment and usual course. All questions were answered.  -There is no current indication for PAH-specific medications.  -There is no compelling indication at this time for RHC.  -Recommended we continue active clinical surveillance.  Signs and symptoms of worsening PAH and RV failure were discussed. Hand-out was provided in discharge paperwork.  -Continue treatment, as below   2. Chronic heart failure with preserved ejection fraction (CMS/HCC). NYHA stage C; Functional class NYHA Functional Class: III. NYHA change: Nyha  change: did not change.     Today on examination and history, CHF volume status: Patient appears euvolemic.; Perfusion status: good. There is not evidence of decompensation. The patient has not had recent ED visits or admissions specifically for ADHF.  Clinical trajectory is stable.    The Rucker Index was updated today. Most recent score: 6. Score change: 0.     -Toprol XL  -Diuretic Therapy:  ordered  -Torsemide 40mg  -Nutrition: no nutritional compromise at this time:  Patient to follow up PRN on outpatient basis   -EOL care:   The patient does have an advanced directive. The trajectory of HF has been discussed. Regular discussions of prognosis and goals have been performed. She did discuss with her  in [place]. We can address this further at her next appt. Plan on ACP at next meeting.,   3. PRABHU treated with autoBiPAP.   The patient has documented PRABHU with PSG. PRABHU is an important modifiable CV and PAH risk factor.   -Avoid ETOH, weight reduction  -Treatment of HTN, per PCP  -Follow-up sleep medicine       Return in about 4 weeks (around 9/27/2021).

## 2021-08-30 NOTE — PROGRESS NOTES
"[facility] HEART FAILURE      Patient Name: Denny Ivy  :8/3/1929  Age: 89 y.o.  Sex: female  Referring Provider: Mindi Vazquez APRN   Encounter Provider: Chante Leblanc, PharmD, BCACP      Chief Complaint:   Chief Complaint   Patient presents with   • Congestive Heart Failure       HPI:  Patient presents with her granddaughter for a follow-up visit in the [facility]. PMH is significant for HFpEF (EF 65%), T2DM, HTN, TIA, hypothyroidism, and CKD stage 3. Overall, patient's symptoms have been stable. She has followed up with nephrology and sleep medicine. Nephrology recently increased her torsemide to 40 mg daily. Patient has not had an appt with neurology yet. Her granddaughter states some confusion regarding the ASA. She is currently taking 81 mg daily and she was advised to continue this until she sees neurology and they can make changes if desired. BP checked daily and is systolic is consistently in the 140s. Her granddaughter states sometimes it is in the 170s if it is taken prior to patient taking her medications.      Current Regimen:  Heart Failure  Torsemide 20 mg (2 tablets) daily  Metoprolol succinate 50 mg nightly      Other CV Meds   mg daily  Atorvastatin 40 mg daily  Clonidine 0.1 mg bid  Diltiazem  mg daily  Hydralazine 100 mg tid      Medication Use:  Adherence: good  may miss 1-2 doses/week  Past hx of medication use/intolerance: spironolactone (hyperkalemia); pravastatin (switched to atorvastatin)  Affordability: Traditional Medicare      Social History:  Tobacco use: none  Exercise: limited by comorbid conditions  PT/OT coming out to home since discharge      Immunization Status:  Pneumococcal: defer discussion to future visit  Influenza: defer discussion to future visit   COVID-19: 21 and 21      OBJECTIVE:    /94 (BP Location: Left arm, Patient Position: Sitting)   Pulse 82   Ht 157.5 cm (62.01\")   Wt 79.9 kg (176 lb 3.2 oz)   SpO2 94%   BMI 32.22 " kg/m²     Body mass index is 32.22 kg/m².  Wt Readings from Last 1 Encounters:   08/30/21 79.9 kg (176 lb 3.2 oz)       Lab Review:  Renal Function: CrCl cannot be calculated (Patient's most recent lab result is older than the maximum 30 days allowed.).    Lab Results   Component Value Date    PROBNP 1,065.0 06/21/2021       Results for orders placed during the hospital encounter of 05/22/21    Adult Transthoracic Echo Complete W/ Cont if Necessary Per Protocol    Interpretation Summary  · Estimated right ventricular systolic pressure from tricuspid regurgitation is mildly elevated (35-45 mmHg). Calculated right ventricular systolic pressure from tricuspid regurgitation is 42 mmHg.  · Estimated left ventricular EF = 65% Left ventricular systolic function is normal.  · Left ventricular diastolic function was indeterminate.      ASSESSMENT/PLAN OF CARE:    1. HFpEF (EF 65%)  - Patient's HF symptoms are stable at this time  - Continue torsemide 20 mg (2 tablets) daily  - Continue metoprolol succinate 50 mg nightly  - Could consider trialing Jardiance at next visit given recent data from [facility]EROR-PRESERVED. Patient's granddaughter is going to try to get her advance directive prior to next appt   - Patient is due for PPSV23. Will discuss this with patient and her granddaughter at next visit   - Advised patient to call clinic with 2-3 lb weight gain in 24 hrs or >5 lb weight gain in 1 week         10 min spent in direct patient care        Thank you for allowing me to participate in the care of your patient,         Chante Lebalnc [facility] HEART FAILURE  08/30/21  12:29 EDT

## 2021-11-09 RX ORDER — ATORVASTATIN CALCIUM 40 MG/1
TABLET, FILM COATED ORAL
Qty: 31 TABLET | Refills: 0 | OUTPATIENT
Start: 2021-11-09

## 2021-11-09 RX ORDER — METOPROLOL SUCCINATE 50 MG/1
TABLET, EXTENDED RELEASE ORAL
Qty: 90 TABLET | Refills: 0 | Status: SHIPPED | OUTPATIENT
Start: 2021-11-09

## 2022-01-19 ENCOUNTER — OFFICE VISIT (OUTPATIENT)
Dept: CARDIOLOGY | Facility: CLINIC | Age: 87
End: 2022-01-19

## 2022-01-19 VITALS
OXYGEN SATURATION: 99 % | DIASTOLIC BLOOD PRESSURE: 84 MMHG | SYSTOLIC BLOOD PRESSURE: 136 MMHG | BODY MASS INDEX: 31.83 KG/M2 | HEART RATE: 79 BPM | RESPIRATION RATE: 16 BRPM | HEIGHT: 62 IN | WEIGHT: 173 LBS

## 2022-01-19 DIAGNOSIS — I27.20 PULMONARY HYPERTENSION: ICD-10-CM

## 2022-01-19 DIAGNOSIS — Z79.4 TYPE 2 DIABETES MELLITUS WITHOUT COMPLICATION, WITH LONG-TERM CURRENT USE OF INSULIN: ICD-10-CM

## 2022-01-19 DIAGNOSIS — G47.33 OSA TREATED WITH BIPAP: ICD-10-CM

## 2022-01-19 DIAGNOSIS — N18.32 STAGE 3B CHRONIC KIDNEY DISEASE: ICD-10-CM

## 2022-01-19 DIAGNOSIS — E11.9 TYPE 2 DIABETES MELLITUS WITHOUT COMPLICATION, WITH LONG-TERM CURRENT USE OF INSULIN: ICD-10-CM

## 2022-01-19 DIAGNOSIS — I50.32 CHRONIC HEART FAILURE WITH PRESERVED EJECTION FRACTION: Primary | ICD-10-CM

## 2022-01-19 DIAGNOSIS — I10 PRIMARY HYPERTENSION: ICD-10-CM

## 2022-01-19 PROCEDURE — 93000 ELECTROCARDIOGRAM COMPLETE: CPT | Performed by: INTERNAL MEDICINE

## 2022-01-19 PROCEDURE — 99213 OFFICE O/P EST LOW 20 MIN: CPT | Performed by: INTERNAL MEDICINE

## 2022-01-19 RX ORDER — ATORVASTATIN CALCIUM 40 MG/1
40 TABLET, FILM COATED ORAL DAILY
Qty: 90 TABLET | Refills: 3 | Status: SHIPPED | OUTPATIENT
Start: 2022-01-19

## 2022-01-19 RX ORDER — MULTIPLE VITAMINS W/ MINERALS TAB 9MG-400MCG
1 TAB ORAL DAILY
COMMUNITY

## 2022-01-19 RX ORDER — PROCHLORPERAZINE 25 MG/1
SUPPOSITORY RECTAL
COMMUNITY

## 2022-01-19 RX ORDER — METOLAZONE 2.5 MG/1
2.5 TABLET ORAL DAILY
COMMUNITY
End: 2022-04-01 | Stop reason: SDUPTHER

## 2022-01-19 NOTE — PROGRESS NOTES
CARDIOLOGY    Disha Marsh MD    ENCOUNTER DATE:  01/19/2022    Denny Ivy / 89 y.o. / female        CHIEF COMPLAINT / REASON FOR OFFICE VISIT     Heart Problem      HISTORY OF PRESENT ILLNESS       HPI    Denny Ivy is a 89 y.o. female     She has chronic diastolic heart failure, hypertension, hypothyroid, diabetes type 2 and COPD as well as chronic kidney disease.  She had seen Dr. Arias in the past but I started seeing her when she was hospitalized in May 2021.  Echocardiogram from May 2021 showed normal LV systolic function with an ejection fraction of 65%.  She had a right ventricular systolic pressure 42 mmHg.  She had normal renal arteries in June 2021.    She was also following with the heart failure clinic but has not been down there to see them since August 2021.    She has family with her today.  She requires 24-hour a day care.  Her blood pressure has been controlled.  She has not been complaining of much.  She is on and off lethargic.  She did have some weight gain with swelling in her legs and abdomen.  Her family gave her a couple doses of metolazone and her weight has come down and she is no longer edematous.    REVIEW OF SYSTEMS     Review of Systems   Constitutional: Negative for chills, fever, weight gain and weight loss.   Cardiovascular: Negative for leg swelling.   Respiratory: Negative for cough, snoring and wheezing.    Hematologic/Lymphatic: Negative for bleeding problem. Does not bruise/bleed easily.   Skin: Negative for color change.   Musculoskeletal: Negative for falls, joint pain and myalgias.   Gastrointestinal: Negative for melena.   Genitourinary: Negative for hematuria.   Neurological: Negative for excessive daytime sleepiness.   Psychiatric/Behavioral: Negative for depression. The patient is not nervous/anxious.          VITAL SIGNS     Visit Vitals  /84 (BP Location: Left arm, Patient Position: Sitting, Cuff Size: Adult)   Pulse 79   Resp 16  "  Ht 157.5 cm (62\")   Wt 78.5 kg (173 lb) Comment: Reported by daughter   SpO2 99%   BMI 31.64 kg/m²         Wt Readings from Last 3 Encounters:   01/19/22 78.5 kg (173 lb)   08/30/21 79.9 kg (176 lb 3.2 oz)   07/21/21 79.8 kg (176 lb)     Body mass index is 31.64 kg/m².      PHYSICAL EXAMINATION     Constitutional:       General: Not in acute distress.  Neck:      Vascular: No carotid bruit or JVD.   Pulmonary:      Effort: Pulmonary effort is normal.      Breath sounds: Normal breath sounds.   Cardiovascular:      Normal rate. Regular rhythm.      Murmurs: There is no murmur.   Psychiatric:         Mood and Affect: Mood and affect normal.           REVIEWED DATA       ECG 12 Lead    Date/Time: 1/19/2022 2:33 PM  Performed by: Disha Marsh MD  Authorized by: Disha Marsh MD   Comparison: compared with previous ECG from 7/8/2021  Similar to previous ECG  Rhythm: sinus rhythm  BPM: 79  Conduction: right bundle branch block  ST Segments: ST segments normal  T Waves: T waves normal    Clinical impression: normal ECG              Lipid Panel    Lipid Panel 6/4/21   Total Cholesterol 133   Triglycerides 69   HDL Cholesterol 55   VLDL Cholesterol 14   LDL Cholesterol  64   LDL/HDL Ratio 1.17             Lab Results   Component Value Date    GLUCOSE 241 (H) 06/21/2021    BUN 37 (H) 06/21/2021    CREATININE 1.32 (H) 06/21/2021    EGFRIFAFRI 46 (L) 06/21/2021    BCR 28.0 (H) 06/21/2021    K 4.5 06/21/2021    CO2 28.3 06/21/2021    CALCIUM 9.7 (H) 06/21/2021    ALBUMIN 3.20 (L) 06/08/2021    AST 12 06/01/2021    ALT 20 06/01/2021       ASSESSMENT & PLAN      Diagnosis Plan   1. Chronic heart failure with preserved ejection fraction (HCC)     2. Stage 3b chronic kidney disease (HCC)     3. PRABHU treated with autoBiPAP     4. Pulmonary hypertension (HCC)     5. Type 2 diabetes mellitus without complication, with long-term current use of insulin (HCC)     6. Primary hypertension         1.  Chronic diastolic heart " failure.  She looks euvolemic today.  I encouraged her family to reestablish care with the heart failure clinic as well.  Continue torsemide.  I cautioned to hold off on metolazone since she looks euvolemic and follow back up with her nephrologist in the heart failure clinic.  2.  Hypertension.  Normal renal ultrasound in June 2021.  Blood pressures controlled.  3.  Chronic kidney disease  4.  Diabetes.  On insulin.  5.  Obstructive sleep apnea.  6.  Chronic kidney disease  7.  Mild pulmonary hypertension    Follow-up with Shelli in 6 months.      Orders Placed This Encounter   Procedures   • ECG 12 Lead     This order was created via procedure documentation     Order Specific Question:   Release to patient     Answer:   Immediate           MEDICATIONS         Discharge Medications          Accurate as of January 19, 2022  2:34 PM. If you have any questions, ask your nurse or doctor.            Continue These Medications      Instructions Start Date   Accu-Chek Harriet Plus test strip  Generic drug: glucose blood   CBS three times daily      aspirin 81 MG EC tablet   81 mg, Oral, Daily      atorvastatin 40 MG tablet  Commonly known as: LIPITOR   40 mg, Oral, Daily      B-D UF III MINI PEN NEEDLES 31G X 5 MM misc  Generic drug: Insulin Pen Needle   400 each, Other      buPROPion 75 MG tablet  Commonly known as: WELLBUTRIN   75 mg, Oral, 2 Times Daily      cloNIDine 0.1 MG tablet  Commonly known as: CATAPRES   0.1 mg, Oral, Every 12 Hours Scheduled      Dexcom G6 Sensor   Does not apply, Every 10 Days      dilTIAZem  MG 24 hr capsule  Commonly known as: CARDIZEM CD   300 mg, Oral, Every 24 Hours Scheduled      hydrALAZINE 100 MG tablet  Commonly known as: APRESOLINE   100 mg, Oral, 3 Times Daily      insulin glargine 100 UNIT/ML injection  Commonly known as: LANTUS, SEMGLEE   17 Units, Subcutaneous, Nightly      insulin lispro 100 UNIT/ML injection  Commonly known as: humaLOG   Subcutaneous, 3 Times Daily Before  Meals, Sliding scale- 8 units at breakfast 7 units at lunch 4 units at supper       metOLazone 2.5 MG tablet  Commonly known as: ZAROXOLYN   2.5 mg, Oral, Daily, Takes only as needed       metoprolol succinate XL 50 MG 24 hr tablet  Commonly known as: TOPROL-XL   TAKE 1 TABLET BY MOUTH EVERY NIGHT      multivitamin with minerals tablet tablet   1 tablet, Oral, Daily      torsemide 20 MG tablet  Commonly known as: DEMADEX   40 mg, Oral, Daily      vitamin D3 125 MCG (5000 UT) capsule capsule   5,000 Units, Oral, Daily, Every other day                Disha Marsh MD  01/19/22  14:34 EST    **Donna Disclaimer:   Much of this encounter note is an electronic transcription/translation of spoken language to printed text. The electronic translation of spoken language may permit erroneous, or at times, nonsensical words or phrases to be inadvertently transcribed. Although I have reviewed the note for such errors, some may still exist.

## 2022-01-24 ENCOUNTER — OFFICE VISIT (OUTPATIENT)
Dept: NEUROLOGY | Facility: CLINIC | Age: 87
End: 2022-01-24

## 2022-01-24 VITALS
DIASTOLIC BLOOD PRESSURE: 78 MMHG | HEIGHT: 62 IN | BODY MASS INDEX: 31.64 KG/M2 | HEART RATE: 74 BPM | SYSTOLIC BLOOD PRESSURE: 142 MMHG | OXYGEN SATURATION: 97 %

## 2022-01-24 DIAGNOSIS — F01.50 VASCULAR DEMENTIA WITHOUT BEHAVIORAL DISTURBANCE: ICD-10-CM

## 2022-01-24 DIAGNOSIS — G45.9 TIA (TRANSIENT ISCHEMIC ATTACK): Primary | ICD-10-CM

## 2022-01-24 PROCEDURE — 99213 OFFICE O/P EST LOW 20 MIN: CPT | Performed by: NURSE PRACTITIONER

## 2022-01-24 NOTE — PROGRESS NOTES
DOS: 2022  NAME: Denny Ivy   : 8/3/1929  PCP: Мария Wilks MD    Chief Complaint   Patient presents with   • Transient Ischemic Attack      Referring MD: Alejandro Barton,*    Neurological Problem:  89 y.o. right-handed female with a Hx of diabetes, CKD, pulmonary hypertension, chronic heart failure who presents today for TIA follow-up.  She is accompanied by her daughter.  History is provided by the patient and daughter and review of records as summarized below.    Interval History:  Mrs. Ivy presented to [facility] on 2021 with complaints of generalized weakness, increased fatigue, and poor p.o. intake.  She received IV fluids overnight and reportedly started to feel better.  We were consulted on 6/3 due to the patient noted to be less responsive, not following commands, and questionable right arm weakness.  Her BP was noted to be 197/93.  Stat CT head obtained and revealed no acute intracranial abnormalities, small dense calcific lesion of the left frontal lobe likely small meningioma which is unchanged from her prior CT head in 2018.  MRI brain and CTA head and neck were ordered that revealed no evidence of restricted diffusion to suggest an acute infarction, extensive small vessel disease, no evidence of significant stenosis, dissection, or LVO noted.  EEG was also ordered due to her fluctuating mental status.  This revealed no epileptiform or seizure activity.  We suspected she suffered a left hemispheric TIA in the setting of hypertensive urgency.  She was treated with a Plavix load and started on Plavix 75 mg daily along with her ASA regimen continue oral statin therapy.  Given the evidence of her extensive small vessel disease and exam we also suspected that the patient had underlying vascular dementia.    Today she presents and reports she has had no new stroke/TIA symptoms since her hospitalization in May 2021.  Her daughter who is with her reports she  initially was on ASA and Plavix however she was then discontinued off of her ASA and then was recently restarted over the last few weeks.  She states the same with the Lipitor that the patient was taken off of it however she has since been reinitiated over the last week.  Daughter reports there will be times that she will have significant episodes of being confused and disoriented.  Unfortunately her  did recently pass away.  They state her BP has been better controlled.  They try to get her to do exercises in her wheelchair.  Daughter states that her  tries to get the patient to play checkers however they do noticed that she was getting somewhat frustrated with it.  She does require 24-hour care.  They fix her meals and help her with getting dressed.  She also has caregivers visit the home.    Review of Systems:        Review of Systems   Constitutional: Positive for fatigue. Negative for chills and fever.   HENT: Positive for hearing loss and tinnitus. Negative for trouble swallowing.    Eyes: Positive for photophobia and visual disturbance. Negative for redness.   Respiratory: Negative for cough, shortness of breath and wheezing.    Cardiovascular: Positive for leg swelling. Negative for chest pain and palpitations.   Gastrointestinal: Negative for diarrhea, nausea and vomiting.   Endocrine: Negative for cold intolerance, heat intolerance and polydipsia.   Genitourinary: Negative for decreased urine volume, difficulty urinating and urgency.   Musculoskeletal: Positive for gait problem (walker). Negative for back pain, neck pain and neck stiffness.   Skin: Negative for color change, rash and wound.   Allergic/Immunologic: Negative for environmental allergies, food allergies and immunocompromised state.   Neurological: Positive for speech difficulty and weakness. Negative for dizziness, tremors, seizures, syncope, facial asymmetry, light-headedness, numbness and headaches.   Hematological: Negative  "for adenopathy. Does not bruise/bleed easily.   Psychiatric/Behavioral: Positive for agitation, behavioral problems, confusion, decreased concentration and sleep disturbance. The patient is nervous/anxious.        \"The following portions of the patient's history were reviewed and updated as appropriate: allergies, current medications, past family history, past medical history, past social history, past surgical history and problem list.\"    Review and Interpretation of Imaging as described in interval history.    Laboratory Results:             Lab Results   Component Value Date    HGBA1C 6.6 (H) 11/15/2021     Lab Results   Component Value Date    CHOL 133 06/04/2021     Lab Results   Component Value Date    HDL 55 06/04/2021     Lab Results   Component Value Date    LDL 64 06/04/2021     Lab Results   Component Value Date    TRIG 69 06/04/2021     No components found for: CHOLHDL  No results found for: RPR  Lab Results   Component Value Date    TSH 2.200 06/01/2021     Lab Results   Component Value Date    NGSXVLJI41 1,031 (H) 06/01/2021     Lab Results   Component Value Date    GLUCOSE 241 (H) 06/21/2021    BUN 37 (H) 06/21/2021    CREATININE 1.32 (H) 06/21/2021    EGFRIFAFRI 46 (L) 06/21/2021    BCR 28.0 (H) 06/21/2021    K 4.5 06/21/2021    CO2 28.3 06/21/2021    CALCIUM 9.7 (H) 06/21/2021    ALBUMIN 3.20 (L) 06/08/2021    AST 12 06/01/2021    ALT 20 06/01/2021     Lab Results   Component Value Date    WBC 8.77 11/15/2021    HGB 10.1 (L) 11/15/2021    HCT 31.4 (L) 11/15/2021    MCV 94.0 11/15/2021     11/15/2021     Lab Results   Component Value Date    INR 1.01 05/22/2021    INR 1.07 07/06/2018    PROTIME 13.1 05/22/2021    PROTIME 13.7 07/06/2018       Physical Examination:   mRS:   General Appearance:   Obese elderly female, pleasantly confused, well developed, well nourished, well groomed, alert, and cooperative.  HEENT: Normocephalic. Atraumatic.  Extremities:    No obvious " "edema.  Respiratory:   Even and unlabored.    Neurological examination:  Higher Integrative  Function: Oriented to person. Stated year was \"1980\", She knew who the previous president was but not the current one.  Able to write a sentence in cursive.  Very hard of hearing so further examination of the [place] function with limited.  Normal naming and reading.  Normal language including comprehension.  CN II: Abnormal visual acuity, normal visual fields.  CN III IV VI: Extraocular movements are full without nystagmus.   CN V: Normal facial sensation and strength of muscles of mastication.  CN VII: Facial movements are symmetric. No weakness.  CN VIII:   Auditory acuity is normal with use of tuning fork, but she is very [place] during conversation  CN IX & X:   Symmetric palatal movement.  CN XI: Sternocleidomastoid and trapezius are normal.  No weakness.  CN XII:   The tongue is midline.  No atrophy or fasciculations.  Motor: Normal muscle strength, in upper extremities. 4/5 BLE. No fasciculations, rigidity, spasticity, or abnormal movements.  Sensation: Absent vibratory sensation of lower extremities.   Station and Gait: Wheelchair-bound.    Impression/Assessment:    Ms. Ivy presents today for TIA follow-up.  She has been maintained on ASA 81 mg and Lipitor 40 mg.  Her daughter reports there had been some previous discussion about stopping the patient's aspirin and Lipitor however since that she has been reinitiated on it.  I recommended continuing both for secondary stroke prevention if the patient has not been exhibiting any signs of bleeding or trouble with myalgias or liver dysfunction.  Fortunately she has had no new stroke/TIA symptoms since being discharged from the hospital in May 2021. Today her BP was stable at 142/78.  They will continue to monitor this.  As far as her dementia she continues to have decline with her memory and some executive dysfunction however has had no worsening behavioral disturbances. " We discussed the importance of frequent reorientation, keeping the patient up during the day and establishing a daily routine which will decrease confusion, continuing daily hobbies and cognitive exercises including puzzles, book reading, board games, etc., minimizing narcotic use, and participating in daily physical activity as tolerated. We also discussed locking up all firearms in the house, monitoring stove/oven use if the patient still continues to cook, no driving ever and storing away car keys if necessary, monitoring medication use and making sure the patient is taking the correct medications and dosages daily. We also discussed at length her personal risk factors for stroke and importance of risk factor control for stroke prevention, including BP and cholesterol control.  She will monitor BP regularly and follow-up with PCP for continued cholesterol surveillance.  We discussed stroke/TIA symptoms and importance of calling 911 if she were to experience any of these.  She can follow-up with me as needed.      Plan:     Continue ASA, monitor for signs symptoms of bleeding  Continue Lipitor, repeat lipid panel with neck scheduled lab draw with PCP  Monitor BP regularly  Keep well-hydrated  Encourage regular physical activity as tolerated  Falls precautions discussed  Maintain well-balanced diet  Continue daily cognitive exercises as discussed above.   Blood pressure control to <130/80   Goal LDL <70-recommend high dose statins-    Serum glucose < 140   Call 911 for stroke any stroke symptoms   Follow-up as needed.  Diagnoses and all orders for this visit:    1. TIA (transient ischemic attack) (Primary)    2. Vascular dementia without behavioral disturbance (HCC)      MDM  Reviewed: previous chart and vitals  Reviewed previous: MRI and labs  Interpretation: labs and MRI      DENNY Galeas

## 2022-02-16 RX ORDER — METOPROLOL SUCCINATE 50 MG/1
TABLET, EXTENDED RELEASE ORAL
Qty: 90 TABLET | Refills: 0 | OUTPATIENT
Start: 2022-02-16

## 2022-03-31 ENCOUNTER — TELEPHONE (OUTPATIENT)
Dept: CARDIOLOGY | Facility: CLINIC | Age: 87
End: 2022-03-31

## 2022-03-31 ENCOUNTER — APPOINTMENT (OUTPATIENT)
Dept: CARDIOLOGY | Facility: HOSPITAL | Age: 87
End: 2022-03-31

## 2022-03-31 NOTE — TELEPHONE ENCOUNTER
Received labs from  of L3/22/2022.  Creatinine is 1.64, GFR 31, potassium 5.1, sodium 143, normal TSH, normal vitamin B12 and hemoglobin A1c 6.5.  We will scan.    Thank you all-I will see her tomorrow in [facility] after they arrive

## 2022-03-31 NOTE — TELEPHONE ENCOUNTER
Called Pt's daughter and let her know that DENNY Talavera can see her at 1:00 today in [facility].  Per daughter, she can't get her here until 2:00 PM due to transportation.  I let her know I will call back or have the office reach out to her to see what can be arranged. All questions and concerns addressed. Understanding and agreement verbalized.      Eugenia Etienne RN  [facility]

## 2022-03-31 NOTE — TELEPHONE ENCOUNTER
Patient scheduled for [facility] tomorrow at 11:00.    Deedee Maya RN  Triage [facility]

## 2022-03-31 NOTE — TELEPHONE ENCOUNTER
Pt's daughter called this AM because Pt is c/o increased shortness of air, increased  edema in lower extremities up to her thighs.  Her weight is up about 15 lbs per daughter, however this gain has been over the past several months.   Her BP has been 140s/80s, she denies any chest pain.  Her daughter said Pt saw her diabetes doctor yesterday and was advised to call us to schedule appt. as sooner as possible so she does not end up in the hospital.  I let her know that we do not have a provider in clinic until Monday, but I will reach out to provider and call her back.  I asked if Pt has been taking aby diuretic and what dose as we have not seen her here in clinic since 8/30/21. Pt's daughter is not sure of her diuretic and thinks she may have run out.  Pt saw Dr. Marsh 1/12/22 and was on Torsemide 40 mg daily and metolazone 2.5 mg as needed.  Pt does have a  24 hr caregiver who stays with her.  We can get Pt here in clinic on Monday.  However, since we have no provider here until Monday, I wanted to check and see if we need to have her make any adjustments in her medications or advise her to go to the ED.  Please advise    Eugenia Etienne RN  [facility]

## 2022-03-31 NOTE — TELEPHONE ENCOUNTER
I called and relayed Shelli's recommendations on patient's daughter's voicemail. I will inform the front office of her appt./ OLLIE

## 2022-03-31 NOTE — TELEPHONE ENCOUNTER
I spoke with patient's granddaughter (Smitha Ivy [phone]) who states she can't get transportation for patient today until after 3:00pm.  I discussed this with DENNY Talavera who stated that would be too late to obtain labs and evaluate patient.  Shelli offered tomorrow morning before 12:00.  Leilani states she will have patient here at 11:00 am tomorrow morning and that she is very appreciative.   Copying Deedee on this telephone thread so the [facility] is aware. / OLLIE

## 2022-03-31 NOTE — TELEPHONE ENCOUNTER
Patient's granddaughter could not get transportation for patient today.  She will have patient her at 11:00am tomorrow.  Patient will have labs done in [facility] and Shelli with see her in the [facility] area. / OLLIE

## 2022-03-31 NOTE — TELEPHONE ENCOUNTER
Aarti- please let her daughter know my open slot is at 1 but I understand transportation issue. When they get here I want them placed in [facility] for stat BMP and stat pro BNP and I will come over after labs are sent to assess her. Let the daughter know they will be here a couple hours at minimum. Please have her put in my 1 p slot and notify  and [facility]

## 2022-04-01 ENCOUNTER — HOSPITAL ENCOUNTER (OUTPATIENT)
Dept: GENERAL RADIOLOGY | Facility: HOSPITAL | Age: 87
Discharge: HOME | End: 2022-04-01

## 2022-04-01 ENCOUNTER — HOSPITAL ENCOUNTER (OUTPATIENT)
Dept: CARDIOLOGY | Facility: HOSPITAL | Age: 87
Discharge: HOME | End: 2022-04-01

## 2022-04-01 VITALS
HEART RATE: 75 BPM | OXYGEN SATURATION: 94 % | DIASTOLIC BLOOD PRESSURE: 71 MMHG | RESPIRATION RATE: 24 BRPM | SYSTOLIC BLOOD PRESSURE: 174 MMHG

## 2022-04-01 DIAGNOSIS — N18.32 STAGE 3B CHRONIC KIDNEY DISEASE: ICD-10-CM

## 2022-04-01 DIAGNOSIS — I50.31 ACUTE DIASTOLIC CHF (CONGESTIVE HEART FAILURE): ICD-10-CM

## 2022-04-01 DIAGNOSIS — R05.9 COUGH: ICD-10-CM

## 2022-04-01 DIAGNOSIS — I50.32 CHRONIC HEART FAILURE WITH PRESERVED EJECTION FRACTION: Primary | ICD-10-CM

## 2022-04-01 LAB
ANION GAP SERPL CALCULATED.3IONS-SCNC: 11.1 MMOL/L (ref 5–15)
BUN SERPL-MCNC: 40 MG/DL (ref 8–23)
BUN/CREAT SERPL: 23.8 (ref 7–25)
CALCIUM SPEC-SCNC: 9.5 MG/DL (ref 8.2–9.6)
CHLORIDE SERPL-SCNC: 101 MMOL/L (ref 98–107)
CO2 SERPL-SCNC: 29.9 MMOL/L (ref 22–29)
CREAT SERPL-MCNC: 1.68 MG/DL (ref 0.57–1)
EGFRCR SERPLBLD CKD-EPI 2021: 28.4 ML/MIN/1.73
GLUCOSE SERPL-MCNC: 134 MG/DL (ref 65–99)
NT-PROBNP SERPL-MCNC: 1933 PG/ML (ref 0–1800)
POTASSIUM SERPL-SCNC: 4.8 MMOL/L (ref 3.5–5.2)
SODIUM SERPL-SCNC: 142 MMOL/L (ref 136–145)

## 2022-04-01 PROCEDURE — 96374 THER/PROPH/DIAG INJ IV PUSH: CPT

## 2022-04-01 PROCEDURE — 36415 COLL VENOUS BLD VENIPUNCTURE: CPT

## 2022-04-01 PROCEDURE — 80048 BASIC METABOLIC PNL TOTAL CA: CPT

## 2022-04-01 PROCEDURE — 83880 ASSAY OF NATRIURETIC PEPTIDE: CPT

## 2022-04-01 PROCEDURE — 25010000002 FUROSEMIDE PER 20 MG: Performed by: NURSE PRACTITIONER

## 2022-04-01 PROCEDURE — 94760 N-INVAS EAR/PLS OXIMETRY 1: CPT

## 2022-04-01 PROCEDURE — 71046 X-RAY EXAM CHEST 2 VIEWS: CPT

## 2022-04-01 RX ORDER — FUROSEMIDE 10 MG/ML
40 INJECTION INTRAMUSCULAR; INTRAVENOUS ONCE
Status: COMPLETED | OUTPATIENT
Start: 2022-04-01 | End: 2022-04-01

## 2022-04-01 RX ADMIN — FUROSEMIDE 40 MG: 10 INJECTION, SOLUTION INTRAMUSCULAR; INTRAVENOUS at 14:10

## 2022-04-15 RX ORDER — DILTIAZEM HYDROCHLORIDE 300 MG/1
300 CAPSULE, COATED, EXTENDED RELEASE ORAL
Qty: 90 CAPSULE | Refills: 0 | Status: SHIPPED | OUTPATIENT
Start: 2022-04-15

## 2022-04-15 RX ORDER — METOLAZONE 2.5 MG/1
2.5 TABLET ORAL DAILY
Qty: 14 TABLET | Refills: 0 | OUTPATIENT
Start: 2022-04-15